# Patient Record
Sex: MALE | Race: WHITE | NOT HISPANIC OR LATINO | Employment: OTHER | URBAN - METROPOLITAN AREA
[De-identification: names, ages, dates, MRNs, and addresses within clinical notes are randomized per-mention and may not be internally consistent; named-entity substitution may affect disease eponyms.]

---

## 2017-01-08 DIAGNOSIS — J30.9 ATOPIC RHINITIS: ICD-10-CM

## 2017-01-09 RX ORDER — METFORMIN HYDROCHLORIDE 500 MG/1
TABLET, EXTENDED RELEASE ORAL
Qty: 120 TABLET | Refills: 10 | OUTPATIENT
Start: 2017-01-09

## 2017-01-09 RX ORDER — FLUTICASONE PROPIONATE 50 MCG
SPRAY, SUSPENSION (ML) NASAL
Refills: 0 | OUTPATIENT
Start: 2017-01-09

## 2017-01-11 DIAGNOSIS — J30.9 ATOPIC RHINITIS: ICD-10-CM

## 2017-01-11 RX ORDER — FLUTICASONE PROPIONATE 50 MCG
SPRAY, SUSPENSION (ML) NASAL
Refills: 0 | OUTPATIENT
Start: 2017-01-11

## 2017-01-20 ENCOUNTER — DOCUMENTATION (OUTPATIENT)
Dept: INTERNAL MEDICINE | Age: 72
End: 2017-01-20

## 2017-01-20 DIAGNOSIS — E11.9 CONTROLLED TYPE 2 DIABETES MELLITUS WITHOUT COMPLICATION, UNSPECIFIED LONG TERM INSULIN USE STATUS: ICD-10-CM

## 2017-01-20 DIAGNOSIS — I10 ESSENTIAL HYPERTENSION: ICD-10-CM

## 2017-01-20 RX ORDER — GLIPIZIDE 10 MG/1
10 TABLET, FILM COATED, EXTENDED RELEASE ORAL DAILY
Qty: 30 TABLET | Refills: 5 | Status: SHIPPED | OUTPATIENT
Start: 2017-01-20 | End: 2017-02-13 | Stop reason: SDUPTHER

## 2017-01-20 RX ORDER — METFORMIN HYDROCHLORIDE 500 MG/1
500 TABLET, EXTENDED RELEASE ORAL
Qty: 120 TABLET | Refills: 5 | Status: SHIPPED | OUTPATIENT
Start: 2017-01-20 | End: 2017-08-08 | Stop reason: SDUPTHER

## 2017-01-20 RX ORDER — LISINOPRIL 20 MG/1
20 TABLET ORAL DAILY
Qty: 30 TABLET | Refills: 5 | Status: SHIPPED | OUTPATIENT
Start: 2017-01-20 | End: 2017-02-13 | Stop reason: SDUPTHER

## 2017-01-22 DIAGNOSIS — E78.5 HYPERLIPIDEMIA, UNSPECIFIED HYPERLIPIDEMIA TYPE: ICD-10-CM

## 2017-01-23 RX ORDER — FENOFIBRATE 145 MG/1
TABLET, COATED ORAL
Qty: 15 TABLET | Refills: 0 | OUTPATIENT
Start: 2017-01-23

## 2017-01-25 ENCOUNTER — TELEPHONE (OUTPATIENT)
Dept: INTERNAL MEDICINE | Age: 72
End: 2017-01-25

## 2017-01-26 NOTE — TELEPHONE ENCOUNTER
I can't do that at this time but see what other times may work for them. However if they can wait see if there is early morning time spots available.

## 2017-02-13 ENCOUNTER — OFFICE VISIT (OUTPATIENT)
Dept: INTERNAL MEDICINE | Age: 72
End: 2017-02-13

## 2017-02-13 VITALS
TEMPERATURE: 97.4 F | OXYGEN SATURATION: 98 % | HEART RATE: 63 BPM | DIASTOLIC BLOOD PRESSURE: 80 MMHG | BODY MASS INDEX: 28.14 KG/M2 | WEIGHT: 190 LBS | SYSTOLIC BLOOD PRESSURE: 140 MMHG | HEIGHT: 69 IN

## 2017-02-13 DIAGNOSIS — E78.5 HYPERLIPIDEMIA, UNSPECIFIED HYPERLIPIDEMIA TYPE: ICD-10-CM

## 2017-02-13 DIAGNOSIS — R00.2 HEART PALPITATIONS: ICD-10-CM

## 2017-02-13 DIAGNOSIS — R00.2 INTERMITTENT PALPITATIONS: ICD-10-CM

## 2017-02-13 DIAGNOSIS — E11.9 TYPE 2 DIABETES MELLITUS WITHOUT COMPLICATION, UNSPECIFIED LONG TERM INSULIN USE STATUS: Chronic | ICD-10-CM

## 2017-02-13 DIAGNOSIS — I10 ESSENTIAL HYPERTENSION: ICD-10-CM

## 2017-02-13 DIAGNOSIS — E11.9 CONTROLLED TYPE 2 DIABETES MELLITUS WITHOUT COMPLICATION, UNSPECIFIED LONG TERM INSULIN USE STATUS: Primary | ICD-10-CM

## 2017-02-13 PROCEDURE — 99214 OFFICE O/P EST MOD 30 MIN: CPT | Performed by: INTERNAL MEDICINE

## 2017-02-13 PROCEDURE — 93000 ELECTROCARDIOGRAM COMPLETE: CPT | Performed by: INTERNAL MEDICINE

## 2017-02-13 RX ORDER — ASPIRIN 81 MG/1
TABLET ORAL
COMMUNITY
Start: 2014-08-05 | End: 2018-11-06

## 2017-02-13 RX ORDER — GLIPIZIDE 10 MG/1
10 TABLET, FILM COATED, EXTENDED RELEASE ORAL DAILY
Qty: 30 TABLET | Refills: 5 | Status: SHIPPED | OUTPATIENT
Start: 2017-02-13 | End: 2018-01-04 | Stop reason: SDUPTHER

## 2017-02-13 RX ORDER — FENOFIBRATE 145 MG/1
145 TABLET, COATED ORAL DAILY
Qty: 30 TABLET | Refills: 11 | Status: SHIPPED | OUTPATIENT
Start: 2017-02-13 | End: 2018-03-04 | Stop reason: SDUPTHER

## 2017-02-13 RX ORDER — PIOGLITAZONEHYDROCHLORIDE 15 MG/1
TABLET ORAL
COMMUNITY
Start: 2015-12-15 | End: 2017-02-13

## 2017-02-13 RX ORDER — LISINOPRIL 30 MG/1
30 TABLET ORAL DAILY
Qty: 30 TABLET | Refills: 11 | Status: SHIPPED | OUTPATIENT
Start: 2017-02-13 | End: 2017-03-04 | Stop reason: SDUPTHER

## 2017-02-13 RX ORDER — LISINOPRIL 20 MG/1
20 TABLET ORAL DAILY
Qty: 30 TABLET | Refills: 11 | Status: SHIPPED | OUTPATIENT
Start: 2017-02-13 | End: 2017-02-13 | Stop reason: SDUPTHER

## 2017-02-13 NOTE — ASSESSMENT & PLAN NOTE
Was not taking pioglitazone.  Continue glipizide ER 10 mg and metformin 500 mg 2 bid.  Referral ophthalmologist

## 2017-02-13 NOTE — PROGRESS NOTES
"Bang Georgian / 71 y.o. / male  02/13/2017    VITALS    Visit Vitals   • /80   • Pulse 63   • Temp 97.4 °F (36.3 °C)   • Ht 69\" (175.3 cm)   • Wt 190 lb (86.2 kg)   • SpO2 98%   • BMI 28.06 kg/m2     BP Readings from Last 3 Encounters:   02/13/17 140/80   12/11/15 132/70   08/12/15 132/82     Wt Readings from Last 3 Encounters:   02/13/17 190 lb (86.2 kg)   12/11/15 192 lb 15.9 oz (87.5 kg)   08/12/15 188 lb 0.1 oz (85.3 kg)      Body mass index is 28.06 kg/(m^2).    CC:  Main reason(s) for today's visit: Diabetes      HPI:     Chronic type 2 diabetes :   Home blood sugar levels: consistently marginally high. Compliant with medication(s).  Denies significant problems / side effects.    C/o intermittent palpitations (not exertional, not associated w/ cp, wen, lightheadedness).  Most recent A1c level(s):    Lab Results   Component Value Date    HGBA1C 7.4 (H) 12/11/2015     Chronic essential hypertension :  Home BP readings: stable with SBP <140. Compliant with medication(s).  Denies significant problems or side effects. Most recent in-office blood pressure readings:   BP Readings from Last 3 Encounters:   02/13/17 140/80   12/11/15 132/70   08/12/15 132/82     Chronic hyperlipidemia :  Compliant with therapy.  Denies significant problems with medication(s).  Most recent labs:   Lab Results   Component Value Date    LDL 97 12/11/2015    HDL 45 12/11/2015    TRIG 80 12/11/2015    CHOLHDLRATIO 3.5 12/11/2015         ____________________________________________________________________    ASSESSMENT & PLAN:    Problem List Items Addressed This Visit        High    Type 2 diabetes mellitus (Chronic)    Current Assessment & Plan     Was not taking pioglitazone.  Continue glipizide ER 10 mg and metformin 500 mg 2 bid.  Referral ophthalmologist          Relevant Medications    [employer] LANCETS THIN 26G misc    metFORMIN ER (GLUCOPHAGE-XR) 500 MG 24 hr tablet    glipiZIDE (GLIPIZIDE XL) 10 MG 24 hr tablet       Medium    " Hyperlipidemia (Chronic)    Current Assessment & Plan     Continue fenofibrate. Check labs.         Relevant Medications    fenofibrate (TRICOR) 145 MG tablet    Other Relevant Orders    Lipid Panel With / Chol / HDL Ratio    Hypertension (Chronic)    Current Assessment & Plan     Increase lisinopril to 30 mg qd.          Relevant Medications    lisinopril (PRINIVIL,ZESTRIL) 30 MG tablet      Other Visit Diagnoses     Controlled type 2 diabetes mellitus without complication, unspecified long term insulin use status    -  Primary    Relevant Medications    glipiZIDE (GLIPIZIDE XL) 10 MG 24 hr tablet    glucose blood (WAVESENSE PRESTO) test strip    lisinopril (PRINIVIL,ZESTRIL) 30 MG tablet    Other Relevant Orders    Ambulatory Referral to Ophthalmology    Hemoglobin A1c    Comprehensive Metabolic Panel    Microalbumin / Creatinine Urine Ratio    Intermittent palpitations        EKG sinus sajan (56 bpm) o/w normal.     Heart palpitations            Orders Placed This Encounter   Procedures   • Hemoglobin A1c   • Lipid Panel With / Chol / HDL Ratio   • Comprehensive Metabolic Panel   • Microalbumin / Creatinine Urine Ratio   • Ambulatory Referral to Ophthalmology       Summary/Discussion:     · Start ECASA 81 mg qd.       Return in about 6 months (around 8/13/2017) for F/U chronic medical problems, Diabetes.    Future Appointments  Date Time Provider Department Center   8/17/2017 7:40 AM Siva Godfrey MD [facility] None       ____________________________________________________________________    REVIEW OF SYSTEMS    Review of Systems  As noted per HPI  Constitutional neg   Resp neg   CV neg    Other: As noted per HPI      PHYSICAL EXAMINATION    Physical Exam    Meño had a diabetic foot exam performed today.   During the foot exam he had a monofilament test performed (neg).    Vascular Status -  His exam exhibits right foot vasculature abnormal. His exam exhibits left foot vasculature abnormal.   Skin Integrity  -   His right foot skin is not intact.   Meño's left foot skin is not intact. .    Constitutional  No distress   Cardiovascular Rate  normal . Rhythm: regular . Heart sounds:  normal    Pulmonary/Chest  Effort normal. Breath sounds:  normal   Psychiatric  Alert. Judgment and thought content normal. Mood normal      REVIEWED DATA:    Labs:   Lab Results   Component Value Date     12/11/2015    K 4.4 12/11/2015    AST 19 12/11/2015    ALT 12 12/11/2015    BUN 20 12/11/2015    CREATININE 0.96 12/11/2015    EGFRIFNONA 81 12/11/2015    EGFRIFAFRI 94 12/11/2015       Lab Results   Component Value Date     (H) 12/11/2015    HGBA1C 7.4 (H) 12/11/2015       Lab Results   Component Value Date    LDL 97 12/11/2015    HDL 45 12/11/2015    TRIG 80 12/11/2015    CHOLHDLRATIO 3.5 12/11/2015       No results found for: TSH, FREET4     No results found for: WBC, HGB, PLT     Imaging:        Medical Tests:   EKG: there are no previous tracings available for comparison, normal sinus rhythm, sinus bradycardia 56 bpm.      Summary of old records / correspondence / consultant report:        Request outside records:          ALLERGIES:    Review of patient's allergies indicates no known allergies.    MEDICATIONS:  Current Outpatient Prescriptions   Medication Sig Dispense Refill   • fenofibrate (TRICOR) 145 MG tablet Take 1 tablet by mouth Daily. 30 tablet 11   • fluticasone (FLONASE) 50 MCG/ACT nasal spray 1 spray into each nostril 2 (Two) Times a Day. *LAST REFILL UNTIL PATIENT MAKES APPOINTMENT* 1 each 0   • glipiZIDE (GLIPIZIDE XL) 10 MG 24 hr tablet Take 1 tablet by mouth Daily. 30 tablet 5   • glucose blood (WAVESENSE PRESTO) test strip Check BS once daily 100 each 0   • glucose blood test strip [employer] Blood Glucose Test In Vitro Strip; Patient Sig: [employer] Blood Glucose Test In Vitro Strip TEST BLOOD SUGAR ONCE DAILY; 1; 3; 25-Sep-2015; Active     • [employer] LANCETS misc Monitor bs daily as directed 100 each 0   • [employer]  LANCETS THIN 26G misc USE ONE LANCET TO TEST DAILY AS DIRECTED 100 each 0   • lisinopril (PRINIVIL,ZESTRIL) 20 MG tablet Take 1 tablet by mouth Daily. 30 tablet 11   • metFORMIN ER (GLUCOPHAGE-XR) 500 MG 24 hr tablet Take 1 tablet by mouth Daily With Breakfast. 2 tabs twice daily 120 tablet 5     No current facility-administered medications for this visit.        [facility]    The following portions of the patient's history were reviewed and updated as appropriate: Allergies / Current Medications / Past Medical History / Surgical History / Social History / Family History    PROBLEM LIST:    Patient Active Problem List   Diagnosis   • Type 2 diabetes mellitus   • Atopic rhinitis   • Hyperlipidemia   • Hypertension       PAST MEDICAL HX:    History reviewed. No pertinent past medical history.    PAST SURGICAL HX:    History reviewed. No pertinent past surgical history.    SOCIAL HX:    Social History     Social History   • Marital status:      Spouse name: N/A   • Number of children: N/A   • Years of education: N/A     Social History Main Topics   • Smoking status: Unknown If Ever Smoked   • Smokeless tobacco: None   • Alcohol use None   • Drug use: None   • Sexual activity: Not Asked     Other Topics Concern   • None     Social History Narrative   • None       FAMILY HX:    History reviewed. No pertinent family history.

## 2017-02-14 ENCOUNTER — TELEPHONE (OUTPATIENT)
Dept: INTERNAL MEDICINE | Age: 72
End: 2017-02-14

## 2017-02-14 DIAGNOSIS — E11.9 TYPE 2 DIABETES MELLITUS WITHOUT COMPLICATION, UNSPECIFIED LONG TERM INSULIN USE STATUS: Primary | Chronic | ICD-10-CM

## 2017-02-14 LAB
ALBUMIN SERPL-MCNC: 4.8 G/DL (ref 3.5–5.2)
ALBUMIN/CREAT UR: 103.2 MG/G CREAT (ref 0–30)
ALBUMIN/GLOB SERPL: 1.8 G/DL
ALP SERPL-CCNC: 52 U/L (ref 39–117)
ALT SERPL-CCNC: 14 U/L (ref 1–41)
AST SERPL-CCNC: 16 U/L (ref 1–40)
BILIRUB SERPL-MCNC: 0.5 MG/DL (ref 0.1–1.2)
BUN SERPL-MCNC: 17 MG/DL (ref 8–23)
BUN/CREAT SERPL: 19.5 (ref 7–25)
CALCIUM SERPL-MCNC: 9.7 MG/DL (ref 8.6–10.5)
CHLORIDE SERPL-SCNC: 100 MMOL/L (ref 98–107)
CHOLEST SERPL-MCNC: 154 MG/DL (ref 0–200)
CHOLEST/HDLC SERPL: 3.14 {RATIO}
CO2 SERPL-SCNC: 25.9 MMOL/L (ref 22–29)
CREAT SERPL-MCNC: 0.87 MG/DL (ref 0.76–1.27)
CREAT UR-MCNC: 44 MG/DL
GLOBULIN SER CALC-MCNC: 2.7 GM/DL
GLUCOSE SERPL-MCNC: 153 MG/DL (ref 65–99)
HBA1C MFR BLD: 7.2 % (ref 4.8–5.6)
HDLC SERPL-MCNC: 49 MG/DL (ref 40–60)
LDLC SERPL CALC-MCNC: 94 MG/DL (ref 0–100)
MICROALBUMIN UR-MCNC: 45.4 UG/ML
POTASSIUM SERPL-SCNC: 4.3 MMOL/L (ref 3.5–5.2)
PROT SERPL-MCNC: 7.5 G/DL (ref 6–8.5)
SODIUM SERPL-SCNC: 139 MMOL/L (ref 136–145)
TRIGL SERPL-MCNC: 57 MG/DL (ref 0–150)
VLDLC SERPL CALC-MCNC: 11.4 MG/DL (ref 5–40)

## 2017-02-14 RX ORDER — PIOGLITAZONEHYDROCHLORIDE 15 MG/1
15 TABLET ORAL EVERY MORNING
Qty: 30 TABLET | Refills: 5 | Status: SHIPPED | OUTPATIENT
Start: 2017-02-14 | End: 2017-08-10 | Stop reason: SDUPTHER

## 2017-02-14 NOTE — TELEPHONE ENCOUNTER
----- Message from Kenia Levy MA sent at 2/14/2017 11:17 AM EST -----      ----- Message -----     From: Siva Godfrey MD     Sent: 2/14/2017  11:14 AM       To: Kenia Levy MA    Call patient (may need to talk w/ his son) with his test result(s) and mail the results to him if MyChart is NOT active.    Diabetes is overall okay although not optimal.   Add pioglitazone 15 mg QAM.   Monitor BS carefully.     Cholesterol is stable.

## 2017-02-14 NOTE — TELEPHONE ENCOUNTER
Called pt's son for pt results he is informed to  Start new medication and that we will send these to his mailing address. MOISÉS

## 2017-02-14 NOTE — PROGRESS NOTES
Call patient (may need to talk w/ his son) with his test result(s) and mail the results to him if MyChart is NOT active.    Diabetes is overall okay although not optimal.   Add pioglitazone 15 mg QAM.   Monitor BS carefully.     Cholesterol is stable.

## 2017-03-04 ENCOUNTER — DOCUMENTATION (OUTPATIENT)
Dept: INTERNAL MEDICINE | Age: 72
End: 2017-03-04

## 2017-03-04 DIAGNOSIS — I10 ESSENTIAL HYPERTENSION: ICD-10-CM

## 2017-03-04 DIAGNOSIS — E11.9 CONTROLLED TYPE 2 DIABETES MELLITUS WITHOUT COMPLICATION, UNSPECIFIED LONG TERM INSULIN USE STATUS: ICD-10-CM

## 2017-03-04 RX ORDER — LISINOPRIL 30 MG/1
30 TABLET ORAL DAILY
Qty: 30 TABLET | Refills: 11 | Status: SHIPPED | OUTPATIENT
Start: 2017-03-04 | End: 2018-03-02 | Stop reason: SDUPTHER

## 2017-03-14 RX ORDER — BLOOD SUGAR DIAGNOSTIC
STRIP MISCELLANEOUS
Qty: 100 EACH | Refills: 1 | Status: SHIPPED | OUTPATIENT
Start: 2017-03-14 | End: 2017-11-08 | Stop reason: SDUPTHER

## 2017-05-13 DIAGNOSIS — J30.9 ATOPIC RHINITIS: ICD-10-CM

## 2017-05-15 RX ORDER — FLUTICASONE PROPIONATE 50 MCG
SPRAY, SUSPENSION (ML) NASAL
Qty: 16 G | Refills: 1 | Status: SHIPPED | OUTPATIENT
Start: 2017-05-15 | End: 2018-03-27 | Stop reason: SDUPTHER

## 2017-08-08 RX ORDER — METFORMIN HYDROCHLORIDE 500 MG/1
TABLET, EXTENDED RELEASE ORAL
Qty: 120 TABLET | Refills: 4 | Status: SHIPPED | OUTPATIENT
Start: 2017-08-08 | End: 2018-01-05 | Stop reason: SDUPTHER

## 2017-08-10 DIAGNOSIS — E11.9 CONTROLLED TYPE 2 DIABETES MELLITUS WITHOUT COMPLICATION, WITHOUT LONG-TERM CURRENT USE OF INSULIN (HCC): Primary | ICD-10-CM

## 2017-08-10 RX ORDER — PIOGLITAZONEHYDROCHLORIDE 15 MG/1
TABLET ORAL
Qty: 30 TABLET | Refills: 4 | Status: SHIPPED | OUTPATIENT
Start: 2017-08-10 | End: 2018-01-05 | Stop reason: SDUPTHER

## 2017-11-06 DIAGNOSIS — E11.9 CONTROLLED TYPE 2 DIABETES MELLITUS WITHOUT COMPLICATION, UNSPECIFIED LONG TERM INSULIN USE STATUS: ICD-10-CM

## 2017-11-06 RX ORDER — LANCETS
EACH MISCELLANEOUS
Qty: 100 EACH | Refills: 0 | Status: SHIPPED | OUTPATIENT
Start: 2017-11-06 | End: 2018-02-02 | Stop reason: SDUPTHER

## 2017-11-08 DIAGNOSIS — E11.9 TYPE 2 DIABETES MELLITUS WITHOUT COMPLICATION, UNSPECIFIED LONG TERM INSULIN USE STATUS: Primary | Chronic | ICD-10-CM

## 2017-11-08 RX ORDER — BLOOD SUGAR DIAGNOSTIC
STRIP MISCELLANEOUS
Qty: 100 EACH | Refills: 0 | Status: SHIPPED | OUTPATIENT
Start: 2017-11-08 | End: 2018-03-05 | Stop reason: SDUPTHER

## 2017-11-21 DIAGNOSIS — E11.9 CONTROLLED TYPE 2 DIABETES MELLITUS WITHOUT COMPLICATION, UNSPECIFIED LONG TERM INSULIN USE STATUS: ICD-10-CM

## 2017-11-21 RX ORDER — LANCETS
EACH MISCELLANEOUS
Qty: 100 EACH | Refills: 0 | Status: SHIPPED | OUTPATIENT
Start: 2017-11-21

## 2018-01-04 DIAGNOSIS — E11.9 CONTROLLED TYPE 2 DIABETES MELLITUS WITHOUT COMPLICATION, UNSPECIFIED LONG TERM INSULIN USE STATUS: ICD-10-CM

## 2018-01-04 RX ORDER — GLIPIZIDE 10 MG/1
10 TABLET, FILM COATED, EXTENDED RELEASE ORAL DAILY
Qty: 30 TABLET | Refills: 0 | Status: SHIPPED | OUTPATIENT
Start: 2018-01-04 | End: 2018-02-02 | Stop reason: SDUPTHER

## 2018-01-05 DIAGNOSIS — E11.9 CONTROLLED TYPE 2 DIABETES MELLITUS WITHOUT COMPLICATION, WITHOUT LONG-TERM CURRENT USE OF INSULIN (HCC): ICD-10-CM

## 2018-01-05 RX ORDER — PIOGLITAZONEHYDROCHLORIDE 15 MG/1
15 TABLET ORAL EVERY MORNING
Qty: 30 TABLET | Refills: 0 | Status: SHIPPED | OUTPATIENT
Start: 2018-01-05 | End: 2018-02-02 | Stop reason: SDUPTHER

## 2018-01-05 RX ORDER — METFORMIN HYDROCHLORIDE 500 MG/1
1000 TABLET, EXTENDED RELEASE ORAL 2 TIMES DAILY
Qty: 120 TABLET | Refills: 0 | Status: SHIPPED | OUTPATIENT
Start: 2018-01-05 | End: 2018-02-02 | Stop reason: SDUPTHER

## 2018-02-02 DIAGNOSIS — E11.9 CONTROLLED TYPE 2 DIABETES MELLITUS WITHOUT COMPLICATION, WITHOUT LONG-TERM CURRENT USE OF INSULIN (HCC): ICD-10-CM

## 2018-02-02 DIAGNOSIS — E11.9 CONTROLLED TYPE 2 DIABETES MELLITUS WITHOUT COMPLICATION, UNSPECIFIED LONG TERM INSULIN USE STATUS: ICD-10-CM

## 2018-02-02 RX ORDER — PIOGLITAZONEHYDROCHLORIDE 15 MG/1
15 TABLET ORAL EVERY MORNING
Qty: 15 TABLET | Refills: 0 | Status: SHIPPED | OUTPATIENT
Start: 2018-02-02 | End: 2018-03-02 | Stop reason: SDUPTHER

## 2018-02-02 RX ORDER — LANCETS
EACH MISCELLANEOUS
Qty: 100 EACH | Refills: 0 | Status: SHIPPED | OUTPATIENT
Start: 2018-02-02 | End: 2018-04-11 | Stop reason: SDUPTHER

## 2018-02-02 RX ORDER — METFORMIN HYDROCHLORIDE 500 MG/1
1000 TABLET, EXTENDED RELEASE ORAL 2 TIMES DAILY
Qty: 60 TABLET | Refills: 0 | Status: SHIPPED | OUTPATIENT
Start: 2018-02-02 | End: 2018-03-02 | Stop reason: SDUPTHER

## 2018-02-02 RX ORDER — GLIPIZIDE 10 MG/1
10 TABLET, FILM COATED, EXTENDED RELEASE ORAL DAILY
Qty: 15 TABLET | Refills: 0 | Status: SHIPPED | OUTPATIENT
Start: 2018-02-02 | End: 2018-03-02 | Stop reason: SDUPTHER

## 2018-02-17 DIAGNOSIS — E11.9 CONTROLLED TYPE 2 DIABETES MELLITUS WITHOUT COMPLICATION, UNSPECIFIED LONG TERM INSULIN USE STATUS: ICD-10-CM

## 2018-02-17 DIAGNOSIS — E11.9 CONTROLLED TYPE 2 DIABETES MELLITUS WITHOUT COMPLICATION, WITHOUT LONG-TERM CURRENT USE OF INSULIN (HCC): ICD-10-CM

## 2018-02-19 RX ORDER — METFORMIN HYDROCHLORIDE 500 MG/1
TABLET, EXTENDED RELEASE ORAL
Qty: 60 TABLET | Refills: 0 | OUTPATIENT
Start: 2018-02-19

## 2018-02-19 RX ORDER — PIOGLITAZONEHYDROCHLORIDE 15 MG/1
TABLET ORAL
Qty: 15 TABLET | Refills: 0 | OUTPATIENT
Start: 2018-02-19

## 2018-02-19 RX ORDER — GLIPIZIDE 10 MG/1
TABLET, FILM COATED, EXTENDED RELEASE ORAL
Qty: 15 TABLET | Refills: 0 | OUTPATIENT
Start: 2018-02-19

## 2018-03-02 DIAGNOSIS — I10 ESSENTIAL HYPERTENSION: ICD-10-CM

## 2018-03-02 DIAGNOSIS — E11.9 CONTROLLED TYPE 2 DIABETES MELLITUS WITHOUT COMPLICATION, UNSPECIFIED LONG TERM INSULIN USE STATUS: ICD-10-CM

## 2018-03-02 DIAGNOSIS — E11.9 CONTROLLED TYPE 2 DIABETES MELLITUS WITHOUT COMPLICATION, WITHOUT LONG-TERM CURRENT USE OF INSULIN (HCC): ICD-10-CM

## 2018-03-02 RX ORDER — METFORMIN HYDROCHLORIDE 500 MG/1
TABLET, EXTENDED RELEASE ORAL
Qty: 60 TABLET | Refills: 1 | Status: SHIPPED | OUTPATIENT
Start: 2018-03-02 | End: 2018-03-27 | Stop reason: SDUPTHER

## 2018-03-02 RX ORDER — GLIPIZIDE 10 MG/1
TABLET, FILM COATED, EXTENDED RELEASE ORAL
Qty: 30 TABLET | Refills: 1 | Status: SHIPPED | OUTPATIENT
Start: 2018-03-02 | End: 2018-03-27 | Stop reason: SDUPTHER

## 2018-03-02 RX ORDER — PIOGLITAZONEHYDROCHLORIDE 15 MG/1
TABLET ORAL
Qty: 30 TABLET | Refills: 1 | Status: SHIPPED | OUTPATIENT
Start: 2018-03-02 | End: 2018-03-27 | Stop reason: SDUPTHER

## 2018-03-02 RX ORDER — LISINOPRIL 30 MG/1
TABLET ORAL
Qty: 30 TABLET | Refills: 1 | Status: SHIPPED | OUTPATIENT
Start: 2018-03-02 | End: 2018-03-27 | Stop reason: SDUPTHER

## 2018-03-04 DIAGNOSIS — E78.5 HYPERLIPIDEMIA, UNSPECIFIED HYPERLIPIDEMIA TYPE: ICD-10-CM

## 2018-03-05 DIAGNOSIS — E11.9 TYPE 2 DIABETES MELLITUS WITHOUT COMPLICATION, UNSPECIFIED LONG TERM INSULIN USE STATUS: Chronic | ICD-10-CM

## 2018-03-05 RX ORDER — FENOFIBRATE 145 MG/1
TABLET, COATED ORAL
Qty: 30 TABLET | Refills: 10 | Status: SHIPPED | OUTPATIENT
Start: 2018-03-05 | End: 2018-03-27 | Stop reason: SDUPTHER

## 2018-03-05 RX ORDER — BLOOD SUGAR DIAGNOSTIC
STRIP MISCELLANEOUS
Qty: 100 EACH | Refills: 2 | Status: SHIPPED | OUTPATIENT
Start: 2018-03-05 | End: 2019-01-02 | Stop reason: SDUPTHER

## 2018-03-27 ENCOUNTER — OFFICE VISIT (OUTPATIENT)
Dept: INTERNAL MEDICINE | Age: 73
End: 2018-03-27

## 2018-03-27 VITALS
HEIGHT: 69 IN | BODY MASS INDEX: 30.51 KG/M2 | OXYGEN SATURATION: 97 % | DIASTOLIC BLOOD PRESSURE: 80 MMHG | SYSTOLIC BLOOD PRESSURE: 140 MMHG | TEMPERATURE: 97.8 F | HEART RATE: 66 BPM | WEIGHT: 206 LBS

## 2018-03-27 DIAGNOSIS — R07.89 CHEST TIGHTNESS OR PRESSURE: Primary | ICD-10-CM

## 2018-03-27 DIAGNOSIS — Z12.11 SCREENING FOR COLON CANCER: ICD-10-CM

## 2018-03-27 DIAGNOSIS — E11.9 TYPE 2 DIABETES MELLITUS WITHOUT COMPLICATION, UNSPECIFIED LONG TERM INSULIN USE STATUS: Chronic | ICD-10-CM

## 2018-03-27 DIAGNOSIS — E78.2 MIXED HYPERLIPIDEMIA: Chronic | ICD-10-CM

## 2018-03-27 DIAGNOSIS — J30.9 ALLERGIC RHINITIS, UNSPECIFIED CHRONICITY, UNSPECIFIED SEASONALITY, UNSPECIFIED TRIGGER: ICD-10-CM

## 2018-03-27 DIAGNOSIS — Z11.59 NEED FOR HEPATITIS C SCREENING TEST: ICD-10-CM

## 2018-03-27 DIAGNOSIS — I10 ESSENTIAL HYPERTENSION: Chronic | ICD-10-CM

## 2018-03-27 PROBLEM — I20.9 ANGINA PECTORIS (HCC): Status: ACTIVE | Noted: 2018-03-27

## 2018-03-27 PROCEDURE — 99215 OFFICE O/P EST HI 40 MIN: CPT | Performed by: INTERNAL MEDICINE

## 2018-03-27 RX ORDER — FLUTICASONE PROPIONATE 50 MCG
2 SPRAY, SUSPENSION (ML) NASAL DAILY
Qty: 3 BOTTLE | Refills: 3 | Status: SHIPPED | OUTPATIENT
Start: 2018-03-27 | End: 2019-03-29 | Stop reason: SDUPTHER

## 2018-03-27 RX ORDER — PIOGLITAZONEHYDROCHLORIDE 15 MG/1
15 TABLET ORAL EVERY MORNING
Qty: 90 TABLET | Refills: 3 | Status: SHIPPED | OUTPATIENT
Start: 2018-03-27 | End: 2018-06-04 | Stop reason: SDUPTHER

## 2018-03-27 RX ORDER — METFORMIN HYDROCHLORIDE 500 MG/1
1000 TABLET, EXTENDED RELEASE ORAL 2 TIMES DAILY
Qty: 360 TABLET | Refills: 3 | Status: SHIPPED | OUTPATIENT
Start: 2018-03-27 | End: 2018-09-28 | Stop reason: SDUPTHER

## 2018-03-27 RX ORDER — LISINOPRIL 30 MG/1
30 TABLET ORAL DAILY
Qty: 90 TABLET | Refills: 3 | Status: SHIPPED | OUTPATIENT
Start: 2018-03-27 | End: 2018-09-28 | Stop reason: SDUPTHER

## 2018-03-27 RX ORDER — FENOFIBRATE 145 MG/1
145 TABLET, COATED ORAL DAILY
Qty: 90 TABLET | Refills: 3 | Status: SHIPPED | OUTPATIENT
Start: 2018-03-27 | End: 2018-09-28 | Stop reason: SDUPTHER

## 2018-03-27 RX ORDER — GLIPIZIDE 10 MG/1
10 TABLET, FILM COATED, EXTENDED RELEASE ORAL DAILY
Qty: 90 TABLET | Refills: 3 | Status: SHIPPED | OUTPATIENT
Start: 2018-03-27 | End: 2018-06-04

## 2018-03-27 NOTE — ASSESSMENT & PLAN NOTE
This is a new problem to this examiner. No active chest pain. Take ASA 81 mg daily. Arrange for exercise nuclear stress test within 2 weeks.

## 2018-03-27 NOTE — PROGRESS NOTES
"[facility]  PROSPER JONES M.D.      Meño Georgian / 73 y.o. / male  03/27/2018      MEDICATIONS  Current Outpatient Prescriptions   Medication Sig Dispense Refill   • aspirin (ASPIRIN LOW DOSE) 81 MG EC tablet Take  by mouth.     • fenofibrate (TRICOR) 145 MG tablet Take 1 tablet by mouth Daily. 90 tablet 3   • glipiZIDE (GLUCOTROL XL) 10 MG 24 hr tablet Take 1 tablet by mouth Daily. 90 tablet 3   • glucose blood (WAVESENSE PRESTO) test strip Check BS once daily 100 each 0   • glucose blood test strip [employer] Blood Glucose Test In Vitro Strip; Patient Sig: [employer] Blood Glucose Test In Vitro Strip TEST BLOOD SUGAR ONCE DAILY; 1; 3; 25-Sep-2015; Active     • [employer] LANCETS THIN 26G misc USE ONE LANCET TO TEST DAILY AS DIRECTED 100 each 0   • [employer] LANCETS THIN 26G misc Use to test blood sugar one time daily. *PT MUST MAKE APPT FOR FUTURE REFILLS* 100 each 0   • [employer] LANCETS THIN 26G misc USE ONE LANCET TO TEST DAILY 100 each 0   • lisinopril (PRINIVIL,ZESTRIL) 30 MG tablet Take 1 tablet by mouth Daily. 90 tablet 3   • metFORMIN ER (GLUCOPHAGE-XR) 500 MG 24 hr tablet Take 2 tablets by mouth 2 (Two) Times a Day. 360 tablet 3   • pioglitazone (ACTOS) 15 MG tablet Take 1 tablet by mouth Every Morning. 90 tablet 3   • WAVESENSE PRESTO test strip USE ONE STRIP TO TEST DAILY 100 each 2   • fluticasone (FLONASE) 50 MCG/ACT nasal spray 2 sprays into each nostril Daily. 3 bottle 3     No current facility-administered medications for this visit.          VITALS    Visit Vitals  /80 (BP Location: Right arm)   Pulse 66   Temp 97.8 °F (36.6 °C)   Ht 175.3 cm (69.02\")   Wt 93.4 kg (206 lb)   SpO2 97%   BMI 30.41 kg/m²       BP Readings from Last 3 Encounters:   03/27/18 140/80   02/13/17 140/80   12/11/15 132/70     Wt Readings from Last 3 Encounters:   03/27/18 93.4 kg (206 lb)   02/13/17 86.2 kg (190 lb)   12/11/15 87.5 kg (192 lb 15.9 oz)      Body mass index is 30.41 kg/m².    CC:  Main reason(s) for today's visit: " Follow-up for diabetes and related medical problems    HPI:   Patient complains of recent onset of anterior chest pressure associated with exertion, most commonly when he is golfing and has to walk faster than usual. Resting tends to relieve. Denies diaphoresis or shortness of breath. No prior history of heart disease.     Chronic type 2 diabetes:  Home blood sugar levels: fluctuates with diet. Current therapy: metformin, glipizide and pioglitazone.  Most recent relevant labs:   Lab Results   Component Value Date    HGBA1C 7.20 (H) 02/13/2017    HGBA1C 7.4 (H) 12/11/2015    CREATININE 0.87 02/13/2017    LDL 94 02/13/2017    MICROALBUR 45.4 02/13/2017     Chronic essential hypertension:  Since prior visit: compliant with medication(s), does not check blood pressure at home and denies significant problems with medication(s).  Most recent in-office blood pressure readings:   BP Readings from Last 3 Encounters:   03/27/18 140/80   02/13/17 140/80   12/11/15 132/70     Chronic hyperlipidemia:  Current therapy include fenofibrate, denies problems with medication.    Most recent labs:   Lab Results   Component Value Date    LDL 94 02/13/2017    LDL 97 12/11/2015    HDL 49 02/13/2017    HDL 45 12/11/2015    TRIG 57 02/13/2017    CHOLHDLRATIO 3.14 02/13/2017         Patient Care Team:  Siva Godfrey MD as PCP - General  ____________________________________________________________________    ASSESSMENT & PLAN:    Problem List Items Addressed This Visit        High    Chest tightness or pressure - Primary    Current Assessment & Plan     This is a new problem to this examiner. No active chest pain. Take ASA 81 mg daily. Arrange for exercise nuclear stress test within 2 weeks.          Relevant Orders    Stress Test With Myocardial Perfusion One Day       Medium    Type 2 diabetes mellitus (Chronic)    Current Assessment & Plan     Check A1c.   Continue glipizie XL 10 mg, metformin  mg two BID, pioglitazone 15 mg qd.   He  was instructed to see his eye care provider for diabetic eye exam. He would like to make his appointment.          Relevant Medications    [employer] LANCETS THIN 26G misc    WAVESENSE PRESTO test strip    glipiZIDE (GLUCOTROL XL) 10 MG 24 hr tablet    metFORMIN ER (GLUCOPHAGE-XR) 500 MG 24 hr tablet    lisinopril (PRINIVIL,ZESTRIL) 30 MG tablet    pioglitazone (ACTOS) 15 MG tablet    Other Relevant Orders    Hemoglobin A1c    Comprehensive Metabolic Panel    Urinalysis With / Microscopic If Indicated - Urine, Clean Catch    Microalbumin / Creatinine Urine Ratio - Urine, Clean Catch    Hyperlipidemia (Chronic)    Current Assessment & Plan     Continue fenofibrate. Check labs today.          Relevant Medications    fenofibrate (TRICOR) 145 MG tablet    Other Relevant Orders    Lipid Panel With / Chol / HDL Ratio    TSH+Free T4    Hypertension (Chronic)    Overview     Stable. Continue lisinopril 30 mg qd.          Relevant Medications    lisinopril (PRINIVIL,ZESTRIL) 30 MG tablet       Unprioritized    Atopic rhinitis    Relevant Medications    fluticasone (FLONASE) 50 MCG/ACT nasal spray      Other Visit Diagnoses     Need for hepatitis C screening test        Relevant Orders    Hepatitis C Antibody    Screening for colon cancer        Relevant Orders    Ambulatory Referral For Screening Colonoscopy        Orders Placed This Encounter   Procedures   • Hemoglobin A1c   • Lipid Panel With / Chol / HDL Ratio   • Comprehensive Metabolic Panel   • TSH+Free T4   • Urinalysis With / Microscopic If Indicated - Urine, Clean Catch   • Microalbumin / Creatinine Urine Ratio - Urine, Clean Catch   • Hepatitis C Antibody   • Ambulatory Referral For Screening Colonoscopy   • Stress Test With Myocardial Perfusion One Day       Summary/Discussion:      Return in about 6 months (around 9/27/2018) for Diabetes and co-morbid conditions.    Future Appointments  Date Time Provider Department Center   10/2/2018 7:40 AM Siva Godfrey MD [facility] None     ____________________________________________________________________    REVIEW OF SYSTEMS    Review of Systems   Eyes: Negative.    All other systems reviewed and are negative.    As noted per HPI  Constitutional neg  Resp neg  CV neg x chest pressure with exertion        PHYSICAL EXAMINATION    Physical Exam    Meño had a diabetic foot exam performed today.   During the foot exam he had a monofilament test performed (normal).  Vascular Status -  His right foot exhibits abnormal foot vasculature . His left foot exhibits abnormal foot vasculature .  Skin Integrity  -  His right foot skin is not intact.  His left foot skin is not intact..    Constitutional  No distress, overweight  Cardiovascular Rate  normal . Rhythm: regular . Heart sounds:  normal  Pulmonary/Chest  Effort normal. Breath sounds:  normal  Psychiatric  Alert. Judgment and thought content normal. Mood normal    REVIEWED DATA:    Labs:   Lab Results   Component Value Date     02/13/2017    K 4.3 02/13/2017    AST 16 02/13/2017    ALT 14 02/13/2017    BUN 17 02/13/2017    CREATININE 0.87 02/13/2017    CREATININE 0.96 12/11/2015    EGFRIFNONA 87 02/13/2017    EGFRIFAFRI 105 02/13/2017       Lab Results   Component Value Date    HGBA1C 7.20 (H) 02/13/2017    HGBA1C 7.4 (H) 12/11/2015    MICROALBUR 45.4 02/13/2017       Lab Results   Component Value Date    LDL 94 02/13/2017    LDL 97 12/11/2015    HDL 49 02/13/2017    HDL 45 12/11/2015    TRIG 57 02/13/2017    CHOLHDLRATIO 3.14 02/13/2017       No results found for: TSH, FREET4     No results found for: WBC, HGB, PLT     Imaging:        Medical Tests:        Summary of old records / correspondence / consultant report:        Request outside records:        ALLERGIES  No Known Allergies     PFSH:     The following portions of the patient's history were reviewed and updated as appropriate: Allergies / Current Medications / Past Medical History / Surgical History / Social History / Family  History    PROBLEM LIST   Patient Active Problem List   Diagnosis   • Type 2 diabetes mellitus   • Atopic rhinitis   • Hyperlipidemia   • Hypertension   • Chest tightness or pressure       PAST MEDICAL HISTORY  History reviewed. No pertinent past medical history.    SURGICAL HISTORY  History reviewed. No pertinent surgical history.    SOCIAL HISTORY  Social History     Social History   • Marital status:      Social History Main Topics   • Smoking status: Unknown If Ever Smoked   • Smokeless tobacco: Never Used   • Drug use: Unknown     Other Topics Concern   • Not on file       FAMILY HISTORY  History reviewed. No pertinent family history.      **Dragon Disclaimer:   Much of this encounter note is an electronic transcription/translation of spoken language to printed text. The electronic translation of spoken language may permit erroneous, or at times, nonsensical words or phrases to be inadvertently transcribed. Although I have reviewed the note for such errors, some may still exist.

## 2018-03-27 NOTE — ASSESSMENT & PLAN NOTE
Check A1c.   Continue glipizie XL 10 mg, metformin  mg two BID, pioglitazone 15 mg qd.   He was instructed to see his eye care provider for diabetic eye exam. He would like to make his appointment.

## 2018-03-28 LAB
ALBUMIN SERPL-MCNC: 5 G/DL (ref 3.5–5.2)
ALBUMIN/CREAT UR: 58.3 MG/G CREAT (ref 0–30)
ALBUMIN/GLOB SERPL: 2 G/DL
ALP SERPL-CCNC: 45 U/L (ref 39–117)
ALT SERPL-CCNC: 15 U/L (ref 1–41)
APPEARANCE UR: CLEAR
AST SERPL-CCNC: 23 U/L (ref 1–40)
BILIRUB SERPL-MCNC: 0.4 MG/DL (ref 0.1–1.2)
BILIRUB UR QL STRIP: NEGATIVE
BUN SERPL-MCNC: 23 MG/DL (ref 8–23)
BUN/CREAT SERPL: 22.8 (ref 7–25)
CALCIUM SERPL-MCNC: 9.8 MG/DL (ref 8.6–10.5)
CHLORIDE SERPL-SCNC: 103 MMOL/L (ref 98–107)
CHOLEST SERPL-MCNC: 152 MG/DL (ref 0–200)
CHOLEST/HDLC SERPL: 2.87 {RATIO}
CO2 SERPL-SCNC: 25 MMOL/L (ref 22–29)
COLOR UR: YELLOW
CREAT SERPL-MCNC: 1.01 MG/DL (ref 0.76–1.27)
CREAT UR-MCNC: 83.5 MG/DL
GFR SERPLBLD CREATININE-BSD FMLA CKD-EPI: 73 ML/MIN/1.73
GFR SERPLBLD CREATININE-BSD FMLA CKD-EPI: 88 ML/MIN/1.73
GLOBULIN SER CALC-MCNC: 2.5 GM/DL
GLUCOSE SERPL-MCNC: 72 MG/DL (ref 65–99)
GLUCOSE UR QL: NEGATIVE
HBA1C MFR BLD: 7.06 % (ref 4.8–5.6)
HCV AB S/CO SERPL IA: <0.1 S/CO RATIO (ref 0–0.9)
HDLC SERPL-MCNC: 53 MG/DL (ref 40–60)
HGB UR QL STRIP: NEGATIVE
KETONES UR QL STRIP: NEGATIVE
LDLC SERPL CALC-MCNC: 81 MG/DL (ref 0–100)
LEUKOCYTE ESTERASE UR QL STRIP: NEGATIVE
MICROALBUMIN UR-MCNC: 48.7 UG/ML
NITRITE UR QL STRIP: NEGATIVE
PH UR STRIP: 6.5 [PH] (ref 5–8)
POTASSIUM SERPL-SCNC: 4.3 MMOL/L (ref 3.5–5.2)
PROT SERPL-MCNC: 7.5 G/DL (ref 6–8.5)
PROT UR QL STRIP: NEGATIVE
SODIUM SERPL-SCNC: 142 MMOL/L (ref 136–145)
SP GR UR: 1.02 (ref 1–1.03)
T4 FREE SERPL-MCNC: 1.58 NG/DL (ref 0.93–1.7)
TRIGL SERPL-MCNC: 89 MG/DL (ref 0–150)
TSH SERPL DL<=0.005 MIU/L-ACNC: 1.14 MIU/ML (ref 0.27–4.2)
UROBILINOGEN UR STRIP-MCNC: NORMAL MG/DL
VLDLC SERPL CALC-MCNC: 17.8 MG/DL (ref 5–40)

## 2018-03-28 NOTE — PROGRESS NOTES
Send result letter to patient.    Bang, these are your recent lab results:    1. Diabetes control is not optimal but is overall okay for now. Watch your diet better as we talked about. (Less rice, noodles). Try to lose weight with better diet and physical activity.  2. Cholesterol is fine.   3. Other labs were fine.     Please call my office if you have any questions. Otherwise, we can discuss the results in further detail on your next visit.

## 2018-03-29 ENCOUNTER — TELEPHONE (OUTPATIENT)
Dept: INTERNAL MEDICINE | Age: 73
End: 2018-03-29

## 2018-03-29 NOTE — TELEPHONE ENCOUNTER
----- Message from Siva Godfrey MD sent at 3/28/2018  4:31 PM EDT -----  Send result letter to patient.     Bang, these are your recent lab results:    1. Diabetes control is not optimal but is overall okay for now. Watch your diet better as we talked about. (Less rice, noodles). Try to lose weight with better diet and physical activity.  2. Cholesterol is fine.   3. Other labs were fine.     Please call my office if you have any questions. Otherwise, we can discuss the results in further detail on your next visit.

## 2018-04-05 ENCOUNTER — HOSPITAL ENCOUNTER (OUTPATIENT)
Dept: NUCLEAR MEDICINE | Facility: HOSPITAL | Age: 73
Discharge: HOME | End: 2018-04-05

## 2018-04-05 ENCOUNTER — TELEPHONE (OUTPATIENT)
Dept: INTERNAL MEDICINE | Age: 73
End: 2018-04-05

## 2018-04-05 DIAGNOSIS — R07.89 CHEST TIGHTNESS OR PRESSURE: ICD-10-CM

## 2018-04-05 LAB
BH CV STRESS COMMENTS STAGE 1: NORMAL
BH CV STRESS DOSE REGADENOSON STAGE 1: 0.4
BH CV STRESS DURATION MIN STAGE 1: 0
BH CV STRESS DURATION SEC STAGE 1: 10
BH CV STRESS PROTOCOL 1: NORMAL
BH CV STRESS RECOVERY BP: NORMAL MMHG
BH CV STRESS RECOVERY HR: 67 BPM
BH CV STRESS STAGE 1: 1
LV EF NUC BP: 64 %
MAXIMAL PREDICTED HEART RATE: 149 BPM
PERCENT MAX PREDICTED HR: 52.35 %
STRESS BASELINE BP: NORMAL MMHG
STRESS BASELINE HR: 60 BPM
STRESS PERCENT HR: 62 %
STRESS POST PEAK BP: NORMAL MMHG
STRESS POST PEAK HR: 78 BPM
STRESS TARGET HR: 127 BPM

## 2018-04-05 PROCEDURE — 78452 HT MUSCLE IMAGE SPECT MULT: CPT

## 2018-04-05 PROCEDURE — 93017 CV STRESS TEST TRACING ONLY: CPT

## 2018-04-05 PROCEDURE — A9500 TC99M SESTAMIBI: HCPCS | Performed by: INTERNAL MEDICINE

## 2018-04-05 PROCEDURE — 93016 CV STRESS TEST SUPVJ ONLY: CPT | Performed by: INTERNAL MEDICINE

## 2018-04-05 PROCEDURE — 78452 HT MUSCLE IMAGE SPECT MULT: CPT | Performed by: INTERNAL MEDICINE

## 2018-04-05 PROCEDURE — 25010000002 REGADENOSON 0.4 MG/5ML SOLUTION: Performed by: INTERNAL MEDICINE

## 2018-04-05 PROCEDURE — 93018 CV STRESS TEST I&R ONLY: CPT | Performed by: INTERNAL MEDICINE

## 2018-04-05 PROCEDURE — 0 TECHNETIUM SESTAMIBI: Performed by: INTERNAL MEDICINE

## 2018-04-05 RX ADMIN — TECHNETIUM TC 99M SESTAMIBI 1 DOSE: 1 INJECTION INTRAVENOUS at 07:35

## 2018-04-05 RX ADMIN — TECHNETIUM TC 99M SESTAMIBI 1 DOSE: 1 INJECTION INTRAVENOUS at 09:30

## 2018-04-05 RX ADMIN — REGADENOSON 0.4 MG: 0.08 INJECTION, SOLUTION INTRAVENOUS at 09:30

## 2018-04-05 NOTE — TELEPHONE ENCOUNTER
Please call patient and let him know his cardiac stress test was negative, no ischemia (decreased blood flow to the heart) was noted.

## 2018-04-05 NOTE — TELEPHONE ENCOUNTER
IVETH with son with pt results and to call back if there is any questions concerning those results. MOISÉS

## 2018-04-06 NOTE — TELEPHONE ENCOUNTER
Patient's son wanted the test results from his recent cardiac stress test. Please call the son back.

## 2018-04-11 DIAGNOSIS — E11.9 CONTROLLED TYPE 2 DIABETES MELLITUS WITHOUT COMPLICATION, UNSPECIFIED LONG TERM INSULIN USE STATUS: ICD-10-CM

## 2018-04-11 RX ORDER — LANCETS
EACH MISCELLANEOUS
Qty: 100 EACH | Refills: 1 | Status: SHIPPED | OUTPATIENT
Start: 2018-04-11 | End: 2019-06-13 | Stop reason: SDUPTHER

## 2018-06-04 ENCOUNTER — OFFICE VISIT (OUTPATIENT)
Dept: INTERNAL MEDICINE | Age: 73
End: 2018-06-04

## 2018-06-04 ENCOUNTER — HOSPITAL ENCOUNTER (OUTPATIENT)
Dept: GENERAL RADIOLOGY | Facility: HOSPITAL | Age: 73
Discharge: HOME | End: 2018-06-04
Admitting: INTERNAL MEDICINE

## 2018-06-04 VITALS
WEIGHT: 200 LBS | OXYGEN SATURATION: 98 % | BODY MASS INDEX: 29.62 KG/M2 | TEMPERATURE: 97.9 F | DIASTOLIC BLOOD PRESSURE: 84 MMHG | SYSTOLIC BLOOD PRESSURE: 136 MMHG | HEART RATE: 82 BPM | HEIGHT: 69 IN

## 2018-06-04 DIAGNOSIS — M54.40 LUMBAGO OF LUMBAR REGION WITH SCIATICA: Primary | ICD-10-CM

## 2018-06-04 DIAGNOSIS — E11.9 TYPE 2 DIABETES MELLITUS WITHOUT COMPLICATION, UNSPECIFIED LONG TERM INSULIN USE STATUS: Chronic | ICD-10-CM

## 2018-06-04 PROCEDURE — 99214 OFFICE O/P EST MOD 30 MIN: CPT | Performed by: INTERNAL MEDICINE

## 2018-06-04 PROCEDURE — 72110 X-RAY EXAM L-2 SPINE 4/>VWS: CPT

## 2018-06-04 RX ORDER — PIOGLITAZONEHYDROCHLORIDE 30 MG/1
30 TABLET ORAL EVERY MORNING
Qty: 90 TABLET | Refills: 1 | Status: SHIPPED | OUTPATIENT
Start: 2018-06-04 | End: 2018-09-28 | Stop reason: SDUPTHER

## 2018-06-04 RX ORDER — GLIMEPIRIDE 2 MG/1
2 TABLET ORAL
Qty: 90 TABLET | Refills: 1 | Status: SHIPPED | OUTPATIENT
Start: 2018-06-04 | End: 2018-08-08 | Stop reason: SDUPTHER

## 2018-06-04 RX ORDER — MELOXICAM 15 MG/1
15 TABLET ORAL DAILY
Qty: 30 TABLET | Refills: 0 | Status: SHIPPED | OUTPATIENT
Start: 2018-06-04 | End: 2018-11-06

## 2018-06-04 NOTE — PROGRESS NOTES
[facility]  PROSPER JONES M.D.      Meño [place] / 73 y.o. / male  06/04/2018      ASSESSMENT & PLAN:    Problem List Items Addressed This Visit        High    Lumbago of lumbar region with sciatica - Primary    Current Assessment & Plan     Left side pain. Check XRAY lumbar spine, PT, meloxicam 15 mg qd 7-10 days. Call if not improving. Consider MRI.          Relevant Medications    meloxicam (MOBIC) 15 MG tablet    Other Relevant Orders    XR Spine Lumbar 4+ View    Ambulatory Referral to Physical Therapy Evaluate and treat       Medium    Type 2 diabetes mellitus (Chronic)    Current Assessment & Plan     Previously over phone increased pioglitazone to 15 mg two daily. Sugar levels are still high. Change glipizide XL 10 mg to glimepiride 2 mg qAM, continue metformin 500 mg 2 BID, and pioglitazone 30 mg daily (sent new Rx). Watch diet better.          Relevant Medications    WAVESENSE PRESTO test strip    metFORMIN ER (GLUCOPHAGE-XR) 500 MG 24 hr tablet    lisinopril (PRINIVIL,ZESTRIL) 30 MG tablet    pioglitazone (ACTOS) 30 MG tablet    glimepiride (AMARYL) 2 MG tablet           Summary/Discussion:      Return in about 2 months (around 8/4/2018) for Diabetes and co-morbid conditions.    ____________________________________________________________________    MEDICATIONS  Current Outpatient Prescriptions   Medication Sig Dispense Refill   • aspirin (ASPIRIN LOW DOSE) 81 MG EC tablet Take  by mouth.     • fenofibrate (TRICOR) 145 MG tablet Take 1 tablet by mouth Daily. 90 tablet 3   • fluticasone (FLONASE) 50 MCG/ACT nasal spray 2 sprays into each nostril Daily. 3 bottle 3   • glucose blood (WAVESENSE PRESTO) test strip Check BS once daily 100 each 0   • glucose blood test strip [employer] Blood Glucose Test In Vitro Strip; Patient Sig: [employer] Blood Glucose Test In Vitro Strip TEST BLOOD SUGAR ONCE DAILY; 1; 3; 25-Sep-2015; Active     • [employer] LANCETS THIN 26G misc Use to test blood sugar one time daily. *PT MUST  "MAKE APPT FOR FUTURE REFILLS* 100 each 0   • [employer] LANCETS THIN 26G misc USE ONE LANCET TO TEST DAILY 100 each 1   • lisinopril (PRINIVIL,ZESTRIL) 30 MG tablet Take 1 tablet by mouth Daily. 90 tablet 3   • metFORMIN ER (GLUCOPHAGE-XR) 500 MG 24 hr tablet Take 2 tablets by mouth 2 (Two) Times a Day. 360 tablet 3   • pioglitazone (ACTOS) 15 MG tablet Take 2 tablet by mouth Every Morning. 90 tablet 1   • WAVESENSE PRESTO test strip USE ONE STRIP TO TEST DAILY 100 each 2   • Glipizide XL 10 mg tablet Take 1 tablet by mouth Every Morning Before Breakfast. 90 tablet 1         VITALS    Visit Vitals  /84   Pulse 82   Temp 97.9 °F (36.6 °C)   Ht 175.3 cm (69.02\")   Wt 90.7 kg (200 lb)   SpO2 98%   BMI 29.52 kg/m²       BP Readings from Last 3 Encounters:   06/04/18 136/84   03/27/18 140/80   02/13/17 140/80     Wt Readings from Last 3 Encounters:   06/04/18 90.7 kg (200 lb)   03/27/18 93.4 kg (206 lb)   02/13/17 86.2 kg (190 lb)      Body mass index is 29.52 kg/m².      CC:  Main reason(s) for today's visit: Back Pain (2 weeks) and elevated glucose levels (250 to 300 after eating)      HPI:     2 months ago injured back while lifting something heavy at work. Felt acute left low back pain. Flew to [place] and came back but felt recurrent left side low back/buttock area pain. Last 2 weeks pain became more persistent with left side sciatic pain, associated with paresthesia, numbness without weakness, bowel/bladder problem. Went to someone for some acupuncture and massage therapy with mild relief. Has not taken any medication for this.     Chronic type 2 diabetes:  Home blood sugar levels: higher than before, always too high since returning from [place]. Current therapy: metformin, glipizide and pioglitazone.  Most recent relevant labs:   Lab Results   Component Value Date    HGBA1C 7.06 (H) 03/27/2018    HGBA1C 7.20 (H) 02/13/2017    HGBA1C 7.4 (H) 12/11/2015    CREATININE 1.01 03/27/2018    LDL 81 03/27/2018    MICROALBUR " 48.7 03/27/2018        Patient Care Team:  Siva Godfrey MD as PCP - General  ____________________________________________________________________      REVIEW OF SYSTEMS    Review of Systems   Constitutional: Negative.  Negative for fever.   Respiratory: Negative.    Cardiovascular: Negative.    Gastrointestinal: Negative.    Genitourinary: Negative.    Musculoskeletal: Positive for back pain.   Neurological:        Sciatic pain of LLE to mid-calf region         PHYSICAL EXAMINATION    Physical Exam   Constitutional:   Walks with can, flexed at waist   Musculoskeletal:        Thoracic back: He exhibits no tenderness and no bony tenderness.        Lumbar back: He exhibits tenderness (left lower back tenderness) and spasm (left lower back).   Neurological:   Reflex Scores:       Patellar reflexes are 2+ on the right side and 2+ on the left side.       Achilles reflexes are 1+ on the right side and 1+ on the left side.        REVIEWED DATA:    Labs:       Imaging:        Medical Tests:        Summary of old records / correspondence / consultant report:        Request outside records:       ALLERGIES  No Known Allergies     PFSH:     The following portions of the patient's history were reviewed and updated as appropriate: Allergies / Current Medications / Past Medical History / Surgical History / Social History / Family History    PROBLEM LIST   Patient Active Problem List   Diagnosis   • Type 2 diabetes mellitus   • Atopic rhinitis   • Hyperlipidemia   • Hypertension   • Chest tightness or pressure   • Lumbago of lumbar region with sciatica       PAST MEDICAL HISTORY  Past Medical History:   Diagnosis Date   • Diabetes mellitus    • Hyperlipidemia    • Hypertension        SURGICAL HISTORY  History reviewed. No pertinent surgical history.    SOCIAL HISTORY  Social History     Social History   • Marital status:      Occupational History   • [employer] restaurant owner      Social History Main Topics   • Smoking status:  Unknown If Ever Smoked   • Smokeless tobacco: Never Used   • Drug use: Unknown     Other Topics Concern   • Not on file       FAMILY HISTORY  History reviewed. No pertinent family history.      **Roxy Disclaimer:   Much of this encounter note is an electronic transcription/translation of spoken language to printed text. The electronic translation of spoken language may permit erroneous, or at times, nonsensical words or phrases to be inadvertently transcribed. Although I have reviewed the note for such errors, some may still exist.

## 2018-06-04 NOTE — ASSESSMENT & PLAN NOTE
Left side pain. Check XRAY lumbar spine, PT, meloxicam 15 mg qd 7-10 days. Call if not improving. Consider MRI.

## 2018-06-04 NOTE — ASSESSMENT & PLAN NOTE
Previously over phone increased pioglitazone to 15 mg two daily. Sugar levels are still high. Change glipizide XL 10 mg to glimepiride 2 mg qAM, continue metformin 500 mg 2 BID, and pioglitazone 30 mg daily (sent new Rx). Watch diet better.

## 2018-06-06 ENCOUNTER — TELEPHONE (OUTPATIENT)
Dept: INTERNAL MEDICINE | Age: 73
End: 2018-06-06

## 2018-06-06 NOTE — TELEPHONE ENCOUNTER
----- Message from Siva Godfrey MD sent at 6/5/2018  2:53 PM EDT -----  Send result letter to patient.     Bang, these are your recent lab results:    Xray of the spine shows degenerative changes. No fractures or seen.   Continue plans as we discussed. If not improving, we will consider ordering an MRI.     Please call my office if you have any questions. Otherwise, we can discuss the results in further detail on your next visit.

## 2018-08-08 ENCOUNTER — OFFICE VISIT (OUTPATIENT)
Dept: INTERNAL MEDICINE | Age: 73
End: 2018-08-08

## 2018-08-08 VITALS
TEMPERATURE: 98.1 F | DIASTOLIC BLOOD PRESSURE: 72 MMHG | OXYGEN SATURATION: 97 % | HEART RATE: 63 BPM | SYSTOLIC BLOOD PRESSURE: 140 MMHG | WEIGHT: 206 LBS | BODY MASS INDEX: 30.51 KG/M2 | HEIGHT: 69 IN

## 2018-08-08 DIAGNOSIS — E78.2 MIXED HYPERLIPIDEMIA: Primary | Chronic | ICD-10-CM

## 2018-08-08 DIAGNOSIS — I10 ESSENTIAL HYPERTENSION: Chronic | ICD-10-CM

## 2018-08-08 DIAGNOSIS — E11.9 TYPE 2 DIABETES MELLITUS WITHOUT COMPLICATION, UNSPECIFIED LONG TERM INSULIN USE STATUS: Chronic | ICD-10-CM

## 2018-08-08 PROCEDURE — 99214 OFFICE O/P EST MOD 30 MIN: CPT | Performed by: INTERNAL MEDICINE

## 2018-08-08 RX ORDER — GLIMEPIRIDE 2 MG/1
TABLET ORAL
Qty: 135 TABLET | Refills: 1
Start: 2018-08-08 | End: 2018-09-28 | Stop reason: SDUPTHER

## 2018-08-08 NOTE — PROGRESS NOTES
[facility] [place] [facility] MEDICINE  PROSPER JONES M.D.      Meño English / 73 y.o. / male  08/08/2018      ASSESSMENT & PLAN:    Problem List Items Addressed This Visit        High    Type 2 diabetes mellitus (CMS/HCC) (Chronic)    Current Assessment & Plan     Uncontrolled.  Increase glimepiride to 2 mg qAM and take 1/2 of 2 mg before supper. Continue other medications. Referral placed for diabetic eye exam.          Relevant Medications    WAVESENSE PRESTO test strip    metFORMIN ER (GLUCOPHAGE-XR) 500 MG 24 hr tablet    lisinopril (PRINIVIL,ZESTRIL) 30 MG tablet    pioglitazone (ACTOS) 30 MG tablet    glimepiride (AMARYL) 2 MG tablet    Other Relevant Orders    Ambulatory Referral to Ophthalmology       Medium    Hyperlipidemia - Primary (Chronic)    Overview     Continue fenofibrate 145 mg          Relevant Medications    fenofibrate (TRICOR) 145 MG tablet    Hypertension (Chronic)    Overview     Stable. Continue lisinopril 30 mg qd.          Relevant Medications    lisinopril (PRINIVIL,ZESTRIL) 30 MG tablet        Orders Placed This Encounter   Procedures   • Ambulatory Referral to Ophthalmology     New Medications Ordered This Visit   Medications   • glimepiride (AMARYL) 2 MG tablet     Sig: Take 1 tablet in the morning with breakfast, take 1/2 tab with supper     Dispense:  135 tablet     Refill:  1       Summary/Discussion:      Return in about 3 months (around 11/8/2018) for Diabetes and co-morbid conditions.    ____________________________________________________________________    MEDICATIONS  Current Outpatient Prescriptions   Medication Sig Dispense Refill   • aspirin (ASPIRIN LOW DOSE) 81 MG EC tablet Take  by mouth.     • fenofibrate (TRICOR) 145 MG tablet Take 1 tablet by mouth Daily. 90 tablet 3   • fluticasone (FLONASE) 50 MCG/ACT nasal spray 2 sprays into each nostril Daily. 3 bottle 3   • glimepiride (AMARYL) 2 MG tablet Take 1 tablet by mouth Every Morning Before Breakfast. 90 tablet 1   • glucose blood (WAVESENSE  "PRESTO) test strip Check BS once daily 100 each 0   • glucose blood test strip [employer] Blood Glucose Test In Vitro Strip; Patient Sig: [employer] Blood Glucose Test In Vitro Strip TEST BLOOD SUGAR ONCE DAILY; 1; 3; 25-Sep-2015; Active     • [employer] LANCETS THIN 26G misc Use to test blood sugar one time daily. *PT MUST MAKE APPT FOR FUTURE REFILLS* 100 each 0   • [employer] LANCETS THIN 26G misc USE ONE LANCET TO TEST DAILY 100 each 1   • lisinopril (PRINIVIL,ZESTRIL) 30 MG tablet Take 1 tablet by mouth Daily. 90 tablet 3   • meloxicam (MOBIC) 15 MG tablet Take 1 tablet by mouth Daily. 30 tablet 0   • metFORMIN ER (GLUCOPHAGE-XR) 500 MG 24 hr tablet Take 2 tablets by mouth 2 (Two) Times a Day. 360 tablet 3   • pioglitazone (ACTOS) 30 MG tablet Take 1 tablet by mouth Every Morning. 90 tablet 1   • WAVESENSE PRESTO test strip USE ONE STRIP TO TEST DAILY 100 each 2     No current facility-administered medications for this visit.          VITALS:    Visit Vitals  /72 (BP Location: Left arm, Patient Position: Sitting, Cuff Size: Adult)   Pulse 63   Temp 98.1 °F (36.7 °C) (Temporal Artery )   Ht 175.3 cm (69.02\")   Wt 93.4 kg (206 lb)   SpO2 97%   BMI 30.40 kg/m²       BP Readings from Last 3 Encounters:   08/08/18 140/72   06/04/18 136/84   03/27/18 140/80     Wt Readings from Last 3 Encounters:   08/08/18 93.4 kg (206 lb)   06/04/18 90.7 kg (200 lb)   03/27/18 93.4 kg (206 lb)      Body mass index is 30.4 kg/m².    CC:  Main reason(s) for today's visit: Hypertension; Hyperlipidemia; and Diabetes      HPI:     Back pain is doing better. Playing golf again.     Chronic type 2 diabetes:  Home blood sugar levels: always too high, has no significant low sugar symptoms/problems. Previously changed glipizide to glimepiride 2 mg. FBS >150, PPG >200. Current therapy: metformin, glimepiride and pioglitazone.  Most recent relevant labs:   Lab Results   Component Value Date    HGBA1C 7.06 (H) 03/27/2018    HGBA1C 7.20 (H) 02/13/2017    " HGBA1C 7.4 (H) 12/11/2015    CREATININE 1.01 03/27/2018    LDL 81 03/27/2018    MICROALBUR 48.7 03/27/2018     Chronic essential hypertension:  Since prior visit: compliant with medication(s) and denies significant problems with medication(s).  Most recent in-office blood pressure readings:   BP Readings from Last 3 Encounters:   08/08/18 140/72   06/04/18 136/84   03/27/18 140/80     Chronic hyperlipidemia:  Current therapy include fenofibrate, denies problems with medication.    Most recent labs:   Lab Results   Component Value Date    LDL 81 03/27/2018    LDL 94 02/13/2017    LDL 97 12/11/2015    HDL 53 03/27/2018    HDL 49 02/13/2017    HDL 45 12/11/2015    TRIG 89 03/27/2018    CHOLHDLRATIO 2.87 03/27/2018    CHOLHDLRATIO 3.14 02/13/2017    CHOLHDLRATIO 3.5 12/11/2015         Patient Care Team:  Siva Godfrey MD as PCP - General  Siva Godfrey MD as PCP - Claims Attributed    ____________________________________________________________________    REVIEW OF SYSTEMS    Review of Systems  Constitutional neg  Eye neg  Resp neg  CV neg  GI neg      PHYSICAL EXAMINATION    Physical Exam  Constitutional  No distress  Cardiovascular Rate  normal . Rhythm: regular . Heart sounds:  Normal  Pulmonary/Chest  Effort normal. Breath sounds:  Normal  Psychiatric  Alert. Judgment and thought content normal. Mood normal    REVIEWED DATA:    Labs:     Lab Results   Component Value Date     03/27/2018    K 4.3 03/27/2018    AST 23 03/27/2018    ALT 15 03/27/2018    BUN 23 03/27/2018    CREATININE 1.01 03/27/2018    CREATININE 0.87 02/13/2017    CREATININE 0.96 12/11/2015    EGFRIFNONA 73 03/27/2018    EGFRIFAFRI 88 03/27/2018       Lab Results   Component Value Date    HGBA1C 7.06 (H) 03/27/2018    HGBA1C 7.20 (H) 02/13/2017    HGBA1C 7.4 (H) 12/11/2015    MICROALBUR 48.7 03/27/2018       Lab Results   Component Value Date    LDL 81 03/27/2018    LDL 94 02/13/2017    LDL 97 12/11/2015    HDL 53 03/27/2018    HDL 49 02/13/2017     HDL 45 12/11/2015    TRIG 89 03/27/2018    TRIG 57 02/13/2017    TRIG 80 12/11/2015    CHOLHDLRATIO 2.87 03/27/2018    CHOLHDLRATIO 3.14 02/13/2017    CHOLHDLRATIO 3.5 12/11/2015       Lab Results   Component Value Date    TSH 1.140 03/27/2018    FREET4 1.58 03/27/2018       No results found for: WBC, HGB, PLT    Imaging:   No results found.        Medical Tests:         Summary of old records / correspondence / consultant report:         Request outside records:         ALLERGIES  No Known Allergies     PFSH:     The following portions of the patient's history were reviewed and updated as appropriate: Allergies / Current Medications / Past Medical History / Surgical History / Social History / Family History    PROBLEM LIST   Patient Active Problem List   Diagnosis   • Type 2 diabetes mellitus (CMS/HCC)   • Atopic rhinitis   • Hyperlipidemia   • Hypertension   • Lumbago of lumbar region with sciatica       PAST MEDICAL HISTORY  Past Medical History:   Diagnosis Date   • Diabetes mellitus (CMS/HCC)    • Hyperlipidemia    • Hypertension        SURGICAL HISTORY  No past surgical history on file.    SOCIAL HISTORY  Social History     Social History   • Marital status:      Occupational History   • [employer] restaurant owner      Social History Main Topics   • Smoking status: Unknown If Ever Smoked   • Smokeless tobacco: Never Used   • Drug use: Unknown     Other Topics Concern   • Not on file       FAMILY HISTORY  No family history on file.      **Dragon Disclaimer:   Much of this encounter note is an electronic transcription/translation of spoken language to printed text. The electronic translation of spoken language may permit erroneous, or at times, nonsensical words or phrases to be inadvertently transcribed. Although I have reviewed the note for such errors, some may still exist.

## 2018-08-08 NOTE — ASSESSMENT & PLAN NOTE
Uncontrolled.  Increase glimepiride to 2 mg qAM and take 1/2 of 2 mg before supper. Continue other medications. Referral placed for diabetic eye exam.   
Right arm;

## 2018-09-28 ENCOUNTER — DOCUMENTATION (OUTPATIENT)
Dept: INTERNAL MEDICINE | Age: 73
End: 2018-09-28

## 2018-09-28 DIAGNOSIS — E11.9 TYPE 2 DIABETES MELLITUS WITHOUT COMPLICATION, UNSPECIFIED LONG TERM INSULIN USE STATUS: Chronic | ICD-10-CM

## 2018-09-28 DIAGNOSIS — I10 ESSENTIAL HYPERTENSION: Chronic | ICD-10-CM

## 2018-09-28 DIAGNOSIS — E78.2 MIXED HYPERLIPIDEMIA: Chronic | ICD-10-CM

## 2018-09-28 RX ORDER — LISINOPRIL 30 MG/1
30 TABLET ORAL DAILY
Qty: 90 TABLET | Refills: 1 | Status: SHIPPED | OUTPATIENT
Start: 2018-09-28 | End: 2019-09-29 | Stop reason: SDUPTHER

## 2018-09-28 RX ORDER — METFORMIN HYDROCHLORIDE 500 MG/1
1000 TABLET, EXTENDED RELEASE ORAL 2 TIMES DAILY
Qty: 360 TABLET | Refills: 1 | Status: SHIPPED | OUTPATIENT
Start: 2018-09-28 | End: 2019-09-18 | Stop reason: SDUPTHER

## 2018-09-28 RX ORDER — FENOFIBRATE 145 MG/1
145 TABLET, COATED ORAL DAILY
Qty: 30 TABLET | Refills: 5 | Status: SHIPPED | OUTPATIENT
Start: 2018-09-28 | End: 2019-10-07 | Stop reason: SDUPTHER

## 2018-09-28 RX ORDER — PIOGLITAZONEHYDROCHLORIDE 30 MG/1
30 TABLET ORAL EVERY MORNING
Qty: 90 TABLET | Refills: 1 | Status: SHIPPED | OUTPATIENT
Start: 2018-09-28 | End: 2019-05-28 | Stop reason: SDUPTHER

## 2018-09-28 RX ORDER — GLIMEPIRIDE 2 MG/1
TABLET ORAL
Qty: 135 TABLET | Refills: 1 | Status: SHIPPED | OUTPATIENT
Start: 2018-09-28 | End: 2018-11-06 | Stop reason: SDUPTHER

## 2018-11-06 ENCOUNTER — OFFICE VISIT (OUTPATIENT)
Dept: INTERNAL MEDICINE | Age: 73
End: 2018-11-06

## 2018-11-06 VITALS
OXYGEN SATURATION: 96 % | HEART RATE: 74 BPM | HEIGHT: 69 IN | DIASTOLIC BLOOD PRESSURE: 74 MMHG | SYSTOLIC BLOOD PRESSURE: 132 MMHG | TEMPERATURE: 99 F | WEIGHT: 212 LBS | BODY MASS INDEX: 31.4 KG/M2

## 2018-11-06 DIAGNOSIS — E78.2 MIXED HYPERLIPIDEMIA: Chronic | ICD-10-CM

## 2018-11-06 DIAGNOSIS — I10 ESSENTIAL HYPERTENSION: Chronic | ICD-10-CM

## 2018-11-06 DIAGNOSIS — E11.65 TYPE 2 DIABETES MELLITUS WITH HYPERGLYCEMIA, UNSPECIFIED WHETHER LONG TERM INSULIN USE (HCC): Primary | Chronic | ICD-10-CM

## 2018-11-06 PROCEDURE — 99214 OFFICE O/P EST MOD 30 MIN: CPT | Performed by: INTERNAL MEDICINE

## 2018-11-06 RX ORDER — GLIMEPIRIDE 2 MG/1
TABLET ORAL
Qty: 90 TABLET | Refills: 1 | COMMUNITY
Start: 2018-11-06 | End: 2018-11-27 | Stop reason: SDUPTHER

## 2018-11-06 NOTE — ASSESSMENT & PLAN NOTE
Check A1c. Referral to ophthalmologist again for diabetic exam.   Discussed dietary modifications. Continue glimepiride 2 mg qAM, metformin 500 mg 2 BID, and pioglitazone 30 mg qAM.

## 2018-11-06 NOTE — PROGRESS NOTES
[facility] [place] [facility]  PROSPER JONES M.D.      Meño Belarusian / 73 y.o. / male  11/06/2018      ASSESSMENT & PLAN:    Problem List Items Addressed This Visit        High    Type 2 diabetes mellitus with hyperglycemia (CMS/HCC) - Primary (Chronic)    Current Assessment & Plan     Check A1c. Referral to ophthalmologist again for diabetic exam.   Discussed dietary modifications. Continue glimepiride 2 mg qAM, metformin 500 mg 2 BID, and pioglitazone 30 mg qAM.          Relevant Medications    WAVESENSE PRESTO test strip    lisinopril (PRINIVIL,ZESTRIL) 30 MG tablet    metFORMIN ER (GLUCOPHAGE-XR) 500 MG 24 hr tablet    pioglitazone (ACTOS) 30 MG tablet    glimepiride (AMARYL) 2 MG tablet    Other Relevant Orders    Hemoglobin A1c    Comprehensive Metabolic Panel    Ambulatory Referral to Ophthalmology       Medium    Hyperlipidemia (Chronic)    Overview     Continue fenofibrate 145 mg          Relevant Medications    fenofibrate (TRICOR) 145 MG tablet    Hypertension (Chronic)    Overview     Stable. Continue lisinopril 30 mg qd.          Relevant Medications    lisinopril (PRINIVIL,ZESTRIL) 30 MG tablet    Other Relevant Orders    CBC & Differential        Orders Placed This Encounter   Procedures   • Hemoglobin A1c   • Comprehensive Metabolic Panel   • Ambulatory Referral to Ophthalmology   • CBC & Differential     No orders of the defined types were placed in this encounter.      Summary/Discussion:      Return in about 6 months (around 5/6/2019) for Diabetes and co-morbid conditions.  ____________________________________________________________________    MEDICATIONS  Current Outpatient Prescriptions   Medication Sig Dispense Refill   • fenofibrate (TRICOR) 145 MG tablet Take 1 tablet by mouth Daily for 270 days. 30 tablet 5   • fluticasone (FLONASE) 50 MCG/ACT nasal spray 2 sprays into each nostril Daily. 3 bottle 3   • glimepiride (AMARYL) 2 MG tablet Take 1 tablet in the morning with breakfast 90 tablet 1   • glucose  "blood (WAVESENSE PRESTO) test strip Check BS once daily 100 each 0   • glucose blood test strip [employer] Blood Glucose Test In Vitro Strip; Patient Sig: [employer] Blood Glucose Test In Vitro Strip TEST BLOOD SUGAR ONCE DAILY; 1; 3; 25-Sep-2015; Active     • [employer] LANCETS THIN 26G misc Use to test blood sugar one time daily. *PT MUST MAKE APPT FOR FUTURE REFILLS* 100 each 0   • [employer] LANCETS THIN 26G misc USE ONE LANCET TO TEST DAILY 100 each 1   • lisinopril (PRINIVIL,ZESTRIL) 30 MG tablet Take 1 tablet by mouth Daily. 90 tablet 1   • metFORMIN ER (GLUCOPHAGE-XR) 500 MG 24 hr tablet Take 2 tablets by mouth 2 (Two) Times a Day. 360 tablet 1   • WAVESENSE PRESTO test strip USE ONE STRIP TO TEST DAILY 100 each 2   • pioglitazone (ACTOS) 30 MG tablet Take 1 tablet by mouth Every Morning. 90 tablet 1     No current facility-administered medications for this visit.          VITALS    Visit Vitals  /74   Pulse 74   Temp 99 °F (37.2 °C)   Ht 175.3 cm (69.02\")   Wt 96.2 kg (212 lb)   SpO2 96%   BMI 31.29 kg/m²       BP Readings from Last 3 Encounters:   11/06/18 132/74   08/08/18 140/72   06/04/18 136/84     Wt Readings from Last 3 Encounters:   11/06/18 96.2 kg (212 lb)   08/08/18 93.4 kg (206 lb)   06/04/18 90.7 kg (200 lb)      Body mass index is 31.29 kg/m².    CC:  Main reason(s) for today's visit: Follow-up for diabetes and related medical problems    HPI:     Chronic type 2 diabetes:  Home blood sugar levels: about the same as before, has no significant low sugar symptoms/problems. Current therapy: metformin ER, glimepiride and pioglitazone.  Most recent relevant labs:   Lab Results   Component Value Date    HGBA1C 7.06 (H) 03/27/2018    HGBA1C 7.20 (H) 02/13/2017    HGBA1C 7.4 (H) 12/11/2015    CREATININE 1.01 03/27/2018    LDL 81 03/27/2018    MICROALBUR 48.7 03/27/2018     Chronic essential hypertension:  Since prior visit: compliant with medication(s) and denies significant problems with medication(s).  Most " recent in-office blood pressure readings:   BP Readings from Last 3 Encounters:   11/06/18 132/74   08/08/18 140/72   06/04/18 136/84     Chronic hyperlipidemia:  Current therapy include fenofibrate, denies problems with medication.    Most recent labs:   Lab Results   Component Value Date    LDL 81 03/27/2018    LDL 94 02/13/2017    LDL 97 12/11/2015    HDL 53 03/27/2018    HDL 49 02/13/2017    TRIG 89 03/27/2018    CHOLHDLRATIO 2.87 03/27/2018         Patient Care Team:  Siva Godfrey MD as PCP - General  Siva Godfrey MD as PCP - Claims Attributed  ____________________________________________________________________    REVIEW OF SYSTEMS    Review of Systems  As noted per HPI  Constitutional neg  Resp neg  CV neg      PHYSICAL EXAMINATION    Physical Exam  Constitutional  No distress  Cardiovascular Rate  normal . Rhythm: regular . Heart sounds:  normal  Pulmonary/Chest  Effort normal. Breath sounds:  normal  Psychiatric  Alert. Judgment and thought content normal. Mood normal    REVIEWED DATA:    Labs:   Lab Results   Component Value Date     03/27/2018    K 4.3 03/27/2018    AST 23 03/27/2018    ALT 15 03/27/2018    BUN 23 03/27/2018    CREATININE 1.01 03/27/2018    CREATININE 0.87 02/13/2017    CREATININE 0.96 12/11/2015    EGFRIFNONA 73 03/27/2018    EGFRIFAFRI 88 03/27/2018       Lab Results   Component Value Date    HGBA1C 7.06 (H) 03/27/2018    HGBA1C 7.20 (H) 02/13/2017    HGBA1C 7.4 (H) 12/11/2015    MICROALBUR 48.7 03/27/2018       Lab Results   Component Value Date    LDL 81 03/27/2018    LDL 94 02/13/2017    LDL 97 12/11/2015    HDL 53 03/27/2018    HDL 49 02/13/2017    TRIG 89 03/27/2018    CHOLHDLRATIO 2.87 03/27/2018       Lab Results   Component Value Date    TSH 1.140 03/27/2018    FREET4 1.58 03/27/2018        No results found for: WBC, HGB, PLT     Imaging:        Medical Tests:        Summary of old records / correspondence / consultant report:        Request outside records:         ALLERGIES  No Known Allergies     PFSH:     The following portions of the patient's history were reviewed and updated as appropriate: Allergies / Current Medications / Past Medical History / Surgical History / Social History / Family History    PROBLEM LIST   Patient Active Problem List   Diagnosis   • Type 2 diabetes mellitus with hyperglycemia (CMS/HCC)   • Atopic rhinitis   • Hyperlipidemia   • Hypertension   • Lumbago of lumbar region with sciatica       PAST MEDICAL HISTORY  Past Medical History:   Diagnosis Date   • Diabetes mellitus (CMS/HCC)    • Hyperlipidemia    • Hypertension        SURGICAL HISTORY  History reviewed. No pertinent surgical history.    SOCIAL HISTORY  Social History     Social History   • Marital status:      Occupational History   • [employer] restaurant owner      Social History Main Topics   • Smoking status: Unknown If Ever Smoked   • Smokeless tobacco: Never Used   • Drug use: Unknown     Other Topics Concern   • Not on file       FAMILY HISTORY  History reviewed. No pertinent family history.      **Roxy Disclaimer:   Much of this encounter note is an electronic transcription/translation of spoken language to printed text. The electronic translation of spoken language may permit erroneous, or at times, nonsensical words or phrases to be inadvertently transcribed. Although I have reviewed the note for such errors, some may still exist.

## 2018-11-07 ENCOUNTER — TELEPHONE (OUTPATIENT)
Dept: INTERNAL MEDICINE | Age: 73
End: 2018-11-07

## 2018-11-07 ENCOUNTER — RESULTS ENCOUNTER (OUTPATIENT)
Dept: INTERNAL MEDICINE | Age: 73
End: 2018-11-07

## 2018-11-07 DIAGNOSIS — Z12.11 SCREENING FOR COLON CANCER: ICD-10-CM

## 2018-11-07 DIAGNOSIS — Z12.11 SCREENING FOR COLON CANCER: Primary | ICD-10-CM

## 2018-11-07 LAB
ALBUMIN SERPL-MCNC: 4.8 G/DL (ref 3.5–5.2)
ALBUMIN/GLOB SERPL: 1.8 G/DL
ALP SERPL-CCNC: 49 U/L (ref 39–117)
ALT SERPL-CCNC: 13 U/L (ref 1–41)
AST SERPL-CCNC: 18 U/L (ref 1–40)
BASOPHILS # BLD AUTO: 0.03 10*3/MM3 (ref 0–0.2)
BASOPHILS NFR BLD AUTO: 0.4 % (ref 0–1.5)
BILIRUB SERPL-MCNC: 0.4 MG/DL (ref 0.1–1.2)
BUN SERPL-MCNC: 33 MG/DL (ref 8–23)
BUN/CREAT SERPL: 25 (ref 7–25)
CALCIUM SERPL-MCNC: 10.2 MG/DL (ref 8.6–10.5)
CHLORIDE SERPL-SCNC: 98 MMOL/L (ref 98–107)
CO2 SERPL-SCNC: 27.5 MMOL/L (ref 22–29)
CREAT SERPL-MCNC: 1.32 MG/DL (ref 0.76–1.27)
EOSINOPHIL # BLD AUTO: 0.04 10*3/MM3 (ref 0–0.7)
EOSINOPHIL NFR BLD AUTO: 0.5 % (ref 0.3–6.2)
ERYTHROCYTE [DISTWIDTH] IN BLOOD BY AUTOMATED COUNT: 13.5 % (ref 11.5–14.5)
GLOBULIN SER CALC-MCNC: 2.7 GM/DL
GLUCOSE SERPL-MCNC: 199 MG/DL (ref 65–99)
HBA1C MFR BLD: 6.7 % (ref 4.8–5.6)
HCT VFR BLD AUTO: 41.1 % (ref 40.4–52.2)
HGB BLD-MCNC: 13 G/DL (ref 13.7–17.6)
IMM GRANULOCYTES # BLD: 0.02 10*3/MM3 (ref 0–0.03)
IMM GRANULOCYTES NFR BLD: 0.3 % (ref 0–0.5)
LYMPHOCYTES # BLD AUTO: 1.4 10*3/MM3 (ref 0.9–4.8)
LYMPHOCYTES NFR BLD AUTO: 19.2 % (ref 19.6–45.3)
MCH RBC QN AUTO: 29.1 PG (ref 27–32.7)
MCHC RBC AUTO-ENTMCNC: 31.6 G/DL (ref 32.6–36.4)
MCV RBC AUTO: 91.9 FL (ref 79.8–96.2)
MONOCYTES # BLD AUTO: 0.46 10*3/MM3 (ref 0.2–1.2)
MONOCYTES NFR BLD AUTO: 6.3 % (ref 5–12)
NEUTROPHILS # BLD AUTO: 5.35 10*3/MM3 (ref 1.9–8.1)
NEUTROPHILS NFR BLD AUTO: 73.3 % (ref 42.7–76)
PLATELET # BLD AUTO: 235 10*3/MM3 (ref 140–500)
POTASSIUM SERPL-SCNC: 4.9 MMOL/L (ref 3.5–5.2)
PROT SERPL-MCNC: 7.5 G/DL (ref 6–8.5)
RBC # BLD AUTO: 4.47 10*6/MM3 (ref 4.6–6)
SODIUM SERPL-SCNC: 139 MMOL/L (ref 136–145)
WBC # BLD AUTO: 7.3 10*3/MM3 (ref 4.5–10.7)

## 2018-11-07 NOTE — TELEPHONE ENCOUNTER
----- Message from Siva Godfrey MD sent at 11/7/2018  7:44 AM EST -----  Call patient's son with his test result(s) and mail the results to him if MyChart is NOT active.    1. Diabetes is better. Continue current plans.   2. Kidney function has somewhat declined. Avoid any OTC NSAIDS. Maintain good hydration. RECHECK BMP IN 1 MONTH (DX: acute renal insufficiency)  3. Mild anemia is noted. Recheck in 1 month along with iron studies. (CBC, iron, ferritin). Be sure to do the Cologuard test which has been ordered.

## 2018-11-27 DIAGNOSIS — E11.9 TYPE 2 DIABETES MELLITUS WITHOUT COMPLICATION (HCC): ICD-10-CM

## 2018-11-27 RX ORDER — GLIMEPIRIDE 2 MG/1
TABLET ORAL
Qty: 90 TABLET | Refills: 2 | Status: SHIPPED | OUTPATIENT
Start: 2018-11-27 | End: 2019-04-24 | Stop reason: SDUPTHER

## 2018-12-05 DIAGNOSIS — D64.9 ANEMIA, UNSPECIFIED TYPE: ICD-10-CM

## 2018-12-05 DIAGNOSIS — E11.65 TYPE 2 DIABETES MELLITUS WITH HYPERGLYCEMIA, UNSPECIFIED WHETHER LONG TERM INSULIN USE (HCC): Primary | Chronic | ICD-10-CM

## 2018-12-05 DIAGNOSIS — N28.9 KIDNEY DYSFUNCTION: ICD-10-CM

## 2018-12-06 LAB
BASOPHILS # BLD AUTO: 0.06 10*3/MM3 (ref 0–0.2)
BASOPHILS NFR BLD AUTO: 1.1 % (ref 0–1.5)
BUN SERPL-MCNC: 20 MG/DL (ref 8–23)
BUN/CREAT SERPL: 20.4 (ref 7–25)
CALCIUM SERPL-MCNC: 9.4 MG/DL (ref 8.6–10.5)
CHLORIDE SERPL-SCNC: 103 MMOL/L (ref 98–107)
CO2 SERPL-SCNC: 28.7 MMOL/L (ref 22–29)
CREAT SERPL-MCNC: 0.98 MG/DL (ref 0.76–1.27)
EOSINOPHIL # BLD AUTO: 0.25 10*3/MM3 (ref 0–0.7)
EOSINOPHIL NFR BLD AUTO: 4.6 % (ref 0.3–6.2)
ERYTHROCYTE [DISTWIDTH] IN BLOOD BY AUTOMATED COUNT: 13.7 % (ref 11.5–14.5)
FERRITIN SERPL-MCNC: 76.03 NG/ML (ref 30–400)
GLUCOSE SERPL-MCNC: 111 MG/DL (ref 65–99)
HCT VFR BLD AUTO: 44.1 % (ref 40.4–52.2)
HGB BLD-MCNC: 13.5 G/DL (ref 13.7–17.6)
IMM GRANULOCYTES # BLD: 0.02 10*3/MM3 (ref 0–0.03)
IMM GRANULOCYTES NFR BLD: 0.4 % (ref 0–0.5)
IRON SATN MFR SERPL: 17 % (ref 20–50)
IRON SERPL-MCNC: 89 MCG/DL (ref 59–158)
LYMPHOCYTES # BLD AUTO: 2.17 10*3/MM3 (ref 0.9–4.8)
LYMPHOCYTES NFR BLD AUTO: 40.3 % (ref 19.6–45.3)
MCH RBC QN AUTO: 29 PG (ref 27–32.7)
MCHC RBC AUTO-ENTMCNC: 30.6 G/DL (ref 32.6–36.4)
MCV RBC AUTO: 94.6 FL (ref 79.8–96.2)
MONOCYTES # BLD AUTO: 0.5 10*3/MM3 (ref 0.2–1.2)
MONOCYTES NFR BLD AUTO: 9.3 % (ref 5–12)
NEUTROPHILS # BLD AUTO: 2.38 10*3/MM3 (ref 1.9–8.1)
NEUTROPHILS NFR BLD AUTO: 44.3 % (ref 42.7–76)
PLATELET # BLD AUTO: 251 10*3/MM3 (ref 140–500)
POTASSIUM SERPL-SCNC: 4.3 MMOL/L (ref 3.5–5.2)
RBC # BLD AUTO: 4.66 10*6/MM3 (ref 4.6–6)
SODIUM SERPL-SCNC: 142 MMOL/L (ref 136–145)
TIBC SERPL-MCNC: 532 MCG/DL
UIBC SERPL-MCNC: 443 MCG/DL
WBC # BLD AUTO: 5.38 10*3/MM3 (ref 4.5–10.7)

## 2018-12-07 ENCOUNTER — TELEPHONE (OUTPATIENT)
Dept: INTERNAL MEDICINE | Age: 73
End: 2018-12-07

## 2018-12-07 NOTE — PROGRESS NOTES
Send result letter to patient.    Bang, these are your recent lab results:    Overall labs are stable.   Anemia is very mild and stable. Please be sure to have returned to Cologuard stool study as ordered.     Please call my office if you have any questions. Otherwise, we can discuss the results in further detail on your next visit.

## 2018-12-07 NOTE — TELEPHONE ENCOUNTER
----- Message from Siva Godfrey MD sent at 12/7/2018  7:45 AM EST -----  Send result letter to patient.     Bang, these are your recent lab results:    Overall labs are stable.   Anemia is very mild and stable. Please be sure to have returned to Cologuard stool study as ordered.     Please call my office if you have any questions. Otherwise, we can discuss the results in further detail on your next visit.

## 2019-01-02 DIAGNOSIS — E11.9 TYPE 2 DIABETES MELLITUS WITHOUT COMPLICATION (HCC): ICD-10-CM

## 2019-01-02 RX ORDER — BLOOD SUGAR DIAGNOSTIC
STRIP MISCELLANEOUS
Qty: 100 EACH | Refills: 12 | Status: SHIPPED | OUTPATIENT
Start: 2019-01-02 | End: 2020-05-21

## 2019-03-22 ENCOUNTER — OFFICE VISIT (OUTPATIENT)
Dept: INTERNAL MEDICINE | Age: 74
End: 2019-03-22

## 2019-03-22 VITALS
HEIGHT: 69 IN | OXYGEN SATURATION: 95 % | SYSTOLIC BLOOD PRESSURE: 156 MMHG | WEIGHT: 210 LBS | BODY MASS INDEX: 31.1 KG/M2 | HEART RATE: 62 BPM | DIASTOLIC BLOOD PRESSURE: 80 MMHG | TEMPERATURE: 98.4 F

## 2019-03-22 DIAGNOSIS — I13.0 HYPERTENSIVE HEART AND CHRONIC KIDNEY DISEASE WITH HEART FAILURE AND STAGE 1 THROUGH STAGE 4 CHRONIC KIDNEY DISEASE, OR CHRONIC KIDNEY DISEASE (HCC): ICD-10-CM

## 2019-03-22 DIAGNOSIS — E11.9 TYPE 2 DIABETES MELLITUS WITHOUT COMPLICATION, WITHOUT LONG-TERM CURRENT USE OF INSULIN (HCC): ICD-10-CM

## 2019-03-22 DIAGNOSIS — I10 ESSENTIAL HYPERTENSION: Chronic | ICD-10-CM

## 2019-03-22 DIAGNOSIS — R60.1 GENERALIZED EDEMA: Primary | ICD-10-CM

## 2019-03-22 PROCEDURE — 99214 OFFICE O/P EST MOD 30 MIN: CPT | Performed by: INTERNAL MEDICINE

## 2019-03-22 RX ORDER — FUROSEMIDE 20 MG/1
20 TABLET ORAL EVERY MORNING
Qty: 30 TABLET | Refills: 0 | Status: SHIPPED | OUTPATIENT
Start: 2019-03-22 | End: 2019-04-18 | Stop reason: SDUPTHER

## 2019-03-22 NOTE — ASSESSMENT & PLAN NOTE
Start furosemide 20 mg qd for generalized edema and elevated blood pressure.   Will have him update me on status of edema in 2 weeks. Plan to check BMP at that time.   Maintain low sodium diet of less than 3 gm.

## 2019-03-22 NOTE — PROGRESS NOTES
[facility] [place] [facility]  PROSPER JONES M.D.      Meño Kinyarwanda / 73 y.o. / male  03/22/2019      ASSESSMENT & PLAN:    Problem List Items Addressed This Visit        High    Hypertension (Chronic)    Current Assessment & Plan     Start furosemide 20 mg qd for generalized edema and elevated blood pressure.   Will have him update me on status of edema in 2 weeks. Plan to check BMP at that time.   Maintain low sodium diet of less than 3 gm.           Relevant Medications    lisinopril (PRINIVIL,ZESTRIL) 30 MG tablet    furosemide (LASIX) 20 MG tablet    Generalized edema - Primary    Current Assessment & Plan     Start furosemide 20 mg qd. Sodium restriction to < 3 gm daily.   Compression hose stockings recommended.          Relevant Medications    furosemide (LASIX) 20 MG tablet    Other Relevant Orders    TSH+Free T4    BNP      Other Visit Diagnoses     Type 2 diabetes mellitus without complication, without long-term current use of insulin (CMS/[facility])        Relevant Orders    Comprehensive Metabolic Panel    Urinalysis With Microscopic If Indicated (No Culture) - Urine, Clean Catch    Hypertensive heart and chronic kidney disease with heart failure and stage 1 through stage 4 chronic kidney disease, or chronic kidney disease (CMS/[facility])         Relevant Medications    furosemide (LASIX) 20 MG tablet    Other Relevant Orders    BNP        Orders Placed This Encounter   Procedures   • Comprehensive Metabolic Panel   • TSH+Free T4   • Urinalysis With Microscopic If Indicated (No Culture) - Urine, Clean Catch   • BNP     New Medications Ordered This Visit   Medications   • furosemide (LASIX) 20 MG tablet     Sig: Take 1 tablet by mouth Every Morning.     Dispense:  30 tablet     Refill:  0       Summary/Discussion:  ·     Return for worsening problem or if not improving, Next scheduled follow up.    ____________________________________________________________________    MEDICATIONS  Current Outpatient Medications   Medication Sig  "Dispense Refill   • fluticasone (FLONASE) 50 MCG/ACT nasal spray 2 sprays into each nostril Daily. 3 bottle 3   • glimepiride (AMARYL) 2 MG tablet TAKE ONE TABLET BY MOUTH EVERY MORNING BEFORE BREAKFAST 90 tablet 2   • glucose blood (WAVESENSE PRESTO) test strip Check BS once daily 100 each 0   • glucose blood test strip [employer] Blood Glucose Test In Vitro Strip; Patient Sig: [employer] Blood Glucose Test In Vitro Strip TEST BLOOD SUGAR ONCE DAILY; 1; 3; 25-Sep-2015; Active     • [employer] LANCETS THIN 26G misc Use to test blood sugar one time daily. *PT MUST MAKE APPT FOR FUTURE REFILLS* 100 each 0   • [employer] LANCETS THIN 26G misc USE ONE LANCET TO TEST DAILY 100 each 1   • lisinopril (PRINIVIL,ZESTRIL) 30 MG tablet Take 1 tablet by mouth Daily. 90 tablet 1   • metFORMIN ER (GLUCOPHAGE-XR) 500 MG 24 hr tablet Take 2 tablets by mouth 2 (Two) Times a Day. 360 tablet 1   • pioglitazone (ACTOS) 30 MG tablet Take 1 tablet by mouth Every Morning. 90 tablet 1   • AGAMATRIX PRESTO TEST test strip USE ONE STRIP TO TEST BLOOD SUGAR DAILY 100 each 12   • fenofibrate (TRICOR) 145 MG tablet Take 1 tablet by mouth Daily for 270 days. 30 tablet 5     No current facility-administered medications for this visit.           VITALS:    Visit Vitals  /80 (BP Location: Right arm)   Pulse 62   Temp 98.4 °F (36.9 °C)   Ht 175.3 cm (69.02\")   Wt 95.3 kg (210 lb)   SpO2 95%   BMI 31.00 kg/m²       BP Readings from Last 3 Encounters:   03/22/19 156/80   11/06/18 132/74   08/08/18 140/72     Wt Readings from Last 3 Encounters:   03/22/19 95.3 kg (210 lb)   11/06/18 96.2 kg (212 lb)   08/08/18 93.4 kg (206 lb)      Body mass index is 31 kg/m².    CC:  Main reason(s) for today's visit: ankles swollen x 1 month      HPI:     Complains of increased swelling of BLE x 1 month. He feels swollen all over. Weight gain noted. Has tried to limit sodium intake over 1-2 weeks but has not noticed change in swelling. Denies chest pain, GUILLORY, PND/orthopnea. " Denies change in urination.     Chronic essential hypertension:  Since prior visit: compliant with medication(s) and c/o excessive swelling.  Most recent in-office blood pressure readings:   BP Readings from Last 3 Encounters:   03/22/19 156/80   11/06/18 132/74   08/08/18 140/72     Taking same medications for diabetes including 30 mg pioglitazone.     Patient Care Team:  Siva Godfrey MD as PCP - General  Siva Godfrey MD as PCP - Claims Attributed    ____________________________________________________________________    REVIEW OF SYSTEMS    Review of Systems  Constitutional weight gain, generalized edema  Resp neg  CV neg for chest pain, PND/orthopnea, palpitations    neg for change in urination       PHYSICAL EXAMINATION    Physical Exam  Constitutional  No distress, appears edematous   Cardiovascular Rate  normal . Rhythm: regular . Heart sounds:  Normal. 2+ BLE edema. Hands are edematous.   Pulmonary/Chest  Effort normal. Breath sounds:  Normal without rales or wheezing .  Psychiatric  Alert. Judgment and thought content normal. Mood normal    REVIEWED DATA:    Labs:     Lab Results   Component Value Date     12/03/2018    K 4.3 12/03/2018    AST 18 11/06/2018    ALT 13 11/06/2018    BUN 20 12/03/2018    CREATININE 0.98 12/03/2018    CREATININE 1.32 (H) 11/06/2018    CREATININE 1.01 03/27/2018    EGFRIFNONA 75 12/03/2018    EGFRIFAFRI 91 12/03/2018       Lab Results   Component Value Date    HGBA1C 6.70 (H) 11/06/2018    HGBA1C 7.06 (H) 03/27/2018    HGBA1C 7.20 (H) 02/13/2017    MICROALBUR 48.7 03/27/2018       Lab Results   Component Value Date    LDL 81 03/27/2018    LDL 94 02/13/2017    LDL 97 12/11/2015    HDL 53 03/27/2018    HDL 49 02/13/2017    HDL 45 12/11/2015    TRIG 89 03/27/2018    TRIG 57 02/13/2017    TRIG 80 12/11/2015    CHOLHDLRATIO 2.87 03/27/2018    CHOLHDLRATIO 3.14 02/13/2017    CHOLHDLRATIO 3.5 12/11/2015       Lab Results   Component Value Date    TSH 1.140 03/27/2018    FREET4  1.58 03/27/2018       Lab Results   Component Value Date    WBC 5.38 12/03/2018    HGB 13.5 (L) 12/03/2018    HGB 13.0 (L) 11/06/2018     12/03/2018       Lab Results   Component Value Date    PROTEIN Negative 03/27/2018    GLUCOSEU Negative 03/27/2018    BLOODU Negative 03/27/2018    NITRITEU Negative 03/27/2018    LEUKOCYTESUR Negative 03/27/2018       Imaging:         Medical Tests:         Summary of old records / correspondence / consultant report:         Request outside records:         ALLERGIES  No Known Allergies     PFSH:     The following portions of the patient's history were reviewed and updated as appropriate: Allergies / Current Medications / Past Medical History / Surgical History / Social History / Family History    PROBLEM LIST   Patient Active Problem List   Diagnosis   • Type 2 diabetes mellitus with hyperglycemia (CMS/HCC)   • Atopic rhinitis   • Hyperlipidemia   • Hypertension   • Lumbago of lumbar region with sciatica   • Generalized edema       PAST MEDICAL HISTORY  Past Medical History:   Diagnosis Date   • Diabetes mellitus (CMS/HCC)    • Hyperlipidemia    • Hypertension        SURGICAL HISTORY  History reviewed. No pertinent surgical history.    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 No pertinent family history.      **Dragon Disclaimer:   Much of this encounter note is an electronic transcription/translation of spoken language to printed text. The electronic translation of spoken language may permit erroneous, or at times, nonsensical words or phrases to be inadvertently transcribed. Although I have  reviewed the note for such errors, some may still exist.       Template created by Sugey Godfrey MD

## 2019-03-22 NOTE — ASSESSMENT & PLAN NOTE
Start furosemide 20 mg qd. Sodium restriction to < 3 gm daily.   Compression hose stockings recommended.

## 2019-03-23 LAB
ALBUMIN SERPL-MCNC: 4.9 G/DL (ref 3.5–4.8)
ALBUMIN/GLOB SERPL: 2.3 {RATIO} (ref 1.2–2.2)
ALP SERPL-CCNC: 49 IU/L (ref 39–117)
ALT SERPL-CCNC: 13 IU/L (ref 0–44)
AST SERPL-CCNC: 24 IU/L (ref 0–40)
BILIRUB SERPL-MCNC: 0.3 MG/DL (ref 0–1.2)
BNP SERPL-MCNC: 29.6 PG/ML (ref 0–100)
BUN SERPL-MCNC: 19 MG/DL (ref 8–27)
BUN/CREAT SERPL: 19 (ref 10–24)
CALCIUM SERPL-MCNC: 9.7 MG/DL (ref 8.6–10.2)
CHLORIDE SERPL-SCNC: 101 MMOL/L (ref 96–106)
CO2 SERPL-SCNC: 22 MMOL/L (ref 20–29)
CREAT SERPL-MCNC: 1.02 MG/DL (ref 0.76–1.27)
GLOBULIN SER CALC-MCNC: 2.1 G/DL (ref 1.5–4.5)
GLUCOSE SERPL-MCNC: 91 MG/DL (ref 65–99)
POTASSIUM SERPL-SCNC: 4.2 MMOL/L (ref 3.5–5.2)
PROT SERPL-MCNC: 7 G/DL (ref 6–8.5)
SODIUM SERPL-SCNC: 137 MMOL/L (ref 134–144)
T4 FREE SERPL-MCNC: 1.65 NG/DL (ref 0.82–1.77)
TSH SERPL DL<=0.005 MIU/L-ACNC: 0.86 UIU/ML (ref 0.45–4.5)
UNABLE TO VOID: NORMAL

## 2019-03-25 ENCOUNTER — TELEPHONE (OUTPATIENT)
Dept: INTERNAL MEDICINE | Age: 74
End: 2019-03-25

## 2019-03-25 NOTE — TELEPHONE ENCOUNTER
----- Message from Siva Godfrey MD sent at 3/24/2019  8:45 PM EDT -----  Send results:     Kidney and liver labs were normal.   Thyroid level was normal.  Heart failure marker was normal.     Continue same plans as discussed in office

## 2019-03-29 DIAGNOSIS — J30.9 ALLERGIC RHINITIS: ICD-10-CM

## 2019-03-29 RX ORDER — FLUTICASONE PROPIONATE 50 MCG
SPRAY, SUSPENSION (ML) NASAL
Qty: 16 G | Refills: 2 | Status: SHIPPED | OUTPATIENT
Start: 2019-03-29 | End: 2020-05-06 | Stop reason: SDUPTHER

## 2019-04-18 DIAGNOSIS — I10 ESSENTIAL HYPERTENSION: Chronic | ICD-10-CM

## 2019-04-18 DIAGNOSIS — R60.1 GENERALIZED EDEMA: ICD-10-CM

## 2019-04-18 RX ORDER — FUROSEMIDE 20 MG/1
TABLET ORAL
Qty: 30 TABLET | Refills: 5 | Status: SHIPPED | OUTPATIENT
Start: 2019-04-18 | End: 2019-11-12 | Stop reason: SDUPTHER

## 2019-04-24 DIAGNOSIS — E11.9 TYPE 2 DIABETES MELLITUS WITHOUT COMPLICATION (HCC): ICD-10-CM

## 2019-04-24 RX ORDER — GLIMEPIRIDE 2 MG/1
TABLET ORAL
Qty: 135 TABLET | Refills: 2 | Status: SHIPPED | OUTPATIENT
Start: 2019-04-24 | End: 2019-05-14

## 2019-05-14 ENCOUNTER — OFFICE VISIT (OUTPATIENT)
Dept: INTERNAL MEDICINE | Age: 74
End: 2019-05-14

## 2019-05-14 VITALS
WEIGHT: 210 LBS | HEART RATE: 64 BPM | OXYGEN SATURATION: 98 % | SYSTOLIC BLOOD PRESSURE: 128 MMHG | TEMPERATURE: 97.3 F | HEIGHT: 69 IN | DIASTOLIC BLOOD PRESSURE: 74 MMHG | BODY MASS INDEX: 31.1 KG/M2

## 2019-05-14 DIAGNOSIS — R60.1 GENERALIZED EDEMA: ICD-10-CM

## 2019-05-14 DIAGNOSIS — E11.9 TYPE 2 DIABETES MELLITUS WITHOUT COMPLICATION (HCC): ICD-10-CM

## 2019-05-14 DIAGNOSIS — E78.2 MIXED HYPERLIPIDEMIA: Chronic | ICD-10-CM

## 2019-05-14 DIAGNOSIS — E11.65 TYPE 2 DIABETES MELLITUS WITH HYPERGLYCEMIA, WITHOUT LONG-TERM CURRENT USE OF INSULIN (HCC): Primary | Chronic | ICD-10-CM

## 2019-05-14 DIAGNOSIS — I10 ESSENTIAL HYPERTENSION: Chronic | ICD-10-CM

## 2019-05-14 DIAGNOSIS — Z23 NEED FOR PNEUMOCOCCAL VACCINE: ICD-10-CM

## 2019-05-14 PROCEDURE — G0009 ADMIN PNEUMOCOCCAL VACCINE: HCPCS | Performed by: INTERNAL MEDICINE

## 2019-05-14 PROCEDURE — 99214 OFFICE O/P EST MOD 30 MIN: CPT | Performed by: INTERNAL MEDICINE

## 2019-05-14 PROCEDURE — 90732 PPSV23 VACC 2 YRS+ SUBQ/IM: CPT | Performed by: INTERNAL MEDICINE

## 2019-05-14 RX ORDER — GLIMEPIRIDE 2 MG/1
1 TABLET ORAL
COMMUNITY
Start: 2019-05-14 | End: 2020-02-03

## 2019-05-14 NOTE — ASSESSMENT & PLAN NOTE
Lab Results   Component Value Date    HGBA1C 6.70 (H) 11/06/2018    HGBA1C 7.06 (H) 03/27/2018    HGBA1C 7.20 (H) 02/13/2017      Continue metformin  mg 2 BID, pioglitazone 30 mg qd, and glimepiride 2 mg 1/2 qAM.

## 2019-05-15 LAB
ALBUMIN SERPL-MCNC: 4.2 G/DL (ref 3.5–5.2)
ALBUMIN/CREAT UR: 27.5 MG/G CREAT (ref 0–30)
ALBUMIN/GLOB SERPL: 1.4 G/DL
ALP SERPL-CCNC: 45 U/L (ref 39–117)
ALT SERPL-CCNC: 16 U/L (ref 1–41)
APPEARANCE UR: CLEAR
AST SERPL-CCNC: 27 U/L (ref 1–40)
BILIRUB SERPL-MCNC: 0.3 MG/DL (ref 0.2–1.2)
BILIRUB UR QL STRIP: NEGATIVE
BUN SERPL-MCNC: 25 MG/DL (ref 8–23)
BUN/CREAT SERPL: 19.5 (ref 7–25)
CALCIUM SERPL-MCNC: 10.3 MG/DL (ref 8.6–10.5)
CHLORIDE SERPL-SCNC: 103 MMOL/L (ref 98–107)
CHOLEST SERPL-MCNC: 136 MG/DL (ref 0–200)
CHOLEST/HDLC SERPL: 2.83 {RATIO}
CO2 SERPL-SCNC: 26.8 MMOL/L (ref 22–29)
COLOR UR: YELLOW
CREAT SERPL-MCNC: 1.28 MG/DL (ref 0.76–1.27)
CREAT UR-MCNC: 37.4 MG/DL
GLOBULIN SER CALC-MCNC: 2.9 GM/DL
GLUCOSE SERPL-MCNC: 73 MG/DL (ref 65–99)
GLUCOSE UR QL: NEGATIVE
HBA1C MFR BLD: 6.9 % (ref 4.8–5.6)
HDLC SERPL-MCNC: 48 MG/DL (ref 40–60)
HGB UR QL STRIP: NEGATIVE
KETONES UR QL STRIP: NEGATIVE
LDLC SERPL CALC-MCNC: 66 MG/DL (ref 0–100)
LEUKOCYTE ESTERASE UR QL STRIP: NEGATIVE
MICROALBUMIN UR-MCNC: 10.3 UG/ML
NITRITE UR QL STRIP: NEGATIVE
PH UR STRIP: 6 [PH] (ref 5–8)
POTASSIUM SERPL-SCNC: 4.8 MMOL/L (ref 3.5–5.2)
PROT SERPL-MCNC: 7.1 G/DL (ref 6–8.5)
PROT UR QL STRIP: NEGATIVE
SODIUM SERPL-SCNC: 142 MMOL/L (ref 136–145)
SP GR UR: 1.01 (ref 1–1.03)
TRIGL SERPL-MCNC: 110 MG/DL (ref 0–150)
UROBILINOGEN UR STRIP-MCNC: NORMAL MG/DL
VLDLC SERPL CALC-MCNC: 22 MG/DL

## 2019-05-16 ENCOUNTER — TELEPHONE (OUTPATIENT)
Dept: INTERNAL MEDICINE | Age: 74
End: 2019-05-16

## 2019-05-16 NOTE — TELEPHONE ENCOUNTER
----- Message from Siva Godfrey MD sent at 5/15/2019  6:03 PM EDT -----  Send result letter to patient.     Mr. Salas, these are your recent lab results:    Diabetes and cholesterol are overall stable.   Continue current plans.     Please call my office if you have any questions. Otherwise, we can discuss the results in further detail on your next visit.

## 2019-05-21 ENCOUNTER — DOCUMENTATION (OUTPATIENT)
Dept: INTERNAL MEDICINE | Age: 74
End: 2019-05-21

## 2019-05-28 DIAGNOSIS — E11.9 TYPE 2 DIABETES MELLITUS WITHOUT COMPLICATION (HCC): ICD-10-CM

## 2019-05-30 RX ORDER — PIOGLITAZONEHYDROCHLORIDE 30 MG/1
TABLET ORAL
Qty: 90 TABLET | Refills: 0 | Status: SHIPPED | OUTPATIENT
Start: 2019-05-30 | End: 2019-08-24 | Stop reason: SDUPTHER

## 2019-06-13 DIAGNOSIS — E11.9 CONTROLLED TYPE 2 DIABETES MELLITUS WITHOUT COMPLICATION (HCC): ICD-10-CM

## 2019-06-13 RX ORDER — LANCETS
EACH MISCELLANEOUS
Qty: 100 EACH | Refills: 11 | Status: SHIPPED | OUTPATIENT
Start: 2019-06-13 | End: 2021-06-03

## 2019-08-24 DIAGNOSIS — E11.9 TYPE 2 DIABETES MELLITUS WITHOUT COMPLICATION (HCC): ICD-10-CM

## 2019-08-26 RX ORDER — PIOGLITAZONEHYDROCHLORIDE 30 MG/1
TABLET ORAL
Qty: 90 TABLET | Refills: 0 | Status: SHIPPED | OUTPATIENT
Start: 2019-08-26 | End: 2020-01-15

## 2019-09-18 DIAGNOSIS — E11.9 TYPE 2 DIABETES MELLITUS WITHOUT COMPLICATION (HCC): ICD-10-CM

## 2019-09-18 RX ORDER — METFORMIN HYDROCHLORIDE 500 MG/1
TABLET, EXTENDED RELEASE ORAL
Qty: 360 TABLET | Refills: 0 | Status: SHIPPED | OUTPATIENT
Start: 2019-09-18 | End: 2019-12-16 | Stop reason: SDUPTHER

## 2019-09-29 DIAGNOSIS — E11.9 TYPE 2 DIABETES MELLITUS WITHOUT COMPLICATION (HCC): ICD-10-CM

## 2019-09-29 DIAGNOSIS — I10 ESSENTIAL HYPERTENSION: Chronic | ICD-10-CM

## 2019-09-30 RX ORDER — LISINOPRIL 30 MG/1
30 TABLET ORAL DAILY
Qty: 30 TABLET | Refills: 0 | Status: SHIPPED | OUTPATIENT
Start: 2019-09-30 | End: 2019-11-05 | Stop reason: SDUPTHER

## 2019-10-07 DIAGNOSIS — E78.2 MIXED HYPERLIPIDEMIA: Chronic | ICD-10-CM

## 2019-10-09 RX ORDER — FENOFIBRATE 145 MG/1
TABLET, COATED ORAL
Qty: 30 TABLET | Refills: 11 | Status: SHIPPED | OUTPATIENT
Start: 2019-10-09 | End: 2020-12-07

## 2019-10-31 DIAGNOSIS — I10 ESSENTIAL HYPERTENSION: Chronic | ICD-10-CM

## 2019-10-31 DIAGNOSIS — E11.9 TYPE 2 DIABETES MELLITUS WITHOUT COMPLICATION (HCC): ICD-10-CM

## 2019-11-01 ENCOUNTER — TELEPHONE (OUTPATIENT)
Dept: INTERNAL MEDICINE | Age: 74
End: 2019-11-01

## 2019-11-04 RX ORDER — LISINOPRIL 30 MG/1
TABLET ORAL
Qty: 30 TABLET | Refills: 0 | OUTPATIENT
Start: 2019-11-04

## 2019-11-05 ENCOUNTER — TELEPHONE (OUTPATIENT)
Dept: INTERNAL MEDICINE | Age: 74
End: 2019-11-05

## 2019-11-05 DIAGNOSIS — E11.9 TYPE 2 DIABETES MELLITUS WITHOUT COMPLICATION (HCC): ICD-10-CM

## 2019-11-05 DIAGNOSIS — I10 ESSENTIAL HYPERTENSION: Chronic | ICD-10-CM

## 2019-11-05 NOTE — TELEPHONE ENCOUNTER
"Please see attached note - pt son is on release to speak with. Pt son is asking for refill for pt on Lisinopril which was filled on 09.30.19 with note on Rx to schedule an appt for further refills.  Pt son states \"Dr. Godfrey will work patients in\" and told him over the phone that they do not need an appointment. This is not documented in the chart.   Pt was last seen on 05.14.19 and instructed to RTO in August.     Please advise.     I am unable to override system to enter appointments.     "

## 2019-11-05 NOTE — TELEPHONE ENCOUNTER
Okay to refill the lisinopril for total of 6 months supply.   Tell the son his father does need a follow-up appointment to be seen within 2-3 months.

## 2019-11-05 NOTE — TELEPHONE ENCOUNTER
Christian Salas-son is requesting a rx refill of lisinopril 30mg on behalf of the pt.    I explained to Christian that the pt needs an appt before any rx's can be refilled.    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    [employer] #[phone].    Pls advise.    Christian can be reached #[phone].

## 2019-11-06 RX ORDER — LISINOPRIL 30 MG/1
TABLET ORAL
Qty: 30 TABLET | Refills: 5 | Status: SHIPPED | OUTPATIENT
Start: 2019-11-06 | End: 2020-05-04

## 2019-11-12 DIAGNOSIS — R60.1 GENERALIZED EDEMA: ICD-10-CM

## 2019-11-12 DIAGNOSIS — I10 ESSENTIAL HYPERTENSION: Chronic | ICD-10-CM

## 2019-11-12 RX ORDER — FUROSEMIDE 20 MG/1
TABLET ORAL
Qty: 30 TABLET | Refills: 5 | Status: SHIPPED | OUTPATIENT
Start: 2019-11-12 | End: 2020-02-05

## 2019-11-12 NOTE — TELEPHONE ENCOUNTER
"PT has not been seen since 5/14/19, advised to f/u in 3 months from that visit, which would be approximately 8/14/2019.     Per your note 11/5/2019: \"Okay to refill the lisinopril for total of 6 months supply.   Tell the son his father does need a follow-up appointment to be seen within 2-3 months.\"     Please sign order.     "

## 2019-12-16 DIAGNOSIS — E11.9 TYPE 2 DIABETES MELLITUS WITHOUT COMPLICATION (HCC): ICD-10-CM

## 2019-12-16 RX ORDER — METFORMIN HYDROCHLORIDE 500 MG/1
TABLET, EXTENDED RELEASE ORAL
Qty: 360 TABLET | Refills: 1 | Status: SHIPPED | OUTPATIENT
Start: 2019-12-16 | End: 2020-07-06

## 2020-01-15 DIAGNOSIS — E11.9 TYPE 2 DIABETES MELLITUS WITHOUT COMPLICATION (HCC): ICD-10-CM

## 2020-01-15 RX ORDER — PIOGLITAZONEHYDROCHLORIDE 30 MG/1
TABLET ORAL
Qty: 90 TABLET | Refills: 6 | Status: SHIPPED | OUTPATIENT
Start: 2020-01-15 | End: 2021-01-29

## 2020-02-01 DIAGNOSIS — E11.9 TYPE 2 DIABETES MELLITUS WITHOUT COMPLICATION (HCC): ICD-10-CM

## 2020-02-03 NOTE — TELEPHONE ENCOUNTER
Call patient's son. He is way overdue on follow-up. I am authorizing 1 month of medication but he will need to be seen prior to next refill. Make sure his follow-up appointment is set when you call him. Once he makes the appointment for follow-up I will authorized the refill.

## 2020-02-04 RX ORDER — GLIMEPIRIDE 2 MG/1
TABLET ORAL
Qty: 45 TABLET | Refills: 0 | Status: SHIPPED | OUTPATIENT
Start: 2020-02-04 | End: 2020-02-05

## 2020-02-05 ENCOUNTER — OFFICE VISIT (OUTPATIENT)
Dept: INTERNAL MEDICINE | Age: 75
End: 2020-02-05

## 2020-02-05 VITALS
SYSTOLIC BLOOD PRESSURE: 140 MMHG | BODY MASS INDEX: 31.4 KG/M2 | HEIGHT: 69 IN | OXYGEN SATURATION: 98 % | WEIGHT: 212 LBS | DIASTOLIC BLOOD PRESSURE: 72 MMHG | TEMPERATURE: 97.1 F | HEART RATE: 72 BPM

## 2020-02-05 DIAGNOSIS — E78.2 MIXED HYPERLIPIDEMIA: Chronic | ICD-10-CM

## 2020-02-05 DIAGNOSIS — R07.89 CHEST DISCOMFORT: ICD-10-CM

## 2020-02-05 DIAGNOSIS — R60.1 GENERALIZED EDEMA: ICD-10-CM

## 2020-02-05 DIAGNOSIS — R06.09 DOE (DYSPNEA ON EXERTION): ICD-10-CM

## 2020-02-05 DIAGNOSIS — E11.65 TYPE 2 DIABETES MELLITUS WITH HYPERGLYCEMIA, WITHOUT LONG-TERM CURRENT USE OF INSULIN (HCC): Primary | Chronic | ICD-10-CM

## 2020-02-05 DIAGNOSIS — I10 ESSENTIAL HYPERTENSION: Chronic | ICD-10-CM

## 2020-02-05 PROCEDURE — 99214 OFFICE O/P EST MOD 30 MIN: CPT | Performed by: INTERNAL MEDICINE

## 2020-02-05 NOTE — ASSESSMENT & PLAN NOTE
· Start Farxiga 5 mg (samples) qAM.   · Discontinue glimepiride.  · Continue metformin and pioglitazone.   · Consider decreasing dose of pioglitazone and will likely titrate Farxiga to 10 mg in 4 weeks.   · He will update me on diabetes in 4 weeks.

## 2020-02-05 NOTE — PROGRESS NOTES
[facility] [place] [facility]  PROSPER JONES M.D.      Meño Georgian / 74 y.o. / male  02/05/2020      CHIEF COMPLAINT     Diabetes (med refill)      ASSESSMENT & PLAN     Problem List Items Addressed This Visit        High    Type 2 diabetes mellitus with hyperglycemia (CMS/HCC) - Primary (Chronic)    Current Assessment & Plan     · Start Farxiga 5 mg (samples) qAM.   · Discontinue glimepiride.  · Continue metformin and pioglitazone.   · Consider decreasing dose of pioglitazone and will likely titrate Farxiga to 10 mg in 4 weeks.   · He will update me on diabetes in 4 weeks.          Relevant Medications    metFORMIN ER (GLUCOPHAGE-XR) 500 MG 24 hr tablet    pioglitazone (ACTOS) 30 MG tablet    dapagliflozin (FARXIGA) 5 MG tablet tablet    Other Relevant Orders    Hemoglobin A1c    Comprehensive Metabolic Panel    Urinalysis With Microscopic If Indicated (No Culture) - Urine, Clean Catch    Hypertension (Chronic)    Current Assessment & Plan     Continue lisinopril 30 mg qd.   · Stopping furosemide 20 mg (starting Farxiga).   Monitor home blood pressure readings.      BP Readings from Last 3 Encounters:   02/05/20 140/72   05/14/19 128/74   03/22/19 156/80             Relevant Medications    lisinopril (PRINIVIL,ZESTRIL) 30 MG tablet       Medium    Hyperlipidemia (Chronic)    Overview     Continue fenofibrate 145 mg          Relevant Medications    fenofibrate (TRICOR) 145 MG tablet    Other Relevant Orders    Lipid Panel With / Chol / HDL Ratio    Generalized edema    Current Assessment & Plan     · Starting Farxiga for diabetes.   · Stop furosemide for now.   Need to decrease sodium intake.            Other Visit Diagnoses     GUILLORY (dyspnea on exertion)        Relevant Orders    Adult Stress Echo W/ Cont or Stress Agent if Necessary Per Protocol    Chest discomfort        Relevant Orders    Adult Stress Echo W/ Cont or Stress Agent if Necessary Per Protocol        Orders Placed This Encounter   Procedures   • Hemoglobin A1c   •  "Comprehensive Metabolic Panel   • Lipid Panel With / Chol / HDL Ratio   • Urinalysis With Microscopic If Indicated (No Culture) - Urine, Clean Catch     New Medications Ordered This Visit   Medications   • dapagliflozin (FARXIGA) 5 MG tablet tablet     Sig: Take 1 tablet by mouth Every Morning Before Breakfast.     Dispense:  30 tablet     Refill:  0       Summary/Discussion:  ·       Next Appointment with me: Visit date not found    Return in about 3 months (around 5/5/2020) for Diabetes, Hypertension.      MEDICATIONS     Current Outpatient Medications   Medication Sig Dispense Refill   • AGAMATRIX PRESTO TEST test strip USE ONE STRIP TO TEST BLOOD SUGAR DAILY 100 each 12   • fenofibrate (TRICOR) 145 MG tablet TAKE ONE TABLET BY MOUTH DAILY 30 tablet 11   • fluticasone (FLONASE) 50 MCG/ACT nasal spray PLACE 2 SPRAYS IN EACH NOSTRIL DAILY 16 g 2   • glimepiride (AMARYL) 2 MG tablet TAKE ONE TABLET BY MOUTH EVERY MORNING  45 tablet 0   • glucose blood (WAVESENSE PRESTO) test strip Check BS once daily 100 each 0   • glucose blood test strip [employer] Blood Glucose Test In Vitro Strip; Patient Sig: [employer] Blood Glucose Test In Vitro Strip TEST BLOOD SUGAR ONCE DAILY; 1; 3; 25-Sep-2015; Active     • [employer] LANCETS THIN 26G misc Use to test blood sugar one time daily. *PT MUST MAKE APPT FOR FUTURE REFILLS* 100 each 0   • [employer] LANCETS THIN 26G misc USE ONE LANCET TO TEST DAILY 100 each 11   • lisinopril (PRINIVIL,ZESTRIL) 30 MG tablet TAKE ONE TABLET BY MOUTH DAILY 30 tablet 5   • metFORMIN ER (GLUCOPHAGE-XR) 500 MG 24 hr tablet TAKE TWO TABLETS BY MOUTH TWICE A  tablet 1   • pioglitazone (ACTOS) 30 MG tablet TAKE ONE TABLET BY MOUTH EVERY MORNING 90 tablet 6   • furosemide (LASIX) 20 MG tablet TAKE ONE TABLET BY MOUTH EVERY MORNING 30 tablet 5     No current facility-administered medications for this visit.           VITAL SIGNS     Visit Vitals  /72   Pulse 72   Temp 97.1 °F (36.2 °C)   Ht 175.3 cm (69.02\") "   Wt 96.2 kg (212 lb)   SpO2 98%   BMI 31.29 kg/m²       BP Readings from Last 3 Encounters:   02/05/20 140/72   05/14/19 128/74   03/22/19 156/80     Wt Readings from Last 3 Encounters:   02/05/20 96.2 kg (212 lb)   05/14/19 95.3 kg (210 lb)   03/22/19 95.3 kg (210 lb)      Body mass index is 31.29 kg/m².      HISTORY OF PRESENT ILLNESS     Chronic type 2 diabetes:  Diabetic complications: none identified. Current therapy: metformin/metformin ER, glimepiride and pioglitazone.  Most recent change to treatment plan: no recent change.  Home blood sugar levels: has frequent symptomatic lows.   Most recent relevant labs:   Lab Results   Component Value Date    HGBA1C 6.90 (H) 05/14/2019    HGBA1C 6.70 (H) 11/06/2018    HGBA1C 7.06 (H) 03/27/2018    CREATININE 1.28 (H) 05/14/2019    LDL 66 05/14/2019    MICROALBUR 10.3 05/14/2019      Hypertension remains stable overall on lisin 30 mg.   Edema : on furosemide 20 mg daily.   Hyperlipidemia on fenofibrate without problems.       Patient Care Team:  Siva Godfrey MD as PCP - General      REVIEW OF SYSTEMS     Review of Systems  Constitutional neg  Resp neg  CV vague chest discomfort with exertion; chronic edema    neg for infections  Neuro neg     PHYSICAL EXAMINATION     Physical Exam  Constitutional  No distress, obese   Cardiovascular Rate  normal . Rhythm: regular . Heart sounds:  Normal. 1+ bilateral lower extremities edema.   Pulmonary/Chest: Effort normal and breath sounds normal.    Psychiatric  Alert. Judgment intact. Thought content normal. Mood normal    REVIEWED DATA     Labs:     Lab Results   Component Value Date     05/14/2019    K 4.8 05/14/2019    CALCIUM 10.3 05/14/2019    AST 27 05/14/2019    ALT 16 05/14/2019    BUN 25 (H) 05/14/2019    CREATININE 1.28 (H) 05/14/2019    CREATININE 1.02 03/22/2019    CREATININE 0.98 12/03/2018    EGFRIFNONA 55 (L) 05/14/2019    EGFRIFAFRI 67 05/14/2019       Lab Results   Component Value Date    HGBA1C 6.90 (H)  05/14/2019    HGBA1C 6.70 (H) 11/06/2018    HGBA1C 7.06 (H) 03/27/2018    GLU 73 05/14/2019    GLU 91 03/22/2019     (H) 12/03/2018    MICROALBUR 10.3 05/14/2019       Lab Results   Component Value Date    LDL 66 05/14/2019    LDL 81 03/27/2018    LDL 94 02/13/2017    HDL 48 05/14/2019    HDL 53 03/27/2018    HDL 49 02/13/2017    TRIG 110 05/14/2019    TRIG 89 03/27/2018    TRIG 57 02/13/2017    CHOLHDLRATIO 2.83 05/14/2019    CHOLHDLRATIO 2.87 03/27/2018    CHOLHDLRATIO 3.14 02/13/2017       Lab Results   Component Value Date    TSH 0.859 03/22/2019    FREET4 1.65 03/22/2019       Lab Results   Component Value Date    WBC 5.38 12/03/2018    HGB 13.5 (L) 12/03/2018    HGB 13.0 (L) 11/06/2018     12/03/2018       Lab Results   Component Value Date    PROTEIN Negative 05/14/2019    GLUCOSEU Negative 05/14/2019    BLOODU Negative 05/14/2019    NITRITEU Negative 05/14/2019    LEUKOCYTESUR Negative 05/14/2019       Imaging:         Medical Tests:   Regadenoson Stress Test With Myocardial Perfusion SPECT   4/5/18  · Diaphragmatic attenuation artifact is present.  · Left ventricular ejection fraction is normal (Calculated EF = 64%).  · Myocardial perfusion imaging indicates a normal myocardial perfusion study with no evidence of ischemia.  · Impressions are consistent with a low risk study.    Summary of old records / correspondence / consultant report:         Request outside records:         ALLERGIES  No Known Allergies     PFSH:     The following portions of the patient's history were reviewed and updated as appropriate: Allergies / Current Medications / Past Medical History / Surgical History / Social History / Family History    PROBLEM LIST   Patient Active Problem List   Diagnosis   • Type 2 diabetes mellitus with hyperglycemia (CMS/[facility])   • Atopic rhinitis   • Hyperlipidemia   • Hypertension   • Lumbago of lumbar region with sciatica   • Generalized edema       PAST MEDICAL HISTORY  Past Medical  History:   Diagnosis Date   • Diabetes mellitus (CMS/HCC)    • Hyperlipidemia    • Hypertension        SURGICAL HISTORY  History reviewed. No pertinent surgical history.    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 No pertinent family history.      **Dragon Disclaimer:   Much of this encounter note is an electronic transcription/translation of spoken language to printed text. The electronic translation of spoken language may permit erroneous, or at times, nonsensical words or phrases to be inadvertently transcribed. Although I have reviewed the note for such errors, some may still exist.       Template created by Sugey Godfrey MD

## 2020-02-05 NOTE — PATIENT INSTRUCTIONS
** IMPORTANT MESSAGE FROM DR. JONES **    In our office, your satisfaction is VERY important to us.     You may receive a survey from [facility] by mail or E-mail for you to provide feedback about your visit. This information is invaluable for me to know what we can do to improve our services.     I ask that you please take a few minutes to complete the survey and let us know how we are doing in serving your needs. (You may receive the survey more than once for multiple visits)    Thank You !    Dr. Jones & Staff    _________________________________________________________________________________________________________________________      ** ADDITIONAL INSTRUCTION / REMINDERS FROM DR. JONES **

## 2020-02-05 NOTE — ASSESSMENT & PLAN NOTE
Continue lisinopril 30 mg qd.   · Stopping furosemide 20 mg (starting Farxiga).   Monitor home blood pressure readings.      BP Readings from Last 3 Encounters:   02/05/20 140/72   05/14/19 128/74   03/22/19 156/80

## 2020-02-06 LAB
ALBUMIN SERPL-MCNC: 4.6 G/DL (ref 3.7–4.7)
ALBUMIN/GLOB SERPL: 1.8 {RATIO} (ref 1.2–2.2)
ALP SERPL-CCNC: 47 IU/L (ref 39–117)
ALT SERPL-CCNC: 12 IU/L (ref 0–44)
APPEARANCE UR: CLEAR
AST SERPL-CCNC: 24 IU/L (ref 0–40)
BILIRUB SERPL-MCNC: 0.3 MG/DL (ref 0–1.2)
BILIRUB UR QL STRIP: NEGATIVE
BUN SERPL-MCNC: 20 MG/DL (ref 8–27)
BUN/CREAT SERPL: 17 (ref 10–24)
CALCIUM SERPL-MCNC: 9.4 MG/DL (ref 8.6–10.2)
CHLORIDE SERPL-SCNC: 99 MMOL/L (ref 96–106)
CHOLEST SERPL-MCNC: 141 MG/DL (ref 100–199)
CHOLEST/HDLC SERPL: 2.8 RATIO (ref 0–5)
CO2 SERPL-SCNC: 22 MMOL/L (ref 20–29)
COLOR UR: YELLOW
CREAT SERPL-MCNC: 1.2 MG/DL (ref 0.76–1.27)
GLOBULIN SER CALC-MCNC: 2.6 G/DL (ref 1.5–4.5)
GLUCOSE SERPL-MCNC: NORMAL MG/DL
GLUCOSE UR QL: NEGATIVE
HBA1C MFR BLD: 6.7 % (ref 4.8–5.6)
HDLC SERPL-MCNC: 50 MG/DL
HGB UR QL STRIP: NEGATIVE
KETONES UR QL STRIP: NEGATIVE
LDLC SERPL CALC-MCNC: 67 MG/DL (ref 0–99)
LEUKOCYTE ESTERASE UR QL STRIP: NEGATIVE
MICRO URNS: NORMAL
NITRITE UR QL STRIP: NEGATIVE
PH UR STRIP: 5.5 [PH] (ref 5–7.5)
POTASSIUM SERPL-SCNC: NORMAL MMOL/L
PROT SERPL-MCNC: 7.2 G/DL (ref 6–8.5)
PROT UR QL STRIP: NEGATIVE
SODIUM SERPL-SCNC: 142 MMOL/L (ref 134–144)
SP GR UR: 1.01 (ref 1–1.03)
TRIGL SERPL-MCNC: 121 MG/DL (ref 0–149)
UROBILINOGEN UR STRIP-MCNC: 0.2 MG/DL (ref 0.2–1)
VLDLC SERPL CALC-MCNC: 24 MG/DL (ref 5–40)

## 2020-02-27 DIAGNOSIS — E11.65 TYPE 2 DIABETES MELLITUS WITH HYPERGLYCEMIA, WITHOUT LONG-TERM CURRENT USE OF INSULIN (HCC): Chronic | ICD-10-CM

## 2020-02-27 NOTE — TELEPHONE ENCOUNTER
Contacted pt son , he need rx to be sent for farxiga  rx attached   
Add 06778 Cpt? (Important Note: In 2017 The Use Of 22861 Is Being Tracked By Cms To Determine Future Global Period Reimbursement For Global Periods): yes
Detail Level: Detailed
Body Location Override (Optional - Billing Will Still Be Based On Selected Body Map Location If Applicable): right anterior lateral proximal thigh

## 2020-03-04 ENCOUNTER — HOSPITAL ENCOUNTER (OUTPATIENT)
Dept: CARDIOLOGY | Facility: HOSPITAL | Age: 75
Discharge: HOME | End: 2020-03-04
Admitting: INTERNAL MEDICINE

## 2020-03-04 VITALS
DIASTOLIC BLOOD PRESSURE: 78 MMHG | HEIGHT: 69 IN | SYSTOLIC BLOOD PRESSURE: 126 MMHG | WEIGHT: 212 LBS | BODY MASS INDEX: 31.4 KG/M2 | HEART RATE: 62 BPM

## 2020-03-04 DIAGNOSIS — R06.09 DOE (DYSPNEA ON EXERTION): ICD-10-CM

## 2020-03-04 DIAGNOSIS — R07.89 CHEST DISCOMFORT: ICD-10-CM

## 2020-03-04 LAB
AORTIC ROOT ANNULUS: 2.3 CM
ASCENDING AORTA: 3.9 CM
BH CV ECHO MEAS - ACS: 2.4 CM
BH CV ECHO MEAS - AO MAX PG: 9.1 MMHG
BH CV ECHO MEAS - AO V2 MAX: 150.8 CM/SEC
BH CV ECHO MEAS - ASC AORTA: 3.9 CM
BH CV ECHO MEAS - BSA(HAYCOCK): 2.2 M^2
BH CV ECHO MEAS - BSA: 2.1 M^2
BH CV ECHO MEAS - BZI_BMI: 31.3 KILOGRAMS/M^2
BH CV ECHO MEAS - BZI_METRIC_HEIGHT: 175.3 CM
BH CV ECHO MEAS - BZI_METRIC_WEIGHT: 96.2 KG
BH CV ECHO MEAS - EDV(MOD-SP4): 88 ML
BH CV ECHO MEAS - EDV(TEICH): 165.5 ML
BH CV ECHO MEAS - EF(CUBED): 84.4 %
BH CV ECHO MEAS - EF(MOD-BP): 56 %
BH CV ECHO MEAS - EF(MOD-SP4): 71.6 %
BH CV ECHO MEAS - EF(TEICH): 76.9 %
BH CV ECHO MEAS - ESV(MOD-SP4): 25 ML
BH CV ECHO MEAS - ESV(TEICH): 38.3 ML
BH CV ECHO MEAS - FS: 46.2 %
BH CV ECHO MEAS - IVS/LVPW: 1
BH CV ECHO MEAS - IVSD: 1.1 CM
BH CV ECHO MEAS - LAT PEAK E' VEL: 7 CM/SEC
BH CV ECHO MEAS - LV DIASTOLIC VOL/BSA (35-75): 41.6 ML/M^2
BH CV ECHO MEAS - LV MASS(C)D: 273.8 GRAMS
BH CV ECHO MEAS - LV MASS(C)DI: 129.3 GRAMS/M^2
BH CV ECHO MEAS - LV SYSTOLIC VOL/BSA (12-30): 11.8 ML/M^2
BH CV ECHO MEAS - LVIDD: 5.8 CM
BH CV ECHO MEAS - LVIDS: 3.1 CM
BH CV ECHO MEAS - LVLD AP4: 8.7 CM
BH CV ECHO MEAS - LVLS AP4: 8 CM
BH CV ECHO MEAS - LVPWD: 1.1 CM
BH CV ECHO MEAS - MED PEAK E' VEL: 6 CM/SEC
BH CV ECHO MEAS - MR MAX PG: 99.7 MMHG
BH CV ECHO MEAS - MR MAX VEL: 499.1 CM/SEC
BH CV ECHO MEAS - MV A DUR: 0.13 SEC
BH CV ECHO MEAS - MV A MAX VEL: 92.2 CM/SEC
BH CV ECHO MEAS - MV DEC SLOPE: 433.3 CM/SEC^2
BH CV ECHO MEAS - MV DEC TIME: 0.23 SEC
BH CV ECHO MEAS - MV E MAX VEL: 101 CM/SEC
BH CV ECHO MEAS - MV E/A: 1.1
BH CV ECHO MEAS - MV P1/2T MAX VEL: 100.4 CM/SEC
BH CV ECHO MEAS - MV P1/2T: 67.9 MSEC
BH CV ECHO MEAS - MVA P1/2T LCG: 2.2 CM^2
BH CV ECHO MEAS - MVA(P1/2T): 3.2 CM^2
BH CV ECHO MEAS - PULM A REVS DUR: 0.13 SEC
BH CV ECHO MEAS - PULM A REVS VEL: 26.2 CM/SEC
BH CV ECHO MEAS - PULM DIAS VEL: 54.7 CM/SEC
BH CV ECHO MEAS - PULM S/D: 1.2
BH CV ECHO MEAS - PULM SYS VEL: 67.1 CM/SEC
BH CV ECHO MEAS - SI(CUBED): 77.2 ML/M^2
BH CV ECHO MEAS - SI(MOD-SP4): 29.8 ML/M^2
BH CV ECHO MEAS - SI(TEICH): 60.1 ML/M^2
BH CV ECHO MEAS - SV(CUBED): 163.4 ML
BH CV ECHO MEAS - SV(MOD-SP4): 63 ML
BH CV ECHO MEAS - SV(TEICH): 127.3 ML
BH CV ECHO MEAS - TAPSE (>1.6): 3.3 CM2
BH CV ECHO MEASUREMENTS AVERAGE E/E' RATIO: 15.54
BH CV STRESS BP STAGE 1: NORMAL
BH CV STRESS BP STAGE 2: NORMAL
BH CV STRESS DURATION MIN STAGE 1: 3
BH CV STRESS DURATION MIN STAGE 2: 3
BH CV STRESS DURATION SEC STAGE 1: 0
BH CV STRESS DURATION SEC STAGE 2: 0
BH CV STRESS ECHO POST STRESS EJECTION FRACTION EF: 72 %
BH CV STRESS GRADE STAGE 1: 10
BH CV STRESS GRADE STAGE 2: 12
BH CV STRESS HR STAGE 1: 115
BH CV STRESS HR STAGE 2: 138
BH CV STRESS METS STAGE 1: 5
BH CV STRESS METS STAGE 2: 7.5
BH CV STRESS PROTOCOL 1: NORMAL
BH CV STRESS SPEED STAGE 1: 1.7
BH CV STRESS SPEED STAGE 2: 2.5
BH CV STRESS STAGE 1: 1
BH CV STRESS STAGE 2: 2
BH CV XLRA - RV BASE: 3 CM
BH CV XLRA - RV LENGTH: 7.2 CM
BH CV XLRA - RV MID: 2.6 CM
BH CV XLRA - TDI S': 12 CM/SEC
LEFT ATRIUM VOLUME INDEX: 18 ML/M2
MAXIMAL PREDICTED HEART RATE: 148 BPM
PERCENT MAX PREDICTED HR: 93.24 %
SINUS: 3.4 CM
STJ: 3.4 CM
STRESS BASELINE BP: NORMAL MMHG
STRESS BASELINE HR: 62 BPM
STRESS PERCENT HR: 110 %
STRESS POST ESTIMATED WORKLOAD: 7 METS
STRESS POST EXERCISE DUR MIN: 6 MIN
STRESS POST EXERCISE DUR SEC: 0 SEC
STRESS POST PEAK BP: NORMAL MMHG
STRESS POST PEAK HR: 138 BPM
STRESS TARGET HR: 126 BPM

## 2020-03-04 PROCEDURE — 93325 DOPPLER ECHO COLOR FLOW MAPG: CPT | Performed by: INTERNAL MEDICINE

## 2020-03-04 PROCEDURE — 93320 DOPPLER ECHO COMPLETE: CPT | Performed by: INTERNAL MEDICINE

## 2020-03-04 PROCEDURE — 93018 CV STRESS TEST I&R ONLY: CPT | Performed by: INTERNAL MEDICINE

## 2020-03-04 PROCEDURE — 93016 CV STRESS TEST SUPVJ ONLY: CPT | Performed by: INTERNAL MEDICINE

## 2020-03-04 PROCEDURE — 93350 STRESS TTE ONLY: CPT | Performed by: INTERNAL MEDICINE

## 2020-03-04 PROCEDURE — 93350 STRESS TTE ONLY: CPT

## 2020-03-04 PROCEDURE — 93320 DOPPLER ECHO COMPLETE: CPT

## 2020-03-04 PROCEDURE — 93325 DOPPLER ECHO COLOR FLOW MAPG: CPT

## 2020-03-04 PROCEDURE — 25010000002 PERFLUTREN (DEFINITY) 8.476 MG IN SODIUM CHLORIDE 0.9 % 10 ML INJECTION: Performed by: INTERNAL MEDICINE

## 2020-03-04 PROCEDURE — 93352 ADMIN ECG CONTRAST AGENT: CPT | Performed by: INTERNAL MEDICINE

## 2020-03-04 PROCEDURE — 93017 CV STRESS TEST TRACING ONLY: CPT

## 2020-03-04 RX ADMIN — PERFLUTREN 3 ML: 6.52 INJECTION, SUSPENSION INTRAVENOUS at 09:54

## 2020-03-04 NOTE — PROGRESS NOTES
Send result letter to patient.    Bang, these are your recent lab results:    Stress echocardiogram of the heart was normal.     Please call my office if you have any questions. Otherwise, we can discuss the results in further detail on your next visit.

## 2020-05-02 DIAGNOSIS — E11.9 TYPE 2 DIABETES MELLITUS WITHOUT COMPLICATION (HCC): ICD-10-CM

## 2020-05-02 DIAGNOSIS — I10 ESSENTIAL HYPERTENSION: Chronic | ICD-10-CM

## 2020-05-04 RX ORDER — LISINOPRIL 30 MG/1
TABLET ORAL
Qty: 90 TABLET | Refills: 1 | Status: SHIPPED | OUTPATIENT
Start: 2020-05-04 | End: 2020-11-02

## 2020-05-06 ENCOUNTER — OFFICE VISIT (OUTPATIENT)
Dept: INTERNAL MEDICINE | Age: 75
End: 2020-05-06

## 2020-05-06 VITALS
TEMPERATURE: 97.2 F | WEIGHT: 208 LBS | HEART RATE: 64 BPM | HEIGHT: 69 IN | BODY MASS INDEX: 30.81 KG/M2 | DIASTOLIC BLOOD PRESSURE: 80 MMHG | SYSTOLIC BLOOD PRESSURE: 120 MMHG | OXYGEN SATURATION: 96 %

## 2020-05-06 DIAGNOSIS — E78.2 MIXED HYPERLIPIDEMIA: Chronic | ICD-10-CM

## 2020-05-06 DIAGNOSIS — J30.9 ALLERGIC RHINITIS: ICD-10-CM

## 2020-05-06 DIAGNOSIS — R60.1 GENERALIZED EDEMA: Chronic | ICD-10-CM

## 2020-05-06 DIAGNOSIS — E11.65 TYPE 2 DIABETES MELLITUS WITH HYPERGLYCEMIA, WITHOUT LONG-TERM CURRENT USE OF INSULIN (HCC): Primary | Chronic | ICD-10-CM

## 2020-05-06 DIAGNOSIS — I10 ESSENTIAL HYPERTENSION: Chronic | ICD-10-CM

## 2020-05-06 PROCEDURE — 99214 OFFICE O/P EST MOD 30 MIN: CPT | Performed by: INTERNAL MEDICINE

## 2020-05-06 PROCEDURE — 83036 HEMOGLOBIN GLYCOSYLATED A1C: CPT | Performed by: INTERNAL MEDICINE

## 2020-05-06 RX ORDER — FLUTICASONE PROPIONATE 50 MCG
2 SPRAY, SUSPENSION (ML) NASAL DAILY
Qty: 16 G | Refills: 11 | Status: SHIPPED | OUTPATIENT
Start: 2020-05-06 | End: 2021-03-24 | Stop reason: SDUPTHER

## 2020-05-06 RX ORDER — FUROSEMIDE 20 MG/1
TABLET ORAL
COMMUNITY
Start: 2020-02-16 | End: 2020-05-06

## 2020-05-06 NOTE — ASSESSMENT & PLAN NOTE
Stable off furosemide with Farxiga which will be increased to 10 mg qd today.   Continue to minimize sodium intake.

## 2020-05-06 NOTE — PROGRESS NOTES
[facility] [place] [facility]  PROSPER JONES M.D.      Meño Ukrainian / 75 y.o. / male  2020      CHIEF COMPLAINT     Diabetes      ASSESSMENT & PLAN     Problem List Items Addressed This Visit        High    Type 2 diabetes mellitus with hyperglycemia (CMS/HCC) - Primary (Chronic)    Current Assessment & Plan     POCT A1c 6.8 today.   Lab Results   Component Value Date    HGBA1C 6.7 (H) 2020    HGBA1C 6.90 (H) 2019    HGBA1C 6.70 (H) 2018      Increase Farxiga to 10 mg qd (samples provided)  Continue metformin and pioglitazone.  Follow-up 2020 (same appointment date as spouse)         Relevant Medications    metFORMIN ER (GLUCOPHAGE-XR) 500 MG 24 hr tablet    pioglitazone (ACTOS) 30 MG tablet    dapagliflozin (FARXIGA) 5 MG tablet tablet    Other Relevant Orders    POC Glycosylated Hemoglobin (Hb A1C)    Hypertension (Chronic)    Current Assessment & Plan     Continue lisinopril 30 mg qd.   Increasing Farxiga to 10 mg qd today.          Relevant Medications    lisinopril (PRINIVIL,ZESTRIL) 30 MG tablet       Medium    Hyperlipidemia (Chronic)    Overview     Continue fenofibrate 145 mg          Relevant Medications    fenofibrate (TRICOR) 145 MG tablet    Generalized edema (Chronic)    Current Assessment & Plan     Stable off furosemide with Farxiga which will be increased to 10 mg qd today.   Continue to minimize sodium intake.             Unprioritized    Allergic rhinitis (Chronic)    Current Assessment & Plan     Refilled fluticasone nasal spray.          Relevant Medications    fluticasone (FLONASE) 50 MCG/ACT nasal spray        Orders Placed This Encounter   Procedures   • POC Glycosylated Hemoglobin (Hb A1C)     New Medications Ordered This Visit   Medications   • fluticasone (FLONASE) 50 MCG/ACT nasal spray     Si sprays by Each Nare route Daily.     Dispense:  16 g     Refill:  11       Summary/Discussion:      Next Appointment with me: 2020 (same day as spouse's appointment)    Return  "in 3 months (on 8/4/2020) for Diabetes.      MEDICATIONS     Current Outpatient Medications   Medication Sig Dispense Refill   • AGAMATRIX PRESTO TEST test strip USE ONE STRIP TO TEST BLOOD SUGAR DAILY 100 each 12   • dapagliflozin (FARXIGA) 5 MG tablet tablet Take 1 tablet by mouth Every Morning Before Breakfast. 30 tablet 3   • fenofibrate (TRICOR) 145 MG tablet TAKE ONE TABLET BY MOUTH DAILY 30 tablet 11   • fluticasone (FLONASE) 50 MCG/ACT nasal spray 2 sprays by Each Nare route Daily. 16 g 11   • glucose blood (WAVESENSE PRESTO) test strip Check BS once daily 100 each 0   • glucose blood test strip [employer] Blood Glucose Test In Vitro Strip; Patient Sig: [employer] Blood Glucose Test In Vitro Strip TEST BLOOD SUGAR ONCE DAILY; 1; 3; 25-Sep-2015; Active     • [employer] LANCETS THIN 26G misc Use to test blood sugar one time daily. *PT MUST MAKE APPT FOR FUTURE REFILLS* 100 each 0   • [employer] LANCETS THIN 26G misc USE ONE LANCET TO TEST DAILY 100 each 11   • lisinopril (PRINIVIL,ZESTRIL) 30 MG tablet TAKE ONE TABLET BY MOUTH DAILY 90 tablet 1   • metFORMIN ER (GLUCOPHAGE-XR) 500 MG 24 hr tablet TAKE TWO TABLETS BY MOUTH TWICE A  tablet 1   • pioglitazone (ACTOS) 30 MG tablet TAKE ONE TABLET BY MOUTH EVERY MORNING 90 tablet 6     No current facility-administered medications for this visit.           VITAL SIGNS     Visit Vitals  /80   Pulse 64   Temp 97.2 °F (36.2 °C) (Temporal)   Ht 175.3 cm (69.02\")   Wt 94.3 kg (208 lb)   SpO2 96%   BMI 30.70 kg/m²       BP Readings from Last 3 Encounters:   05/06/20 120/80   03/04/20 126/78   02/05/20 140/72     Wt Readings from Last 3 Encounters:   05/06/20 94.3 kg (208 lb)   03/04/20 96.2 kg (212 lb)   02/05/20 96.2 kg (212 lb)      Body mass index is 30.7 kg/m².      HISTORY OF PRESENT ILLNESS     DM 2: previously stopped glimepiride and started Farxiga 5 mg without significant problems. FBS are ranging mostly < 110 but at time goes up to 140 based on dietary fluctuation. " On metformin and pioglitazone as well.     Hypertension and edema are stable after stopping furosemide and starting Farxiga.     Hyperlipidemia is stable on fenofibrate.       Patient Care Team:  Siva Godfrey MD as PCP - General      REVIEW OF SYSTEMS     Review of Systems  Constitutional neg x mild wt loss  Nasal congestion/rhinitis   Resp neg  CV neg    neg      PHYSICAL EXAMINATION     Physical Exam  Constitutional  No distress  Cardiovascular trace bilateral lower extremities edema   Psychiatric  Alert. Judgment intact. Thought content normal. Mood normal      REVIEWED DATA     Labs:     A1c level today 6.8    Lab Results   Component Value Date     02/05/2020    K CANCELED 02/05/2020    CALCIUM 9.4 02/05/2020    AST 24 02/05/2020    ALT 12 02/05/2020    BUN 20 02/05/2020    CREATININE 1.20 02/05/2020    CREATININE 1.28 (H) 05/14/2019    CREATININE 1.02 03/22/2019    EGFRIFNONA 60 02/05/2020    EGFRIFAFRI 69 02/05/2020       Lab Results   Component Value Date    HGBA1C 6.7 (H) 02/05/2020    HGBA1C 6.90 (H) 05/14/2019    HGBA1C 6.70 (H) 11/06/2018    GLU CANCELED 02/05/2020    GLU 73 05/14/2019    GLU 91 03/22/2019    MICROALBUR 10.3 05/14/2019       Lab Results   Component Value Date    LDL 67 02/05/2020    LDL 66 05/14/2019    LDL 81 03/27/2018    HDL 50 02/05/2020    HDL 48 05/14/2019    HDL 53 03/27/2018    TRIG 121 02/05/2020    TRIG 110 05/14/2019    TRIG 89 03/27/2018    CHOLHDLRATIO 2.8 02/05/2020    CHOLHDLRATIO 2.83 05/14/2019    CHOLHDLRATIO 2.87 03/27/2018       Lab Results   Component Value Date    TSH 0.859 03/22/2019    FREET4 1.65 03/22/2019       Lab Results   Component Value Date    WBC 5.38 12/03/2018    HGB 13.5 (L) 12/03/2018    HGB 13.0 (L) 11/06/2018     12/03/2018       Lab Results   Component Value Date    PROTEIN Negative 02/05/2020    GLUCOSEU Negative 02/05/2020    BLOODU Negative 02/05/2020    NITRITEU Negative 02/05/2020    LEUKOCYTESUR Negative 02/05/2020        Imaging:         Medical Tests:         Summary of old records / correspondence / consultant report:         Request outside records:         ALLERGIES  No Known Allergies     PFSH:     The following portions of the patient's history were reviewed and updated as appropriate: Allergies / Current Medications / Past Medical History / Surgical History / Social History / Family History    PROBLEM LIST   Patient Active Problem List   Diagnosis   • Type 2 diabetes mellitus with hyperglycemia (CMS/HCC)   • Allergic rhinitis   • Hyperlipidemia   • Hypertension   • Lumbago of lumbar region with sciatica   • Generalized edema       PAST MEDICAL HISTORY  Past Medical History:   Diagnosis Date   • Diabetes mellitus (CMS/HCC)    • Hyperlipidemia    • Hypertension        SURGICAL HISTORY  No past surgical history on file.    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      **Dragon Disclaimer:   Much of this encounter note is an electronic transcription/translation of spoken language to printed text. The electronic translation of spoken language may permit erroneous, or at times, nonsensical words or phrases to be inadvertently transcribed. Although I have reviewed the note for such errors, some may still exist.     Template created by uSgey Godfrey MD

## 2020-05-07 LAB — HBA1C MFR BLD: 6.8 %

## 2020-05-21 DIAGNOSIS — I10 ESSENTIAL HYPERTENSION: Chronic | ICD-10-CM

## 2020-05-21 DIAGNOSIS — R60.1 GENERALIZED EDEMA: ICD-10-CM

## 2020-05-21 DIAGNOSIS — E11.9 TYPE 2 DIABETES MELLITUS WITHOUT COMPLICATION (HCC): ICD-10-CM

## 2020-05-21 RX ORDER — FUROSEMIDE 20 MG/1
TABLET ORAL
Qty: 90 TABLET | Refills: 4 | OUTPATIENT
Start: 2020-05-21

## 2020-05-21 RX ORDER — BLOOD SUGAR DIAGNOSTIC
STRIP MISCELLANEOUS
Qty: 100 EACH | Refills: 11 | Status: SHIPPED | OUTPATIENT
Start: 2020-05-21

## 2020-06-04 DIAGNOSIS — R60.1 GENERALIZED EDEMA: ICD-10-CM

## 2020-06-04 DIAGNOSIS — I10 ESSENTIAL HYPERTENSION: Chronic | ICD-10-CM

## 2020-06-04 RX ORDER — FUROSEMIDE 20 MG/1
TABLET ORAL
Qty: 90 TABLET | Refills: 4 | OUTPATIENT
Start: 2020-06-04

## 2020-06-05 DIAGNOSIS — I10 ESSENTIAL HYPERTENSION: Chronic | ICD-10-CM

## 2020-06-05 DIAGNOSIS — R60.1 GENERALIZED EDEMA: ICD-10-CM

## 2020-06-05 RX ORDER — FUROSEMIDE 20 MG/1
TABLET ORAL
Qty: 90 TABLET | Refills: 4 | OUTPATIENT
Start: 2020-06-05

## 2020-06-11 DIAGNOSIS — I10 ESSENTIAL HYPERTENSION: Chronic | ICD-10-CM

## 2020-06-11 DIAGNOSIS — E11.65 TYPE 2 DIABETES MELLITUS WITH HYPERGLYCEMIA, WITHOUT LONG-TERM CURRENT USE OF INSULIN (HCC): Chronic | ICD-10-CM

## 2020-06-11 DIAGNOSIS — E11.9 TYPE 2 DIABETES MELLITUS WITHOUT COMPLICATION (HCC): ICD-10-CM

## 2020-06-11 DIAGNOSIS — R60.1 GENERALIZED EDEMA: ICD-10-CM

## 2020-06-12 RX ORDER — FUROSEMIDE 20 MG/1
TABLET ORAL
Qty: 90 TABLET | Refills: 4 | OUTPATIENT
Start: 2020-06-12

## 2020-06-12 RX ORDER — DAPAGLIFLOZIN 5 MG/1
TABLET, FILM COATED ORAL
Qty: 90 TABLET | Refills: 2 | OUTPATIENT
Start: 2020-06-12

## 2020-06-12 RX ORDER — METFORMIN HYDROCHLORIDE 500 MG/1
TABLET, EXTENDED RELEASE ORAL
Qty: 360 TABLET | Refills: 0 | OUTPATIENT
Start: 2020-06-12

## 2020-07-03 DIAGNOSIS — E11.9 TYPE 2 DIABETES MELLITUS WITHOUT COMPLICATION (HCC): ICD-10-CM

## 2020-07-06 RX ORDER — METFORMIN HYDROCHLORIDE 500 MG/1
TABLET, EXTENDED RELEASE ORAL
Qty: 360 TABLET | Refills: 1 | Status: SHIPPED | OUTPATIENT
Start: 2020-07-06 | End: 2020-11-02 | Stop reason: SDUPTHER

## 2020-07-09 ENCOUNTER — TELEPHONE (OUTPATIENT)
Dept: INTERNAL MEDICINE | Age: 75
End: 2020-07-09

## 2020-07-09 DIAGNOSIS — E11.65 TYPE 2 DIABETES MELLITUS WITH HYPERGLYCEMIA, WITHOUT LONG-TERM CURRENT USE OF INSULIN (HCC): Chronic | ICD-10-CM

## 2020-07-09 NOTE — TELEPHONE ENCOUNTER
SON NEAL CALLED IN TO REQUEST A REFILL Dapagliflozin Propanediol (Farxiga) 10 MG tablet      SENT TO [place] [id number] [facility] [address] - [phone]  - [phone] [place]  [phone]        NEAL  CALL BACK [phone].

## 2020-07-14 DIAGNOSIS — E11.65 TYPE 2 DIABETES MELLITUS WITH HYPERGLYCEMIA, WITHOUT LONG-TERM CURRENT USE OF INSULIN (HCC): Chronic | ICD-10-CM

## 2020-08-04 ENCOUNTER — OFFICE VISIT (OUTPATIENT)
Dept: INTERNAL MEDICINE | Age: 75
End: 2020-08-04

## 2020-08-04 ENCOUNTER — LAB (OUTPATIENT)
Dept: LAB | Facility: HOSPITAL | Age: 75
End: 2020-08-04

## 2020-08-04 ENCOUNTER — RESULTS ENCOUNTER (OUTPATIENT)
Dept: INTERNAL MEDICINE | Age: 75
End: 2020-08-04

## 2020-08-04 VITALS
HEART RATE: 60 BPM | DIASTOLIC BLOOD PRESSURE: 70 MMHG | SYSTOLIC BLOOD PRESSURE: 146 MMHG | OXYGEN SATURATION: 98 % | WEIGHT: 207 LBS | HEIGHT: 69 IN | TEMPERATURE: 96 F | BODY MASS INDEX: 30.66 KG/M2

## 2020-08-04 DIAGNOSIS — I10 ESSENTIAL HYPERTENSION: Chronic | ICD-10-CM

## 2020-08-04 DIAGNOSIS — R60.1 GENERALIZED EDEMA: Chronic | ICD-10-CM

## 2020-08-04 DIAGNOSIS — Z12.11 SCREENING FOR COLON CANCER: ICD-10-CM

## 2020-08-04 DIAGNOSIS — E78.2 MIXED HYPERLIPIDEMIA: Chronic | ICD-10-CM

## 2020-08-04 DIAGNOSIS — E11.65 TYPE 2 DIABETES MELLITUS WITH HYPERGLYCEMIA, WITHOUT LONG-TERM CURRENT USE OF INSULIN (HCC): Primary | Chronic | ICD-10-CM

## 2020-08-04 LAB
ALBUMIN UR-MCNC: 1.4 MG/DL
ANION GAP SERPL CALCULATED.3IONS-SCNC: 7.7 MMOL/L (ref 5–15)
BILIRUB UR QL STRIP: NEGATIVE
BUN SERPL-MCNC: 23 MG/DL (ref 8–23)
BUN/CREAT SERPL: 23.5 (ref 7–25)
CALCIUM SPEC-SCNC: 9.5 MG/DL (ref 8.6–10.5)
CHLORIDE SERPL-SCNC: 104 MMOL/L (ref 98–107)
CLARITY UR: CLEAR
CO2 SERPL-SCNC: 25.3 MMOL/L (ref 22–29)
COLOR UR: YELLOW
CREAT SERPL-MCNC: 0.98 MG/DL (ref 0.76–1.27)
CREAT UR-MCNC: 55.1 MG/DL
GFR SERPL CREATININE-BSD FRML MDRD: 75 ML/MIN/1.73
GLUCOSE SERPL-MCNC: 113 MG/DL (ref 65–99)
GLUCOSE UR STRIP-MCNC: ABNORMAL MG/DL
HBA1C MFR BLD: 6.89 % (ref 4.8–5.6)
HGB UR QL STRIP.AUTO: NEGATIVE
KETONES UR QL STRIP: NEGATIVE
LEUKOCYTE ESTERASE UR QL STRIP.AUTO: NEGATIVE
MICROALBUMIN/CREAT UR: 25.4 MG/G
NITRITE UR QL STRIP: NEGATIVE
PH UR STRIP.AUTO: 7 [PH] (ref 5–8)
POTASSIUM SERPL-SCNC: 4 MMOL/L (ref 3.5–5.2)
PROT UR QL STRIP: NEGATIVE
SODIUM SERPL-SCNC: 137 MMOL/L (ref 136–145)
SP GR UR STRIP: 1.03 (ref 1–1.03)
UROBILINOGEN UR QL STRIP: ABNORMAL

## 2020-08-04 PROCEDURE — 36415 COLL VENOUS BLD VENIPUNCTURE: CPT | Performed by: INTERNAL MEDICINE

## 2020-08-04 PROCEDURE — 82570 ASSAY OF URINE CREATININE: CPT | Performed by: INTERNAL MEDICINE

## 2020-08-04 PROCEDURE — 80048 BASIC METABOLIC PNL TOTAL CA: CPT | Performed by: INTERNAL MEDICINE

## 2020-08-04 PROCEDURE — 81003 URINALYSIS AUTO W/O SCOPE: CPT | Performed by: INTERNAL MEDICINE

## 2020-08-04 PROCEDURE — 83036 HEMOGLOBIN GLYCOSYLATED A1C: CPT | Performed by: INTERNAL MEDICINE

## 2020-08-04 PROCEDURE — 99214 OFFICE O/P EST MOD 30 MIN: CPT | Performed by: INTERNAL MEDICINE

## 2020-08-04 PROCEDURE — 82043 UR ALBUMIN QUANTITATIVE: CPT | Performed by: INTERNAL MEDICINE

## 2020-08-04 RX ORDER — HYDROCHLOROTHIAZIDE 12.5 MG/1
12.5 TABLET ORAL DAILY
Qty: 30 TABLET | Refills: 3 | Status: SHIPPED | OUTPATIENT
Start: 2020-08-04 | End: 2020-10-30

## 2020-08-04 NOTE — ASSESSMENT & PLAN NOTE
Start HCTZ for higher blood pressure and edema.   Recommended compression stockings.   Decrease sodium.

## 2020-08-04 NOTE — ASSESSMENT & PLAN NOTE
Check A1c.  Continue Farxiga 10 mg, pioglitazone 30 mg and metformin  mg 2 BID.   Ophthalmologist referral placed again.

## 2020-08-04 NOTE — ASSESSMENT & PLAN NOTE
Blood pressure is higher/edema.   Start HCTZ 12.5 mg qAM.   Monitor home blood pressure readings.      BP Readings from Last 3 Encounters:   08/04/20 146/70   05/06/20 120/80   03/04/20 126/78

## 2020-08-04 NOTE — PROGRESS NOTES
"[facility] [place] [facility]  PROSPER JONES M.D.      Meño Welsh / 75 y.o. / male  08/04/2020      VITAL SIGNS     Visit Vitals  /70   Pulse 60   Temp 96 °F (35.6 °C)   Ht 175.3 cm (69.02\")   Wt 93.9 kg (207 lb)   SpO2 98%   BMI 30.55 kg/m²       BP Readings from Last 3 Encounters:   08/04/20 146/70   05/06/20 120/80   03/04/20 126/78     Wt Readings from Last 3 Encounters:   08/04/20 93.9 kg (207 lb)   05/06/20 94.3 kg (208 lb)   03/04/20 96.2 kg (212 lb)     Body mass index is 30.55 kg/m².      MEDICATIONS     Current Outpatient Medications   Medication Sig Dispense Refill   • Dapagliflozin Propanediol (Farxiga) 10 MG tablet Take 10 mg by mouth Every Morning Before Breakfast. 90 tablet 1   • fenofibrate (TRICOR) 145 MG tablet TAKE ONE TABLET BY MOUTH DAILY 30 tablet 11   • fluticasone (FLONASE) 50 MCG/ACT nasal spray 2 sprays by Each Nare route Daily. 16 g 11   • glucose blood (WAVESENSE PRESTO) test strip Check BS once daily 100 each 0   • glucose blood test strip [employer] Blood Glucose Test In Vitro Strip; Patient Sig: [employer] Blood Glucose Test In Vitro Strip TEST BLOOD SUGAR ONCE DAILY; 1; 3; 25-Sep-2015; Active     • [employer] LANCETS THIN 26G misc Use to test blood sugar one time daily. *PT MUST MAKE APPT FOR FUTURE REFILLS* 100 each 0   • [employer] LANCETS THIN 26G misc USE ONE LANCET TO TEST DAILY 100 each 11   • lisinopril (PRINIVIL,ZESTRIL) 30 MG tablet TAKE ONE TABLET BY MOUTH DAILY 90 tablet 1   • metFORMIN ER (GLUCOPHAGE-XR) 500 MG 24 hr tablet TAKE TWO TABLETS BY MOUTH TWICE A  tablet 1   • pioglitazone (ACTOS) 30 MG tablet TAKE ONE TABLET BY MOUTH EVERY MORNING 90 tablet 6   • WAVESENSE PRESTO test strip USE ONE STRIP TO TEST DAILY 100 each 11     CHIEF COMPLAINT     Diabetes and Hypertension      ASSESSMENT & PLAN     Problem List Items Addressed This Visit        High    Type 2 diabetes mellitus with hyperglycemia (CMS/HCC) - Primary (Chronic)    Current Assessment & Plan     Check A1c.  Continue Farxiga " 10 mg, pioglitazone 30 mg and metformin  mg 2 BID.          Relevant Medications    pioglitazone (ACTOS) 30 MG tablet    metFORMIN ER (GLUCOPHAGE-XR) 500 MG 24 hr tablet    Dapagliflozin Propanediol (Farxiga) 10 MG tablet    Other Relevant Orders    Hemoglobin A1c    Basic Metabolic Panel    Ambulatory Referral to Ophthalmology    Microalbumin / Creatinine Urine Ratio - Urine, Clean Catch    Urinalysis With Microscopic If Indicated (No Culture) - Urine, Clean Catch    Hypertension (Chronic)    Current Assessment & Plan     Blood pressure is higher/edema.   Start HCTZ 12.5 mg qAM.   Monitor home blood pressure readings.      BP Readings from Last 3 Encounters:   08/04/20 146/70   05/06/20 120/80   03/04/20 126/78             Relevant Medications    lisinopril (PRINIVIL,ZESTRIL) 30 MG tablet    hydroCHLOROthiazide (HYDRODIURIL) 12.5 MG tablet    Other Relevant Orders    Basic Metabolic Panel       Medium    Hyperlipidemia (Chronic)    Overview     Continue fenofibrate 145 mg          Relevant Medications    fenofibrate (TRICOR) 145 MG tablet    Generalized edema (Chronic)    Current Assessment & Plan     Start HCTZ for higher blood pressure and edema.   Recommended compression stockings.   Decrease sodium.            Other Visit Diagnoses     Screening for colon cancer        Relevant Orders    Cologuard - Stool, Per Rectum        Orders Placed This Encounter   Procedures   • Hemoglobin A1c   • Basic Metabolic Panel   • Cologuard - Stool, Per Rectum   • Microalbumin / Creatinine Urine Ratio - Urine, Clean Catch   • Urinalysis With Microscopic If Indicated (No Culture) - Urine, Clean Catch   • Ambulatory Referral to Ophthalmology     New Medications Ordered This Visit   Medications   • hydroCHLOROthiazide (HYDRODIURIL) 12.5 MG tablet     Sig: Take 1 tablet by mouth Daily.     Dispense:  30 tablet     Refill:  3       Summary/Discussion:  •     Next Appointment with me: Visit date not found    Return in about 3  months (around 11/4/2020) for Reassess chronic medical problems.    _____________________________________________________________________________________    HISTORY OF PRESENT ILLNESS     DM 2: home blood sugar is little higher. Previously increased Farxiga to 10 mg. On pioglitazone 30 and metformin.   Lab Results   Component Value Date    HGBA1C 6.8 05/07/2020    HGBA1C 6.7 (H) 02/05/2020    HGBA1C 6.90 (H) 05/14/2019      Hypertension: blood pressure is higher today, does not check at home.   Edema little worse since previously stopping furosemide.     Hyperlipidemia on fenofibrate.       Patient Care Team:  Siva Godfrey MD as PCP - General      REVIEW OF SYSTEMS     Review of Systems  No chest pain or shortness of breath  Edema of legs    neg   Neuro neg for headaches       PHYSICAL EXAMINATION     Physical Exam  Constitutional  No distress  Cardiovascular Rate  normal . Rhythm: regular . Heart sounds:  Normal  Varicosities of bilateral lower extremities with 1+ edema   Pulmonary/Chest: Effort normal and breath sounds normal.    Psychiatric  Alert. Judgment and thought content normal. Mood normal     REVIEWED DATA     Labs:     Lab Results   Component Value Date     02/05/2020    K CANCELED 02/05/2020    CALCIUM 9.4 02/05/2020    AST 24 02/05/2020    ALT 12 02/05/2020    BUN 20 02/05/2020    CREATININE 1.20 02/05/2020    CREATININE 1.28 (H) 05/14/2019    CREATININE 1.02 03/22/2019    EGFRIFNONA 60 02/05/2020    EGFRIFAFRI 69 02/05/2020       Lab Results   Component Value Date    HGBA1C 6.8 05/07/2020    HGBA1C 6.7 (H) 02/05/2020    HGBA1C 6.90 (H) 05/14/2019    GLU CANCELED 02/05/2020    GLU 73 05/14/2019    GLU 91 03/22/2019    MICROALBUR 10.3 05/14/2019       Lab Results   Component Value Date    LDL 67 02/05/2020    LDL 66 05/14/2019    LDL 81 03/27/2018    HDL 50 02/05/2020    HDL 48 05/14/2019    HDL 53 03/27/2018    TRIG 121 02/05/2020    TRIG 110 05/14/2019    TRIG 89 03/27/2018    CHOLHDLRATIO 2.8  2020    CHOLHDLRATIO 2.83 2019    CHOLHDLRATIO 2.87 2018       Lab Results   Component Value Date    TSH 0.859 2019    FREET4 1.65 2019       Lab Results   Component Value Date    WBC 5.38 2018    HGB 13.5 (L) 2018    HGB 13.0 (L) 2018     2018       Lab Results   Component Value Date    PROTEIN Negative 2020    GLUCOSEU Negative 2020    BLOODU Negative 2020    NITRITEU Negative 2020    LEUKOCYTESUR Negative 2020       Imaging:         Medical Tests:         Summary of old records / correspondence / consultant report:         Request outside records:         ALLERGIES  No Known Allergies     PFSH:     The following portions of the patient's history were reviewed and updated as appropriate: Allergies / Current Medications / Past Medical History / Surgical History / Social History / Family History    PROBLEM LIST   Patient Active Problem List   Diagnosis   • Type 2 diabetes mellitus with hyperglycemia (CMS/HCC)   • Allergic rhinitis   • Hyperlipidemia   • Hypertension   • Lumbago of lumbar region with sciatica   • Generalized edema       PAST MEDICAL HISTORY  Past Medical History:   Diagnosis Date   • Diabetes mellitus (CMS/HCC)    • Hyperlipidemia    • Hypertension        SURGICAL HISTORY  No past surgical history on file.    SOCIAL HISTORY  Social History     Socioeconomic History   • Marital status:      Spouse name: Not on file   • Number of children: Not on file   • Years of education: Not on file   • Highest education level: Not on file   Occupational History   • Occupation: [employer] owner   Tobacco Use   • Smoking status: Former Smoker     Years: 50.00     Types: Cigarettes     Last attempt to quit: 2012     Years since quittin.5   • Smokeless tobacco: Never Used   Substance and Sexual Activity   • Alcohol use: Not Currently       FAMILY HISTORY  Family History   Problem Relation Age of Onset   •  Diabetes Mother         lived to 90's   • Stomach cancer Sister    • Diabetes Brother    • Hypertension Brother    • No Known Problems Sister    • Colon cancer Neg Hx    • Prostate cancer Neg Hx          **Nadiaon Disclaimer:   Much of this encounter note is an electronic transcription/translation of spoken language to printed text. The electronic translation of spoken language may permit erroneous, or at times, nonsensical words or phrases to be inadvertently transcribed. Although I have reviewed the note for such errors, some may still exist.     Template created by Sugey Godfrey MD

## 2020-09-29 ENCOUNTER — DOCUMENTATION (OUTPATIENT)
Dept: INTERNAL MEDICINE | Age: 75
End: 2020-09-29

## 2020-10-07 ENCOUNTER — TELEPHONE (OUTPATIENT)
Dept: INTERNAL MEDICINE | Age: 75
End: 2020-10-07

## 2020-10-07 NOTE — TELEPHONE ENCOUNTER
----- Message from Siva Godfrey MD sent at 10/6/2020  7:24 AM EDT -----      ----- Message -----  From: Phoebe Stringer  Sent: 10/6/2020   7:04 AM EDT  To: Siva Godfrey MD

## 2020-10-30 DIAGNOSIS — I10 ESSENTIAL HYPERTENSION: Chronic | ICD-10-CM

## 2020-10-30 RX ORDER — HYDROCHLOROTHIAZIDE 12.5 MG/1
TABLET ORAL
Qty: 90 TABLET | Refills: 2 | Status: SHIPPED | OUTPATIENT
Start: 2020-10-30 | End: 2020-11-02

## 2020-11-02 DIAGNOSIS — E11.9 TYPE 2 DIABETES MELLITUS WITHOUT COMPLICATION (HCC): ICD-10-CM

## 2020-11-02 DIAGNOSIS — I10 ESSENTIAL HYPERTENSION: Chronic | ICD-10-CM

## 2020-11-02 RX ORDER — LISINOPRIL 30 MG/1
TABLET ORAL
Qty: 90 TABLET | Refills: 3 | Status: SHIPPED | OUTPATIENT
Start: 2020-11-02 | End: 2021-06-30

## 2020-11-02 RX ORDER — METFORMIN HYDROCHLORIDE 500 MG/1
1000 TABLET, EXTENDED RELEASE ORAL 2 TIMES DAILY
Qty: 360 TABLET | Refills: 3 | Status: SHIPPED | OUTPATIENT
Start: 2020-11-02 | End: 2021-12-23

## 2020-11-02 RX ORDER — HYDROCHLOROTHIAZIDE 12.5 MG/1
TABLET ORAL
Qty: 90 TABLET | Refills: 3 | Status: SHIPPED | OUTPATIENT
Start: 2020-11-02 | End: 2021-03-24 | Stop reason: SDUPTHER

## 2020-11-02 NOTE — TELEPHONE ENCOUNTER
Caller: Meño Salas    Relationship: Self    Best call back number: [phone]     Medication needed:   Requested Prescriptions     Pending Prescriptions Disp Refills   • lisinopril (PRINIVIL,ZESTRIL) 30 MG tablet [Pharmacy Med Name: LISINOPRIL 30 MG TABLET] 90 tablet 3     Sig: TAKE ONE TABLET BY MOUTH DAILY   • hydroCHLOROthiazide (HYDRODIURIL) 12.5 MG tablet [Pharmacy Med Name: hydroCHLOROthiazide 12.5 MG TABLET] 90 tablet 3     Sig: TAKE ONE TABLET BY MOUTH DAILY   • metFORMIN ER (GLUCOPHAGE-XR) 500 MG 24 hr tablet 360 tablet 1     Sig: Take 2 tablets by mouth 2 (Two) Times a Day.       When do you need the refill by: 11/04/2020    What details did the patient provide when requesting the medication: HAS MORE THAN 3 DAYS LEFT    Does the patient have less than a 3 day supply:  [] Yes  [x] No    What is the patient's preferred pharmacy: [facility] [place] [address] AT [facility] & [address]  [phone] [facility] [phone] FX

## 2020-11-23 ENCOUNTER — OFFICE VISIT (OUTPATIENT)
Dept: INTERNAL MEDICINE | Age: 75
End: 2020-11-23

## 2020-11-23 VITALS
TEMPERATURE: 97.1 F | OXYGEN SATURATION: 95 % | WEIGHT: 201 LBS | DIASTOLIC BLOOD PRESSURE: 70 MMHG | HEIGHT: 69 IN | SYSTOLIC BLOOD PRESSURE: 130 MMHG | HEART RATE: 61 BPM | BODY MASS INDEX: 29.77 KG/M2

## 2020-11-23 DIAGNOSIS — E11.65 TYPE 2 DIABETES MELLITUS WITH HYPERGLYCEMIA, WITHOUT LONG-TERM CURRENT USE OF INSULIN (HCC): Chronic | ICD-10-CM

## 2020-11-23 DIAGNOSIS — I10 ESSENTIAL HYPERTENSION: Primary | Chronic | ICD-10-CM

## 2020-11-23 DIAGNOSIS — R60.1 GENERALIZED EDEMA: Chronic | ICD-10-CM

## 2020-11-23 LAB — HBA1C MFR BLD: 6.6 %

## 2020-11-23 PROCEDURE — 99214 OFFICE O/P EST MOD 30 MIN: CPT | Performed by: INTERNAL MEDICINE

## 2020-11-23 PROCEDURE — 83036 HEMOGLOBIN GLYCOSYLATED A1C: CPT | Performed by: INTERNAL MEDICINE

## 2020-11-23 RX ORDER — SIMVASTATIN 10 MG
TABLET ORAL
COMMUNITY
Start: 2020-10-01 | End: 2020-12-07

## 2020-11-23 NOTE — PROGRESS NOTES
Send result letter to patient.    Bang, these are your recent lab results:    A1c for average glucose level is improving. Continue current plans.     Please call my office if you have any questions. Otherwise, we can discuss the results in further detail on your next visit.

## 2020-11-23 NOTE — ASSESSMENT & PLAN NOTE
Blood pressure is improved with HCTZ.   Continue HCTZ 12.5 mg, lisinopril 30 mg for hypertension.

## 2020-11-23 NOTE — PROGRESS NOTES
"[facility] [place] [facility]  PROSPER JONES M.D.      Bang Kazakh / 75 y.o. / male  11/23/2020    CHIEF COMPLAINT     Hypertension, Diabetes, and Edema      ASSESSMENT & PLAN     Problem List Items Addressed This Visit        High    Type 2 diabetes mellitus with hyperglycemia (CMS/HCC) (Chronic)    Current Assessment & Plan     POCT A1c:          Relevant Medications    pioglitazone (ACTOS) 30 MG tablet    Dapagliflozin Propanediol (Farxiga) 10 MG tablet    metFORMIN ER (GLUCOPHAGE-XR) 500 MG 24 hr tablet    Other Relevant Orders    POC Glycosylated Hemoglobin (Hb A1C)    Hypertension - Primary (Chronic)    Current Assessment & Plan     Blood pressure is improved with HCTZ.   Continue HCTZ 12.5 mg, lisinopril 30 mg for hypertension.          Relevant Medications    lisinopril (PRINIVIL,ZESTRIL) 30 MG tablet    hydroCHLOROthiazide (HYDRODIURIL) 12.5 MG tablet       Medium    Generalized edema (Chronic)    Current Assessment & Plan     Improved with addition of HCTZ 12.5 mg.   Also on Farxiga 10 mg.              Orders Placed This Encounter   Procedures   • POC Glycosylated Hemoglobin (Hb A1C)     No orders of the defined types were placed in this encounter.      Summary/Discussion:  •     Next Appointment with me: Visit date not found    Return in about 4 months (around 3/23/2021) for Reassess chronic medical problems.    _____________________________________________________________________________________    VITAL SIGNS     Visit Vitals  /70   Pulse 61   Temp 97.1 °F (36.2 °C)   Ht 175.3 cm (69.02\")   Wt 91.2 kg (201 lb)   SpO2 95%   BMI 29.67 kg/m²       BP Readings from Last 3 Encounters:   11/23/20 130/70   08/04/20 146/70   05/06/20 120/80     Wt Readings from Last 3 Encounters:   11/23/20 91.2 kg (201 lb)   08/04/20 93.9 kg (207 lb)   05/06/20 94.3 kg (208 lb)     Body mass index is 29.67 kg/m².      MEDICATIONS     Current Outpatient Medications   Medication Sig Dispense Refill   • Dapagliflozin Propanediol " (Farxiga) 10 MG tablet Take 10 mg by mouth Every Morning Before Breakfast. 90 tablet 1   • fenofibrate (TRICOR) 145 MG tablet TAKE ONE TABLET BY MOUTH DAILY 30 tablet 11   • fluticasone (FLONASE) 50 MCG/ACT nasal spray 2 sprays by Each Nare route Daily. 16 g 11   • glucose blood (WAVESENSE PRESTO) test strip Check BS once daily 100 each 0   • glucose blood test strip [employer] Blood Glucose Test In Vitro Strip; Patient Sig: [employer] Blood Glucose Test In Vitro Strip TEST BLOOD SUGAR ONCE DAILY; 1; 3; 25-Sep-2015; Active     • hydroCHLOROthiazide (HYDRODIURIL) 12.5 MG tablet TAKE ONE TABLET BY MOUTH DAILY 90 tablet 3   • [employer] LANCETS THIN 26G misc Use to test blood sugar one time daily. *PT MUST MAKE APPT FOR FUTURE REFILLS* 100 each 0   • [employer] LANCETS THIN 26G misc USE ONE LANCET TO TEST DAILY 100 each 11   • lisinopril (PRINIVIL,ZESTRIL) 30 MG tablet TAKE ONE TABLET BY MOUTH DAILY 90 tablet 3   • metFORMIN ER (GLUCOPHAGE-XR) 500 MG 24 hr tablet Take 2 tablets by mouth 2 (Two) Times a Day. 360 tablet 3   • pioglitazone (ACTOS) 30 MG tablet TAKE ONE TABLET BY MOUTH EVERY MORNING 90 tablet 6   • WAVESENSE PRESTO test strip USE ONE STRIP TO TEST DAILY 100 each 11   • simvastatin (ZOCOR) 10 MG tablet        No current facility-administered medications for this visit.        _____________________________________________________________________________________    HISTORY OF PRESENT ILLNESS     Edema: significantly improved with HCTZ 12.5 mg.   Hypertension is also improved.   DM 2: overall blood sugar remains stable at home. No hypoglycemia.   Lab Results   Component Value Date    HGBA1C 6.89 (H) 08/04/2020    HGBA1C 6.8 05/07/2020    HGBA1C 6.7 (H) 02/05/2020          Patient Care Team:  Siva Godfrey MD as PCP - General      REVIEW OF SYSTEMS     Review of Systems  Wt loss  No chest pain or GUILLORY  No cough or shortness of breath  GI neg   Neuro neg       PHYSICAL EXAMINATION     Physical Exam  Cardiovascular Rate: normal.  Rhythm: regular. Heart sounds: normal. Significantly decreased edema of legs (trace)  Pulm/Chest: Effort normal, breath sounds normal.     REVIEWED DATA     Labs:     Lab Results   Component Value Date     09/30/2020    K 3.8 09/30/2020    CALCIUM 9.2 09/30/2020    AST 32 09/29/2020    ALT 15 09/29/2020    BUN 22 (H) 09/30/2020    CREATININE 1.10 09/30/2020    CREATININE 1.00 09/29/2020    CREATININE 0.98 08/04/2020    EGFRIFNONA 75 08/04/2020    EGFRIFAFRI 69 02/05/2020       Lab Results   Component Value Date    HGBA1C 6.89 (H) 08/04/2020    HGBA1C 6.8 05/07/2020    HGBA1C 6.7 (H) 02/05/2020     (H) 09/30/2020     (H) 09/29/2020    GLU CANCELED 02/05/2020    MICROALBUR 1.4 08/04/2020       Lab Results   Component Value Date    LDL 67 02/05/2020    LDL 66 05/14/2019    LDL 81 03/27/2018    HDL 50 02/05/2020    HDL 48 05/14/2019    HDL 53 03/27/2018    TRIG 121 02/05/2020    TRIG 110 05/14/2019    TRIG 89 03/27/2018    CHOLHDLRATIO 2.8 02/05/2020    CHOLHDLRATIO 2.83 05/14/2019    CHOLHDLRATIO 2.87 03/27/2018       Lab Results   Component Value Date    TSH 0.859 03/22/2019    FREET4 1.65 03/22/2019       Lab Results   Component Value Date    WBC 4.41 (L) 09/30/2020    HGB 12.4 (L) 09/30/2020    HGB 13.7 09/29/2020    HGB 13.5 (L) 12/03/2018     09/30/2020       Lab Results   Component Value Date    PROTEIN Negative 02/05/2020    GLUCOSEU >=1000 mg/dL (3+) (A) 08/04/2020    BLOODU Negative 08/04/2020    NITRITEU Negative 08/04/2020    LEUKOCYTESUR Negative 08/04/2020       Imaging:           Medical Tests:           Summary of old records / correspondence / consultant report:           Request outside records:           ALLERGIES  No Known Allergies     PFSH:     The following portions of the patient's history were reviewed and updated as appropriate: Allergies / Current Medications / Past Medical History / Surgical History / Social History / Family History    PROBLEM LIST   Patient Active  Problem List   Diagnosis   • Type 2 diabetes mellitus with hyperglycemia (CMS/HCC)   • Allergic rhinitis   • Hyperlipidemia   • Hypertension   • Lumbago of lumbar region with sciatica   • Generalized edema       PAST MEDICAL HISTORY  Past Medical History:   Diagnosis Date   • Diabetes mellitus (CMS/HCC)    • Hyperlipidemia    • Hypertension        SURGICAL HISTORY  No past surgical history on file.    SOCIAL HISTORY  Social History     Socioeconomic History   • Marital status:      Spouse name: Not on file   • Number of children: Not on file   • Years of education: Not on file   • Highest education level: Not on file   Occupational History   • Occupation: [employer] owner   Tobacco Use   • Smoking status: Former Smoker     Years: 50.00     Types: Cigarettes     Quit date:      Years since quittin.9   • Smokeless tobacco: Never Used   Substance and Sexual Activity   • Alcohol use: Not Currently       FAMILY HISTORY  Family History   Problem Relation Age of Onset   • Diabetes Mother         lived to 90's   • Stomach cancer Sister    • Diabetes Brother    • Hypertension Brother    • No Known Problems Sister    • Colon cancer Neg Hx    • Prostate cancer Neg Hx          Examiner was wearing KN95 mask, face shield and exam gloves during the entire duration of the visit. Patient was masked the entire time.   Minimum social distance of 6 ft maintained entire visit except if physical contact was necessary as documented.     **Dragon Disclaimer:   Much of this encounter note is an electronic transcription/translation of spoken language to printed text. The electronic translation of spoken language may permit erroneous, or at times, nonsensical words or phrases to be inadvertently transcribed. Although I have reviewed the note for such errors, some may still exist.     Template created by Sugey Godfrey MD

## 2020-12-05 ENCOUNTER — DOCUMENTATION (OUTPATIENT)
Dept: INTERNAL MEDICINE | Age: 75
End: 2020-12-05

## 2020-12-05 DIAGNOSIS — E78.2 MIXED HYPERLIPIDEMIA: Chronic | ICD-10-CM

## 2020-12-07 DIAGNOSIS — E78.2 MIXED HYPERLIPIDEMIA: Chronic | ICD-10-CM

## 2020-12-07 DIAGNOSIS — E78.2 MIXED HYPERLIPIDEMIA: Primary | Chronic | ICD-10-CM

## 2020-12-07 RX ORDER — ATORVASTATIN CALCIUM 20 MG/1
20 TABLET, FILM COATED ORAL
Qty: 30 TABLET | Refills: 5 | Status: SHIPPED | OUTPATIENT
Start: 2020-12-07 | End: 2021-06-14

## 2020-12-07 RX ORDER — FENOFIBRATE 145 MG/1
TABLET, COATED ORAL
Qty: 90 TABLET | Refills: 3 | OUTPATIENT
Start: 2020-12-07

## 2020-12-07 NOTE — ASSESSMENT & PLAN NOTE
Fenofibrate was stopped and started on simvastatin in the ER in September but he stopped taking this.   Will start atorvastatin 20 mg daily with supper.   Discussed on the phone.

## 2020-12-08 RX ORDER — FENOFIBRATE 145 MG/1
TABLET, COATED ORAL
Qty: 72 TABLET | Refills: 10 | OUTPATIENT
Start: 2020-12-08

## 2021-01-14 ENCOUNTER — TELEPHONE (OUTPATIENT)
Dept: INTERNAL MEDICINE | Age: 76
End: 2021-01-14

## 2021-01-14 NOTE — TELEPHONE ENCOUNTER
"----- Message from Siva Godfrey MD sent at 1/13/2021  5:46 PM EST -----  Regarding: RE: Cancellation of Order # [id number]  Verify with patient why he did not do the Cologuard test. Place another order if he chooses to do it.   Document in chart if he refuses/declines.   ----- Message -----  From: Rowena Poon MA  Sent: 1/13/2021  12:01 PM EST  To: Siva Godfrey MD  Subject: Cancellation of Order # [id number]                Order number [id number] for the procedure COLOGUARD [[id number]] has  been canceled by Rowena Poon MA [[id number]]. This procedure was   ordered by you on Aug 5, 2020 for the patient Meño Salas   [[id number]]. The reason for cancellation was \"Other\".    "

## 2021-01-20 ENCOUNTER — OFFICE VISIT (OUTPATIENT)
Dept: INTERNAL MEDICINE | Age: 76
End: 2021-01-20

## 2021-01-20 VITALS
TEMPERATURE: 96.2 F | HEIGHT: 69 IN | OXYGEN SATURATION: 97 % | WEIGHT: 208 LBS | HEART RATE: 72 BPM | DIASTOLIC BLOOD PRESSURE: 86 MMHG | SYSTOLIC BLOOD PRESSURE: 164 MMHG | BODY MASS INDEX: 30.81 KG/M2

## 2021-01-20 DIAGNOSIS — M54.16 RIGHT LUMBAR RADICULOPATHY: Primary | ICD-10-CM

## 2021-01-20 DIAGNOSIS — E11.65 TYPE 2 DIABETES MELLITUS WITH HYPERGLYCEMIA, WITHOUT LONG-TERM CURRENT USE OF INSULIN (HCC): Chronic | ICD-10-CM

## 2021-01-20 DIAGNOSIS — I10 ESSENTIAL HYPERTENSION: Chronic | ICD-10-CM

## 2021-01-20 PROCEDURE — 99214 OFFICE O/P EST MOD 30 MIN: CPT | Performed by: INTERNAL MEDICINE

## 2021-01-20 RX ORDER — METHYLPREDNISOLONE 4 MG/1
TABLET ORAL
Qty: 1 EACH | Refills: 0 | Status: SHIPPED | OUTPATIENT
Start: 2021-01-20 | End: 2021-03-24

## 2021-01-20 RX ORDER — TIMOLOL MALEATE 5 MG/ML
SOLUTION/ DROPS OPHTHALMIC
COMMUNITY
Start: 2020-12-23

## 2021-01-20 RX ORDER — GABAPENTIN 300 MG/1
300 CAPSULE ORAL 2 TIMES DAILY
Qty: 60 CAPSULE | Refills: 0 | Status: SHIPPED | OUTPATIENT
Start: 2021-01-20 | End: 2021-02-15

## 2021-01-20 NOTE — ASSESSMENT & PLAN NOTE
Recurrent pain.   Medrol Ez   Gabapentin 300 mg BID #60, no refill. Consent / agreement signed. Ab reviewed.    Recommended PT  Update me on status next week  Consider MRI if not improving.

## 2021-01-20 NOTE — PROGRESS NOTES
"    I N T E R N A L  M E D I C I N E  J U N O H  K I M,  M D      ENCOUNTER DATE:  01/20/2021    Meño Mongolian / 75 y.o. / male      CHIEF COMPLAINT / REASON FOR OFFICE VISIT     Sciatica (week)      ASSESSMENT & PLAN     Problem List Items Addressed This Visit     Right lumbar radiculopathy - Primary    Current Assessment & Plan     Recurrent pain.   Medrol Ez   Gabapentin 300 mg BID #60, no refill. Consent / agreement signed. Ab reviewed.    Recommended PT  Update me on status next week  Consider MRI if not improving.          Relevant Medications    methylPREDNISolone (Medrol) 4 MG dose pack    gabapentin (NEURONTIN) 300 MG capsule    Type 2 diabetes mellitus with hyperglycemia (CMS/HCC) (Chronic)    Relevant Medications    pioglitazone (ACTOS) 30 MG tablet    Dapagliflozin Propanediol (Farxiga) 10 MG tablet    metFORMIN ER (GLUCOPHAGE-XR) 500 MG 24 hr tablet    Hypertension (Chronic)    Relevant Medications    lisinopril (PRINIVIL,ZESTRIL) 30 MG tablet    hydroCHLOROthiazide (HYDRODIURIL) 12.5 MG tablet        No orders of the defined types were placed in this encounter.    New Medications Ordered This Visit   Medications   • methylPREDNISolone (Medrol) 4 MG dose pack     Sig: Take as directed on package instructions.     Dispense:  1 each     Refill:  0   • gabapentin (NEURONTIN) 300 MG capsule     Sig: Take 1 capsule by mouth 2 (two) times a day. Start taking 1 capsule at bedtime and titrate up to twice daily     Dispense:  60 capsule     Refill:  0       SUMMARY/DISCUSSION  • Monitor blood sugar with Medrol Ez   • Avoid NSAIDS due to elevated blood pressure   • Take Tylenol PRN pain       Next Appointment with me: 3/24/2021    No follow-ups on file.      VITAL SIGNS     Visit Vitals  /86   Pulse 72   Temp 96.2 °F (35.7 °C)   Ht 175.3 cm (69.02\")   Wt 94.3 kg (208 lb)   SpO2 97%   BMI 30.70 kg/m²       BP Readings from Last 3 Encounters:   01/20/21 164/86   11/23/20 130/70   08/04/20 146/70     Wt " Readings from Last 3 Encounters:   01/20/21 94.3 kg (208 lb)   11/23/20 91.2 kg (201 lb)   08/04/20 93.9 kg (207 lb)     Body mass index is 30.7 kg/m².        HISTORY OF PRESENT ILLNESS     Recurrent right lumbar radicular pain with paresthesia, moderate/severe pain.   Mild subjective weakness of right lower extremity   No bowel/bladder issue.   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   Prior lumbar xray 2018 showed degenerative changes.         REVIEW OF SYSTEMS           PHYSICAL EXAMINATION     Physical Exam  Looks uncomfortable from pain  Psych: Normal mood and affect. Alert and intact judgment.   No low back tenderness to palpation   Right sacral area tenderness  Bilateral lower extremities strength and reflexes are within normal       REVIEWED DATA     Labs:     Lab Results   Component Value Date     09/30/2020    K 3.8 09/30/2020    AST 32 09/29/2020    ALT 15 09/29/2020    BUN 22 (H) 09/30/2020    CREATININE 1.10 09/30/2020    CREATININE 1.00 09/29/2020    CREATININE 0.98 08/04/2020    EGFRIFNONA 75 08/04/2020    EGFRIFAFRI 69 02/05/2020       Lab Results   Component Value Date    HGBA1C 6.6 11/23/2020    HGBA1C 6.89 (H) 08/04/2020    HGBA1C 6.8 05/07/2020       Lab Results   Component Value Date    LDL 67 02/05/2020    LDL 66 05/14/2019    HDL 50 02/05/2020    TRIG 121 02/05/2020       Lab Results   Component Value Date    TSH 0.859 03/22/2019    FREET4 1.65 03/22/2019       Lab Results   Component Value Date    WBC 4.41 (L) 09/30/2020    HGB 12.4 (L) 09/30/2020     09/30/2020         Imaging:     LUMBAR SPINE X-RAYS     HISTORY: Back pain with left leg pain.     TECHNIQUE: Five x-rays of the lumbar spine are provided.     FINDINGS: There is bridging enthesophyte anteriorly at the T11-12,  T12-L1, and L3-4 levels. Vertebral body height and alignment are normal  throughout the lumbar spine. Disc height is fairly well preserved. There  is facet arthropathy at L4-5 and  L5-S1. The SI joints appear normal. No  bone lesion is identified.     IMPRESSION:  Degenerative change in the lumbar and lower thoracic spine.     This report was finalized on 6/5/2018       Medical Tests:           Summary of old records / correspondence / consultant report:           Request outside records:           MEDICATIONS AT THE TIME OF OFFICE VISIT     Current Outpatient Medications   Medication Sig Dispense Refill   • atorvastatin (LIPITOR) 20 MG tablet Take 1 tablet by mouth Daily Before Supper. 30 tablet 5   • Dapagliflozin Propanediol (Farxiga) 10 MG tablet Take 10 mg by mouth Every Morning Before Breakfast. 90 tablet 1   • fluticasone (FLONASE) 50 MCG/ACT nasal spray 2 sprays by Each Nare route Daily. 16 g 11   • glucose blood (WAVESENSE PRESTO) test strip Check BS once daily 100 each 0   • glucose blood test strip [employer] Blood Glucose Test In Vitro Strip; Patient Sig: [employer] Blood Glucose Test In Vitro Strip TEST BLOOD SUGAR ONCE DAILY; 1; 3; 25-Sep-2015; Active     • hydroCHLOROthiazide (HYDRODIURIL) 12.5 MG tablet TAKE ONE TABLET BY MOUTH DAILY 90 tablet 3   • [employer] LANCETS THIN 26G misc Use to test blood sugar one time daily. *PT MUST MAKE APPT FOR FUTURE REFILLS* 100 each 0   • [employer] LANCETS THIN 26G misc USE ONE LANCET TO TEST DAILY 100 each 11   • lisinopril (PRINIVIL,ZESTRIL) 30 MG tablet TAKE ONE TABLET BY MOUTH DAILY 90 tablet 3   • metFORMIN ER (GLUCOPHAGE-XR) 500 MG 24 hr tablet Take 2 tablets by mouth 2 (Two) Times a Day. 360 tablet 3   • pioglitazone (ACTOS) 30 MG tablet TAKE ONE TABLET BY MOUTH EVERY MORNING 90 tablet 6   • WAVESENSE PRESTO test strip USE ONE STRIP TO TEST DAILY 100 each 11   • gabapentin (NEURONTIN) 300 MG capsule Take 1 capsule by mouth 2 (two) times a day. Start taking 1 capsule at bedtime and titrate up to twice daily 60 capsule 0   • methylPREDNISolone (Medrol) 4 MG dose pack Take as directed on package instructions. 1 each 0   • timolol (TIMOPTIC) 0.5 %  ophthalmic solution        No current facility-administered medications for this visit.            *Examiner was wearing KN95 mask, face shield and exam gloves during the entire duration of the visit. Patient was masked the entire time.   Minimum social distance of 6 ft maintained entire visit except if physical contact was necessary as documented.     **Dragon Disclaimer:   Much of this encounter note is an electronic transcription/translation of spoken language to printed text. The electronic translation of spoken language may permit erroneous, or at times, nonsensical words or phrases to be inadvertently transcribed. Although I have reviewed the note for such errors, some may still exist.     Template created by Sugey Godfrey MD

## 2021-01-25 DIAGNOSIS — E11.65 TYPE 2 DIABETES MELLITUS WITH HYPERGLYCEMIA, WITHOUT LONG-TERM CURRENT USE OF INSULIN (HCC): Primary | Chronic | ICD-10-CM

## 2021-01-25 RX ORDER — GLIMEPIRIDE 1 MG/1
1 TABLET ORAL
Qty: 7 TABLET | Refills: 0 | Status: SHIPPED | OUTPATIENT
Start: 2021-01-25 | End: 2021-01-29

## 2021-01-29 DIAGNOSIS — E11.9 TYPE 2 DIABETES MELLITUS WITHOUT COMPLICATION (HCC): ICD-10-CM

## 2021-01-29 DIAGNOSIS — E11.65 TYPE 2 DIABETES MELLITUS WITH HYPERGLYCEMIA, WITHOUT LONG-TERM CURRENT USE OF INSULIN (HCC): Chronic | ICD-10-CM

## 2021-01-29 RX ORDER — GLIMEPIRIDE 1 MG/1
TABLET ORAL
Qty: 90 TABLET | Refills: 3 | Status: SHIPPED | OUTPATIENT
Start: 2021-01-29 | End: 2021-03-24

## 2021-01-29 RX ORDER — PIOGLITAZONEHYDROCHLORIDE 30 MG/1
TABLET ORAL
Qty: 90 TABLET | Refills: 3 | Status: SHIPPED | OUTPATIENT
Start: 2021-01-29 | End: 2022-01-31

## 2021-02-15 DIAGNOSIS — M54.16 RIGHT LUMBAR RADICULOPATHY: ICD-10-CM

## 2021-02-15 RX ORDER — GABAPENTIN 300 MG/1
CAPSULE ORAL
Qty: 60 CAPSULE | Refills: 0 | Status: SHIPPED | OUTPATIENT
Start: 2021-02-15 | End: 2021-04-01

## 2021-03-24 ENCOUNTER — OFFICE VISIT (OUTPATIENT)
Dept: INTERNAL MEDICINE | Age: 76
End: 2021-03-24

## 2021-03-24 VITALS
WEIGHT: 207 LBS | TEMPERATURE: 97.8 F | HEIGHT: 69 IN | OXYGEN SATURATION: 95 % | DIASTOLIC BLOOD PRESSURE: 70 MMHG | BODY MASS INDEX: 30.66 KG/M2 | SYSTOLIC BLOOD PRESSURE: 140 MMHG | HEART RATE: 68 BPM

## 2021-03-24 DIAGNOSIS — E11.65 TYPE 2 DIABETES MELLITUS WITH HYPERGLYCEMIA, WITHOUT LONG-TERM CURRENT USE OF INSULIN (HCC): Primary | Chronic | ICD-10-CM

## 2021-03-24 DIAGNOSIS — J30.1 SEASONAL ALLERGIC RHINITIS DUE TO POLLEN: ICD-10-CM

## 2021-03-24 DIAGNOSIS — I10 ESSENTIAL HYPERTENSION: Chronic | ICD-10-CM

## 2021-03-24 DIAGNOSIS — E78.2 MIXED HYPERLIPIDEMIA: Chronic | ICD-10-CM

## 2021-03-24 DIAGNOSIS — R60.1 GENERALIZED EDEMA: Chronic | ICD-10-CM

## 2021-03-24 DIAGNOSIS — Z51.81 THERAPEUTIC DRUG MONITORING: ICD-10-CM

## 2021-03-24 PROCEDURE — 99214 OFFICE O/P EST MOD 30 MIN: CPT | Performed by: INTERNAL MEDICINE

## 2021-03-24 RX ORDER — FLUTICASONE PROPIONATE 50 MCG
2 SPRAY, SUSPENSION (ML) NASAL DAILY
Qty: 16 G | Refills: 11 | Status: SHIPPED | OUTPATIENT
Start: 2021-03-24 | End: 2021-11-03 | Stop reason: SDUPTHER

## 2021-03-24 RX ORDER — HYDROCHLOROTHIAZIDE 12.5 MG/1
12.5 TABLET ORAL DAILY
Qty: 90 TABLET | Refills: 1 | Status: SHIPPED | OUTPATIENT
Start: 2021-03-24 | End: 2021-11-03

## 2021-03-24 NOTE — ASSESSMENT & PLAN NOTE
Has not been taking HCTZ 12.5 mg. Blood pressure is little higher.   Restart HCTZ 12.5 mg qAM. Prescription sent to pharmacy.   Continue lisinopril 30 mg qd.     BP Readings from Last 3 Encounters:   03/24/21 140/70   01/20/21 164/86   11/23/20 130/70

## 2021-03-24 NOTE — ASSESSMENT & PLAN NOTE
Continue atorvastatin 20 mg qd. Will check labs on follow-up in 3 months.     Lab Results   Component Value Date    LDL 67 02/05/2020    LDL 66 05/14/2019    LDL 81 03/27/2018    HDL 50 02/05/2020    TRIG 121 02/05/2020    CHOLHDLRATIO 2.8 02/05/2020

## 2021-03-24 NOTE — PROGRESS NOTES
I N T E R N A L  M E D I C I N E  J U N O H  K I M,  M D      ENCOUNTER DATE:  03/24/2021    Bang Italian / 76 y.o. / male      CHIEF COMPLAINT / REASON FOR OFFICE VISIT     Diabetes, Hypertension, Hyperlipidemia, and Right lumbar radiculopathy      ASSESSMENT & PLAN     Problem List Items Addressed This Visit        High    Type 2 diabetes mellitus with hyperglycemia (CMS/HCC) - Primary (Chronic)    Current Assessment & Plan     Continue metformin  mg 2 BID, Farxiga 10 mg and pioglitazone 30 mg qd.          Relevant Medications    Dapagliflozin Propanediol (Farxiga) 10 MG tablet    metFORMIN ER (GLUCOPHAGE-XR) 500 MG 24 hr tablet    pioglitazone (ACTOS) 30 MG tablet    Hypertension (Chronic)    Current Assessment & Plan     Has not been taking HCTZ 12.5 mg. Blood pressure is little higher.   Restart HCTZ 12.5 mg qAM. Prescription sent to pharmacy.   Continue lisinopril 30 mg qd.     BP Readings from Last 3 Encounters:   03/24/21 140/70   01/20/21 164/86   11/23/20 130/70             Relevant Medications    lisinopril (PRINIVIL,ZESTRIL) 30 MG tablet    hydroCHLOROthiazide (HYDRODIURIL) 12.5 MG tablet       Medium    Hyperlipidemia (Chronic)    Current Assessment & Plan     Continue atorvastatin 20 mg qd. Will check labs on follow-up in 3 months.     Lab Results   Component Value Date    LDL 67 02/05/2020    LDL 66 05/14/2019    LDL 81 03/27/2018    HDL 50 02/05/2020    TRIG 121 02/05/2020    CHOLHDLRATIO 2.8 02/05/2020             Relevant Medications    atorvastatin (LIPITOR) 20 MG tablet    Generalized edema (Chronic)    Current Assessment & Plan     Resume HCTZ 12.5 mg for hypertension. On Farxiga.             Low    Allergic rhinitis (Chronic)    Current Assessment & Plan     Refilled fluticasone nasal spray.          Relevant Medications    fluticasone (FLONASE) 50 MCG/ACT nasal spray      Other Visit Diagnoses     Therapeutic drug monitoring        Relevant Orders    Compliance Drug Analysis, Ur - Urine,  "Clean Catch        Orders Placed This Encounter   Procedures   • Compliance Drug Analysis, Ur - Urine, Clean Catch     New Medications Ordered This Visit   Medications   • hydroCHLOROthiazide (HYDRODIURIL) 12.5 MG tablet     Sig: Take 1 tablet by mouth Daily.     Dispense:  90 tablet     Refill:  1   • fluticasone (FLONASE) 50 MCG/ACT nasal spray     Si sprays by Each Nare route Daily.     Dispense:  16 g     Refill:  11       SUMMARY/DISCUSSION  •       Next Appointment with me: Visit date not found    Return in about 3 months (around 2021) for Reassess chronic medical problems.        VITAL SIGNS     Visit Vitals  /70 (BP Location: Left arm)   Pulse 68   Temp 97.8 °F (36.6 °C)   Ht 175.3 cm (69.02\")   Wt 93.9 kg (207 lb)   SpO2 95%   BMI 30.55 kg/m²       BP Readings from Last 3 Encounters:   21 140/70   21 164/86   20 130/70     Wt Readings from Last 3 Encounters:   21 93.9 kg (207 lb)   21 94.3 kg (208 lb)   20 91.2 kg (201 lb)     Body mass index is 30.55 kg/m².      MEDICATIONS AT THE TIME OF OFFICE VISIT     Current Outpatient Medications on File Prior to Visit   Medication Sig   • atorvastatin (LIPITOR) 20 MG tablet Take 1 tablet by mouth Daily Before Supper.   • Dapagliflozin Propanediol (Farxiga) 10 MG tablet Take 10 mg by mouth Every Morning Before Breakfast.   • gabapentin (NEURONTIN) 300 MG capsule START BY TAKING ONE CAPSULE BY MOUTH AT BEDTIME AND TRITRATE UP TO TWICE DAILY AS DIRECTED   • glucose blood (WAVESENSE PRESTO) test strip Check BS once daily   • glucose blood test strip [employer] Blood Glucose Test In Vitro Strip; Patient Sig: [employer] Blood Glucose Test In Vitro Strip TEST BLOOD SUGAR ONCE DAILY; 1; 3; -Sep-2015; Active   • [employer] LANCETS THIN 26G misc Use to test blood sugar one time daily. *PT MUST MAKE APPT FOR FUTURE REFILLS*   • [employer] LANCETS THIN 26G misc USE ONE LANCET TO TEST DAILY   • lisinopril (PRINIVIL,ZESTRIL) 30 MG tablet TAKE " ONE TABLET BY MOUTH DAILY   • metFORMIN ER (GLUCOPHAGE-XR) 500 MG 24 hr tablet Take 2 tablets by mouth 2 (Two) Times a Day.   • pioglitazone (ACTOS) 30 MG tablet TAKE ONE TABLET BY MOUTH EVERY MORNING   • timolol (TIMOPTIC) 0.5 % ophthalmic solution    • WAVESENSE PRESTO test strip USE ONE STRIP TO TEST DAILY   • fluticasone (FLONASE) 50 MCG/ACT nasal spray 2 sprays by Each Nare route Daily.   •  hydroCHLOROthiazide (HYDRODIURIL) 12.5 MG tablet  NOT TAKING TAKE ONE TABLET BY MOUTH DAILY         HISTORY OF PRESENT ILLNESS     Diabetes is little higher recently. On Farxiga 10 mg, metformin and pioglitazone.     Lab Results   Component Value Date    HGBA1C 6.6 11/23/2020    HGBA1C 6.89 (H) 08/04/2020    HGBA1C 6.8 05/07/2020    CREATININE 1.10 09/30/2020    LDL 67 02/05/2020    MICROALBUR 1.4 08/04/2020      Not taking HCTZ 12.5 mg, on lisinopril 30 mg only. Increased edema of legs noted. Denies headaches or chest pain.     On atorvastatin 20 mg without problems.       REVIEW OF SYSTEMS     Constitutional neg except per HPI   Resp neg  CV neg   Neuro neg for change       PHYSICAL EXAMINATION     Physical Exam  Cardiovascular Rate: normal. Rhythm: regular. Heart sounds: normal   Pulm/Chest: Effort normal, breath sounds normal.   1+ bilateral lower extremities edema.       REVIEWED DATA     Labs:       Lab Results   Component Value Date     09/30/2020    K 3.8 09/30/2020    CALCIUM 9.2 09/30/2020    AST 32 09/29/2020    ALT 15 09/29/2020    BUN 22 (H) 09/30/2020    CREATININE 1.10 09/30/2020    CREATININE 1.00 09/29/2020    CREATININE 0.98 08/04/2020    EGFRIFNONA 75 08/04/2020    EGFRIFAFRI 69 02/05/2020       Lab Results   Component Value Date    HGBA1C 6.6 11/23/2020    HGBA1C 6.89 (H) 08/04/2020    HGBA1C 6.8 05/07/2020       Lab Results   Component Value Date    LDL 67 02/05/2020    LDL 66 05/14/2019    HDL 50 02/05/2020    TRIG 121 02/05/2020       Lab Results   Component Value Date    TSH 0.859 03/22/2019     FREET4 1.65 03/22/2019       Lab Results   Component Value Date    WBC 4.41 (L) 09/30/2020    HGB 12.4 (L) 09/30/2020     09/30/2020         Imaging:           Medical Tests:           Summary of old records / correspondence / consultant report:           Request outside records:           *Examiner was wearing KN95 mask, face shield and exam gloves during the entire duration of the visit. Patient was masked the entire time.   Minimum social distance of 6 ft maintained entire visit except if physical contact was necessary as documented.     **Dragon Disclaimer:   Much of this encounter note is an electronic transcription/translation of spoken language to printed text. The electronic translation of spoken language may permit erroneous, or at times, nonsensical words or phrases to be inadvertently transcribed. Although I have reviewed the note for such errors, some may still exist.     Template created by Sugey Godfrey MD

## 2021-03-25 DIAGNOSIS — M54.16 RIGHT LUMBAR RADICULOPATHY: ICD-10-CM

## 2021-04-01 RX ORDER — GABAPENTIN 300 MG/1
CAPSULE ORAL
Qty: 60 CAPSULE | Refills: 2 | Status: SHIPPED | OUTPATIENT
Start: 2021-04-01

## 2021-04-04 LAB — DRUGS UR: NORMAL

## 2021-06-03 DIAGNOSIS — E11.9 CONTROLLED TYPE 2 DIABETES MELLITUS WITHOUT COMPLICATION (HCC): ICD-10-CM

## 2021-06-03 RX ORDER — LANCETS
EACH MISCELLANEOUS
Qty: 100 EACH | Refills: 11 | Status: SHIPPED | OUTPATIENT
Start: 2021-06-03

## 2021-06-14 DIAGNOSIS — E78.2 MIXED HYPERLIPIDEMIA: Chronic | ICD-10-CM

## 2021-06-14 RX ORDER — ATORVASTATIN CALCIUM 20 MG/1
TABLET, FILM COATED ORAL
Qty: 30 TABLET | Refills: 11 | Status: SHIPPED | OUTPATIENT
Start: 2021-06-14 | End: 2022-06-27

## 2021-06-30 ENCOUNTER — OFFICE VISIT (OUTPATIENT)
Dept: INTERNAL MEDICINE | Age: 76
End: 2021-06-30

## 2021-06-30 VITALS
TEMPERATURE: 98 F | HEART RATE: 67 BPM | DIASTOLIC BLOOD PRESSURE: 78 MMHG | OXYGEN SATURATION: 96 % | HEIGHT: 69 IN | WEIGHT: 208 LBS | SYSTOLIC BLOOD PRESSURE: 152 MMHG | BODY MASS INDEX: 30.81 KG/M2

## 2021-06-30 DIAGNOSIS — E78.2 MIXED HYPERLIPIDEMIA: ICD-10-CM

## 2021-06-30 DIAGNOSIS — M51.36 DDD (DEGENERATIVE DISC DISEASE), LUMBAR: Chronic | ICD-10-CM

## 2021-06-30 DIAGNOSIS — E11.65 TYPE 2 DIABETES MELLITUS WITH HYPERGLYCEMIA, WITHOUT LONG-TERM CURRENT USE OF INSULIN (HCC): Primary | Chronic | ICD-10-CM

## 2021-06-30 DIAGNOSIS — I10 ESSENTIAL HYPERTENSION: Chronic | ICD-10-CM

## 2021-06-30 PROBLEM — M51.369 DDD (DEGENERATIVE DISC DISEASE), LUMBAR: Status: ACTIVE | Noted: 2021-06-30

## 2021-06-30 PROBLEM — M54.16 RIGHT LUMBAR RADICULOPATHY: Chronic | Status: ACTIVE | Noted: 2018-06-04

## 2021-06-30 PROBLEM — M51.369 DDD (DEGENERATIVE DISC DISEASE), LUMBAR: Chronic | Status: ACTIVE | Noted: 2021-06-30

## 2021-06-30 PROCEDURE — 99214 OFFICE O/P EST MOD 30 MIN: CPT | Performed by: INTERNAL MEDICINE

## 2021-06-30 RX ORDER — LISINOPRIL 40 MG/1
40 TABLET ORAL DAILY
Qty: 90 TABLET | Refills: 3 | Status: SHIPPED | OUTPATIENT
Start: 2021-06-30 | End: 2022-06-27

## 2021-06-30 NOTE — ASSESSMENT & PLAN NOTE
Blood pressure remains elevated.   Increase lisinopril to 40 mg qd.     BP Readings from Last 3 Encounters:   06/30/21 152/78   03/24/21 140/70   01/20/21 164/86

## 2021-06-30 NOTE — ASSESSMENT & PLAN NOTE
Chronic low back pain with significant radicular pain at this time. Has not been taking gabapentin. Did not go through with PT as recommended. Strongly encouraged to start PT. Take Tylenol PRN for pain.

## 2021-06-30 NOTE — PROGRESS NOTES
I N T E R N A L  M E D I C I N E  J U N O H  K I M,  M D      ENCOUNTER DATE:  06/30/2021    Bang Estonian / 76 y.o. / male      CHIEF COMPLAINT / REASON FOR OFFICE VISIT     Diabetes, Hypertension, Hyperlipidemia, and Generalized edema      ASSESSMENT & PLAN     Problem List Items Addressed This Visit        High    Type 2 diabetes mellitus with hyperglycemia (CMS/HCC) - Primary (Chronic)    Overview     Continue metformin  mg 2 BID, Farxiga 10 mg and pioglitazone 30 mg qd.          Current Assessment & Plan     Continue metformin  mg 2 BID, Farxiga 10 mg and pioglitazone 30 mg qd.   Lab Results   Component Value Date    HGBA1C 6.6 11/23/2020    HGBA1C 6.89 (H) 08/04/2020    HGBA1C 6.8 05/07/2020             Relevant Medications    Dapagliflozin Propanediol (Farxiga) 10 MG tablet    metFORMIN ER (GLUCOPHAGE-XR) 500 MG 24 hr tablet    pioglitazone (ACTOS) 30 MG tablet    glucose blood test strip    Other Relevant Orders    Hemoglobin A1c    Urinalysis With Microscopic If Indicated (No Culture) - Urine, Clean Catch    Hypertension (Chronic)    Current Assessment & Plan     Blood pressure remains elevated.   Increase lisinopril to 40 mg qd.     BP Readings from Last 3 Encounters:   06/30/21 152/78   03/24/21 140/70   01/20/21 164/86             Relevant Medications    hydroCHLOROthiazide (HYDRODIURIL) 12.5 MG tablet    lisinopril (PRINIVIL,ZESTRIL) 40 MG tablet    Other Relevant Orders    Microalbumin / Creatinine Urine Ratio - Urine, Clean Catch    Comprehensive Metabolic Panel    Urinalysis With Microscopic If Indicated (No Culture) - Urine, Clean Catch       Medium    Hyperlipidemia (Chronic)    Relevant Medications    atorvastatin (LIPITOR) 20 MG tablet    Other Relevant Orders    Lipid Panel With / Chol / HDL Ratio    Comprehensive Metabolic Panel    DDD (degenerative disc disease), lumbar (Chronic)    Current Assessment & Plan     Chronic low back pain with significant radicular pain at this time. Has  "not been taking gabapentin. Did not go through with PT as recommended. Strongly encouraged to start PT. Take Tylenol PRN for pain.              Orders Placed This Encounter   Procedures   • Hemoglobin A1c   • Microalbumin / Creatinine Urine Ratio - Urine, Clean Catch   • Lipid Panel With / Chol / HDL Ratio   • Comprehensive Metabolic Panel   • Urinalysis With Microscopic If Indicated (No Culture) - Urine, Clean Catch     New Medications Ordered This Visit   Medications   • glucose blood test strip     Sig: Check daily     Dispense:  100 each     Refill:  3   • lisinopril (PRINIVIL,ZESTRIL) 40 MG tablet     Sig: Take 1 tablet by mouth Daily.     Dispense:  90 tablet     Refill:  3       SUMMARY/DISCUSSION  •       Next Appointment with me: Visit date not found    Return in about 4 months (around 10/30/2021) for Reassess chronic medical problems.      VITAL SIGNS     Visit Vitals  /78   Pulse 67   Temp 98 °F (36.7 °C)   Ht 175.3 cm (69.02\")   Wt 94.3 kg (208 lb)   SpO2 96%   BMI 30.70 kg/m²       BP Readings from Last 3 Encounters:   06/30/21 152/78   03/24/21 140/70   01/20/21 164/86     Wt Readings from Last 3 Encounters:   06/30/21 94.3 kg (208 lb)   03/24/21 93.9 kg (207 lb)   01/20/21 94.3 kg (208 lb)     Body mass index is 30.7 kg/m².      MEDICATIONS AT THE TIME OF OFFICE VISIT     Current Outpatient Medications on File Prior to Visit   Medication Sig   • atorvastatin (LIPITOR) 20 MG tablet TAKE ONE TABLET BY MOUTH DAILY BEFORE SUPPER   • Dapagliflozin Propanediol (Farxiga) 10 MG tablet Take 10 mg by mouth Every Morning Before Breakfast.   • fluticasone (FLONASE) 50 MCG/ACT nasal spray 2 sprays by Each Nare route Daily.   • gabapentin (NEURONTIN) 300 MG capsule TAKE ONE CAPSULE BY MOUTH EVERY NIGHT AT BEDTIME AND TRITRATE UP TO TWO TIMES A DAY AS DIRECTED   • glucose blood (WAVESENSE PRESTO) test strip Check BS once daily   • glucose blood test strip [employer] Blood Glucose Test In Vitro Strip; Patient Sig: " [employer] Blood Glucose Test In Vitro Strip TEST BLOOD SUGAR ONCE DAILY; 1; 3; 25-Sep-2015; Active   • hydroCHLOROthiazide (HYDRODIURIL) 12.5 MG tablet Take 1 tablet by mouth Daily.   • [employer] LANCETS THIN 26G misc Use to test blood sugar one time daily. *PT MUST MAKE APPT FOR FUTURE REFILLS*   • [employer] Lancets Thin 26G misc USE ONE LANCET TO TEST DAILY   • lisinopril (PRINIVIL,ZESTRIL) 30 MG tablet TAKE ONE TABLET BY MOUTH DAILY   • metFORMIN ER (GLUCOPHAGE-XR) 500 MG 24 hr tablet Take 2 tablets by mouth 2 (Two) Times a Day.   • pioglitazone (ACTOS) 30 MG tablet TAKE ONE TABLET BY MOUTH EVERY MORNING   • timolol (TIMOPTIC) 0.5 % ophthalmic solution    • WAVESENSE PRESTO test strip USE ONE STRIP TO TEST DAILY     No current facility-administered medications on file prior to visit.          HISTORY OF PRESENT ILLNESS     Complains of chronic low back pain. Worse with standing/working. Plays golf on sundays. No radicular pain now. Not taking gabapentin. Did not do PT as recommended. Previously xray reviewed with him showing degenerative changes.     Diabetes remains stable overall.   Blood pressure is high > 140 at home. Edema is stable.       Patient Care Team:  Siva Godfrey MD as PCP - General    REVIEW OF SYSTEMS     Review of Systems       PHYSICAL EXAMINATION     Physical Exam  General: Alert with intact judgment   Cardiovascular Rate: normal. Rhythm: regular. Heart sounds: normal   MuSk lower back muscle tightness/spasm       REVIEWED DATA     Labs:     Lab Results   Component Value Date     09/30/2020    K 3.8 09/30/2020    CALCIUM 9.2 09/30/2020    AST 32 09/29/2020    ALT 15 09/29/2020    BUN 22 (H) 09/30/2020    CREATININE 1.10 09/30/2020    CREATININE 1.00 09/29/2020    CREATININE 0.98 08/04/2020    EGFRIFNONA 75 08/04/2020    EGFRIFAFRI 69 02/05/2020       Lab Results   Component Value Date    HGBA1C 6.6 11/23/2020    HGBA1C 6.89 (H) 08/04/2020    HGBA1C 6.8 05/07/2020       Lab Results   Component  Value Date    LDL 67 02/05/2020    LDL 66 05/14/2019    HDL 50 02/05/2020    TRIG 121 02/05/2020       Lab Results   Component Value Date    TSH 0.859 03/22/2019    TSH 1.140 03/27/2018    FREET4 1.65 03/22/2019    FREET4 1.58 03/27/2018       Lab Results   Component Value Date    WBC 4.41 (L) 09/30/2020    HGB 12.4 (L) 09/30/2020     09/30/2020         Imaging:     LUMBAR SPINE X-RAYS     HISTORY: Back pain with left leg pain.     TECHNIQUE: Five x-rays of the lumbar spine are provided.     FINDINGS: There is bridging enthesophyte anteriorly at the T11-12,  T12-L1, and L3-4 levels. Vertebral body height and alignment are normal  throughout the lumbar spine. Disc height is fairly well preserved. There  is facet arthropathy at L4-5 and L5-S1. The SI joints appear normal. No  bone lesion is identified.     IMPRESSION:  Degenerative change in the lumbar and lower thoracic spine.     This report was finalized on 6/5/2018         Medical Tests:           Summary of old records / correspondence / consultant report:           Request outside records:             *Examiner was wearing KN95 mask during the entire duration of the visit. Patient was masked the entire time.   Minimum social distance of 6 ft maintained entire visit except if physical contact was necessary as documented.     **Dragon Disclaimer:   Much of this encounter note is an electronic transcription/translation of spoken language to printed text. The electronic translation of spoken language may permit erroneous, or at times, nonsensical words or phrases to be inadvertently transcribed. Although I have reviewed the note for such errors, some may still exist.     Template created by Sugey Godfrey MD

## 2021-06-30 NOTE — ASSESSMENT & PLAN NOTE
Continue metformin  mg 2 BID, Farxiga 10 mg and pioglitazone 30 mg qd.   Lab Results   Component Value Date    HGBA1C 6.6 11/23/2020    HGBA1C 6.89 (H) 08/04/2020    HGBA1C 6.8 05/07/2020

## 2021-07-01 LAB
ALBUMIN SERPL-MCNC: 4.7 G/DL (ref 3.5–5.2)
ALBUMIN/CREAT UR: 69 MG/G CREAT (ref 0–29)
ALBUMIN/GLOB SERPL: 2.1 G/DL
ALP SERPL-CCNC: 66 U/L (ref 39–117)
ALT SERPL-CCNC: 13 U/L (ref 1–41)
APPEARANCE UR: CLEAR
AST SERPL-CCNC: 16 U/L (ref 1–40)
BILIRUB SERPL-MCNC: 0.5 MG/DL (ref 0–1.2)
BILIRUB UR QL STRIP: NEGATIVE
BUN SERPL-MCNC: 19 MG/DL (ref 8–23)
BUN/CREAT SERPL: 21.1 (ref 7–25)
CALCIUM SERPL-MCNC: 9.4 MG/DL (ref 8.6–10.5)
CHLORIDE SERPL-SCNC: 104 MMOL/L (ref 98–107)
CHOLEST SERPL-MCNC: 132 MG/DL (ref 0–200)
CHOLEST/HDLC SERPL: 2.93 {RATIO}
CO2 SERPL-SCNC: 27.2 MMOL/L (ref 22–29)
COLOR UR: YELLOW
CREAT SERPL-MCNC: 0.9 MG/DL (ref 0.76–1.27)
CREAT UR-MCNC: 48 MG/DL
GLOBULIN SER CALC-MCNC: 2.2 GM/DL
GLUCOSE SERPL-MCNC: 157 MG/DL (ref 65–99)
GLUCOSE UR QL: ABNORMAL
HBA1C MFR BLD: 7.2 % (ref 4.8–5.6)
HDLC SERPL-MCNC: 45 MG/DL (ref 40–60)
HGB UR QL STRIP: NEGATIVE
KETONES UR QL STRIP: NEGATIVE
LDLC SERPL CALC-MCNC: 60 MG/DL (ref 0–100)
LEUKOCYTE ESTERASE UR QL STRIP: NEGATIVE
MICROALBUMIN UR-MCNC: 33.3 UG/ML
NITRITE UR QL STRIP: NEGATIVE
PH UR STRIP: 5.5 [PH] (ref 5–8)
POTASSIUM SERPL-SCNC: 4.2 MMOL/L (ref 3.5–5.2)
PROT SERPL-MCNC: 6.9 G/DL (ref 6–8.5)
PROT UR QL STRIP: NEGATIVE
SODIUM SERPL-SCNC: 143 MMOL/L (ref 136–145)
SP GR UR: ABNORMAL (ref 1–1.03)
TRIGL SERPL-MCNC: 156 MG/DL (ref 0–150)
UROBILINOGEN UR STRIP-MCNC: ABNORMAL MG/DL
VLDLC SERPL CALC-MCNC: 27 MG/DL (ref 5–40)

## 2021-07-02 DIAGNOSIS — E11.65 TYPE 2 DIABETES MELLITUS WITH HYPERGLYCEMIA, WITHOUT LONG-TERM CURRENT USE OF INSULIN (HCC): Primary | ICD-10-CM

## 2021-07-02 NOTE — ASSESSMENT & PLAN NOTE
Add Januvia 100 mg qAM.     Lab Results   Component Value Date    HGBA1C 7.20 (H) 06/30/2021    HGBA1C 6.6 11/23/2020    HGBA1C 6.89 (H) 08/04/2020

## 2021-07-15 DIAGNOSIS — E11.65 TYPE 2 DIABETES MELLITUS WITH HYPERGLYCEMIA, WITHOUT LONG-TERM CURRENT USE OF INSULIN (HCC): Chronic | ICD-10-CM

## 2021-07-15 RX ORDER — DAPAGLIFLOZIN 10 MG/1
TABLET, FILM COATED ORAL
Qty: 30 TABLET | Refills: 0 | Status: SHIPPED | OUTPATIENT
Start: 2021-07-15 | End: 2021-07-28 | Stop reason: SDUPTHER

## 2021-07-28 DIAGNOSIS — E11.65 TYPE 2 DIABETES MELLITUS WITH HYPERGLYCEMIA, WITHOUT LONG-TERM CURRENT USE OF INSULIN (HCC): Chronic | ICD-10-CM

## 2021-07-28 RX ORDER — DAPAGLIFLOZIN 10 MG/1
1 TABLET, FILM COATED ORAL
Qty: 90 TABLET | Refills: 3 | Status: SHIPPED | OUTPATIENT
Start: 2021-07-28 | End: 2022-08-22

## 2021-09-29 ENCOUNTER — FLU SHOT (OUTPATIENT)
Dept: INTERNAL MEDICINE | Age: 76
End: 2021-09-29

## 2021-09-29 DIAGNOSIS — Z23 NEED FOR INFLUENZA VACCINATION: Primary | ICD-10-CM

## 2021-09-29 PROCEDURE — G0008 ADMIN INFLUENZA VIRUS VAC: HCPCS | Performed by: INTERNAL MEDICINE

## 2021-09-29 PROCEDURE — 90662 IIV NO PRSV INCREASED AG IM: CPT | Performed by: INTERNAL MEDICINE

## 2021-10-27 DIAGNOSIS — I10 ESSENTIAL HYPERTENSION: Chronic | ICD-10-CM

## 2021-10-27 DIAGNOSIS — E11.9 TYPE 2 DIABETES MELLITUS WITHOUT COMPLICATION (HCC): ICD-10-CM

## 2021-10-27 RX ORDER — LISINOPRIL 30 MG/1
TABLET ORAL
Qty: 90 TABLET | Refills: 3 | OUTPATIENT
Start: 2021-10-27

## 2021-11-03 ENCOUNTER — OFFICE VISIT (OUTPATIENT)
Dept: INTERNAL MEDICINE | Age: 76
End: 2021-11-03

## 2021-11-03 VITALS
HEART RATE: 78 BPM | WEIGHT: 203 LBS | DIASTOLIC BLOOD PRESSURE: 74 MMHG | BODY MASS INDEX: 30.07 KG/M2 | OXYGEN SATURATION: 98 % | TEMPERATURE: 97.3 F | HEIGHT: 69 IN | SYSTOLIC BLOOD PRESSURE: 158 MMHG

## 2021-11-03 DIAGNOSIS — E11.65 TYPE 2 DIABETES MELLITUS WITH HYPERGLYCEMIA, WITHOUT LONG-TERM CURRENT USE OF INSULIN (HCC): ICD-10-CM

## 2021-11-03 DIAGNOSIS — I10 PRIMARY HYPERTENSION: Primary | Chronic | ICD-10-CM

## 2021-11-03 DIAGNOSIS — J30.1 SEASONAL ALLERGIC RHINITIS DUE TO POLLEN: ICD-10-CM

## 2021-11-03 LAB
EXPIRATION DATE: NORMAL
HBA1C MFR BLD: 6.8 %
Lab: NORMAL

## 2021-11-03 PROCEDURE — 3044F HG A1C LEVEL LT 7.0%: CPT | Performed by: INTERNAL MEDICINE

## 2021-11-03 PROCEDURE — 99214 OFFICE O/P EST MOD 30 MIN: CPT | Performed by: INTERNAL MEDICINE

## 2021-11-03 PROCEDURE — 83036 HEMOGLOBIN GLYCOSYLATED A1C: CPT | Performed by: INTERNAL MEDICINE

## 2021-11-03 RX ORDER — HYDROCHLOROTHIAZIDE 25 MG/1
25 TABLET ORAL DAILY
Qty: 90 TABLET | Refills: 1 | Status: SHIPPED | OUTPATIENT
Start: 2021-11-03 | End: 2022-02-02 | Stop reason: SDUPTHER

## 2021-11-03 RX ORDER — FLUTICASONE PROPIONATE 50 MCG
2 SPRAY, SUSPENSION (ML) NASAL DAILY
Qty: 16 G | Refills: 11 | Status: SHIPPED | OUTPATIENT
Start: 2021-11-03 | End: 2022-11-16

## 2021-11-03 NOTE — ASSESSMENT & PLAN NOTE
Home blood pressure > 135 SBP consistently.   Advised to decrease sodium intake (eating saltier due to lost of taste since COVID-19 vaccine).   Increase HCTZ to 25 mg qAM.     BP Readings from Last 3 Encounters:   11/03/21 158/74   06/30/21 152/78   03/24/21 140/70

## 2021-11-03 NOTE — PROGRESS NOTES
I N T E R N A L  M E D I C I N E  J U N O H  K I M,  M D      ENCOUNTER DATE:  2021    Bang [place]ian / 76 y.o. / male      CHIEF COMPLAINT / REASON FOR OFFICE VISIT     Diabetes, Hyperlipidemia, and Hypertension      ASSESSMENT & PLAN     Problem List Items Addressed This Visit        High    Type 2 diabetes mellitus with hyperglycemia (HCC) (Chronic)    Current Assessment & Plan     Previously added Januvia 100 mg.   A1c is better at 6.8 today.   Continue all current medications.     Lab Results   Component Value Date    HGBA1C 6.8 2021    HGBA1C 7.20 (H) 2021    HGBA1C 6.6 2020             Relevant Medications    metFORMIN ER (GLUCOPHAGE-XR) 500 MG 24 hr tablet    pioglitazone (ACTOS) 30 MG tablet    glucose blood test strip    SITagliptin (Januvia) 100 MG tablet    Dapagliflozin Propanediol (Farxiga) 10 MG tablet    Other Relevant Orders    POC Glycosylated Hemoglobin (Hb A1C) (Completed)    Hypertension - Primary (Chronic)    Current Assessment & Plan     Home blood pressure > 135 SBP consistently.   Advised to decrease sodium intake (eating saltier due to lost of taste since COVID-19 vaccine).   Increase HCTZ to 25 mg qAM.     BP Readings from Last 3 Encounters:   21 158/74   21 152/78   21 140/70             Relevant Medications    lisinopril (PRINIVIL,ZESTRIL) 40 MG tablet    hydroCHLOROthiazide (HYDRODIURIL) 25 MG tablet       Low    Allergic rhinitis (Chronic)    Relevant Medications    fluticasone (FLONASE) 50 MCG/ACT nasal spray        Orders Placed This Encounter   Procedures   • POC Glycosylated Hemoglobin (Hb A1C)     New Medications Ordered This Visit   Medications   • hydroCHLOROthiazide (HYDRODIURIL) 25 MG tablet     Sig: Take 1 tablet by mouth Daily.     Dispense:  90 tablet     Refill:  1   • fluticasone (FLONASE) 50 MCG/ACT nasal spray     Si sprays by Each Nare route Daily.     Dispense:  16 g     Refill:  11       SUMMARY/DISCUSSION  •       Next  "Appointment with me: Visit date not found    Return in about 3 months (around 2/3/2022) for Reassess chronic medical problems.        VITAL SIGNS     Visit Vitals  /74 (BP Location: Left arm)   Pulse 78   Temp 97.3 °F (36.3 °C)   Ht 175.3 cm (69.02\")   Wt 92.1 kg (203 lb)   SpO2 98%   BMI 29.96 kg/m²       BP Readings from Last 3 Encounters:   11/03/21 158/74   06/30/21 152/78   03/24/21 140/70     Wt Readings from Last 3 Encounters:   11/03/21 92.1 kg (203 lb)   06/30/21 94.3 kg (208 lb)   03/24/21 93.9 kg (207 lb)     Body mass index is 29.96 kg/m².      MEDICATIONS AT THE TIME OF OFFICE VISIT     Current Outpatient Medications on File Prior to Visit   Medication Sig   • atorvastatin (LIPITOR) 20 MG tablet TAKE ONE TABLET BY MOUTH DAILY BEFORE SUPPER   • Dapagliflozin Propanediol (Farxiga) 10 MG tablet Take 10 mg by mouth Every Morning Before Breakfast.   • gabapentin (NEURONTIN) 300 MG capsule TAKE ONE CAPSULE BY MOUTH EVERY NIGHT AT BEDTIME AND TRITRATE UP TO TWO TIMES A DAY AS DIRECTED   • glucose blood (WAVESENSE PRESTO) test strip Check BS once daily   • glucose blood test strip Check daily   • [employer] LANCETS THIN 26G misc Use to test blood sugar one time daily. *PT MUST MAKE APPT FOR FUTURE REFILLS*   • [employer] Lancets Thin 26G misc USE ONE LANCET TO TEST DAILY   • lisinopril (PRINIVIL,ZESTRIL) 40 MG tablet Take 1 tablet by mouth Daily.   • metFORMIN ER (GLUCOPHAGE-XR) 500 MG 24 hr tablet Take 2 tablets by mouth 2 (Two) Times a Day.   • pioglitazone (ACTOS) 30 MG tablet TAKE ONE TABLET BY MOUTH EVERY MORNING   • SITagliptin (Januvia) 100 MG tablet Take 1 tablet by mouth Every Morning.   • timolol (TIMOPTIC) 0.5 % ophthalmic solution    • WAVESENSE PRESTO test strip USE ONE STRIP TO TEST DAILY   • [DISCONTINUED] hydroCHLOROthiazide (HYDRODIURIL) 12.5 MG tablet Take 1 tablet by mouth Daily.   • [DISCONTINUED] fluticasone (FLONASE) 50 MCG/ACT nasal spray 2 sprays by Each Nare route Daily.     No current " facility-administered medications on file prior to visit.         HISTORY OF PRESENT ILLNESS     Above     Lab Results   Component Value Date    HGBA1C 6.8 11/03/2021    HGBA1C 7.20 (H) 06/30/2021    HGBA1C 6.6 11/23/2020    CREATININE 0.90 06/30/2021    LDL 60 06/30/2021    MICROALBUR 33.3 06/30/2021      Wt Readings from Last 3 Encounters:   11/03/21 92.1 kg (203 lb)   06/30/21 94.3 kg (208 lb)   03/24/21 93.9 kg (207 lb)         REVIEW OF SYSTEMS     Constitutional neg except per HPI   Resp neg  CV neg   Neuro neg for headaches   Decrease taste of saltiness       PHYSICAL EXAMINATION     Physical Exam  General: No acute distress  Psych: Normal thought and judgment   Cardiovascular Rate: normal. Rhythm: regular. Heart sounds: normal.   Pulm/Chest: Effort normal, breath sounds normal.       REVIEWED DATA     Labs:       Lab Results   Component Value Date     06/30/2021    K 4.2 06/30/2021    CALCIUM 9.4 06/30/2021    AST 16 06/30/2021    ALT 13 06/30/2021    BUN 19 06/30/2021    CREATININE 0.90 06/30/2021    CREATININE 1.10 09/30/2020    CREATININE 1.00 09/29/2020    EGFRIFNONA 82 06/30/2021    EGFRIFAFRI 100 06/30/2021       Lab Results   Component Value Date    HGBA1C 6.8 11/03/2021    HGBA1C 7.20 (H) 06/30/2021    HGBA1C 6.6 11/23/2020       Lab Results   Component Value Date    LDL 60 06/30/2021    LDL 67 02/05/2020    HDL 45 06/30/2021    TRIG 156 (H) 06/30/2021       Lab Results   Component Value Date    TSH 0.859 03/22/2019    TSH 1.140 03/27/2018    FREET4 1.65 03/22/2019    FREET4 1.58 03/27/2018       Lab Results   Component Value Date    WBC 4.41 (L) 09/30/2020    HGB 12.4 (L) 09/30/2020     09/30/2020       Lab Results   Component Value Date    MICROALBUR 33.3 06/30/2021           Imaging:           Medical Tests:           Summary of old records / correspondence / consultant report:           Request outside records:           *Examiner was wearing KN95 mask and eye protection during the  entire duration of the visit. Patient was masked the entire time. Minimum social distance of 6 ft maintained entire visit except if physical contact was necessary as documented.     **Dragon Disclaimer: Much of this encounter note is an electronic transcription/translation of spoken language to printed text. The electronic translation of spoken language may permit erroneous, or at times, nonsensical words or phrases to be inadvertently transcribed. Although I have reviewed the note for such errors, some may still exist.       Template created by Sugey Godfrey MD

## 2021-11-03 NOTE — ASSESSMENT & PLAN NOTE
Previously added Januvia 100 mg.   A1c is better at 6.8 today.   Continue all current medications.     Lab Results   Component Value Date    HGBA1C 6.8 11/03/2021    HGBA1C 7.20 (H) 06/30/2021    HGBA1C 6.6 11/23/2020

## 2021-12-13 ENCOUNTER — DOCUMENTATION (OUTPATIENT)
Dept: INTERNAL MEDICINE | Age: 76
End: 2021-12-13

## 2021-12-23 DIAGNOSIS — E11.9 TYPE 2 DIABETES MELLITUS WITHOUT COMPLICATION (HCC): ICD-10-CM

## 2021-12-23 RX ORDER — METFORMIN HYDROCHLORIDE 500 MG/1
TABLET, EXTENDED RELEASE ORAL
Qty: 360 TABLET | Refills: 3 | Status: SHIPPED | OUTPATIENT
Start: 2021-12-23 | End: 2022-12-28

## 2022-01-30 DIAGNOSIS — E11.9 TYPE 2 DIABETES MELLITUS WITHOUT COMPLICATION: ICD-10-CM

## 2022-01-30 DIAGNOSIS — I10 ESSENTIAL HYPERTENSION: Chronic | ICD-10-CM

## 2022-01-30 DIAGNOSIS — E11.65 TYPE 2 DIABETES MELLITUS WITH HYPERGLYCEMIA, WITHOUT LONG-TERM CURRENT USE OF INSULIN: Chronic | ICD-10-CM

## 2022-01-31 RX ORDER — GLIMEPIRIDE 1 MG/1
TABLET ORAL
Qty: 90 TABLET | Refills: 3 | Status: SHIPPED | OUTPATIENT
Start: 2022-01-31 | End: 2023-01-26

## 2022-01-31 RX ORDER — HYDROCHLOROTHIAZIDE 12.5 MG/1
TABLET ORAL
Qty: 90 TABLET | Refills: 3 | OUTPATIENT
Start: 2022-01-31

## 2022-01-31 RX ORDER — PIOGLITAZONEHYDROCHLORIDE 30 MG/1
TABLET ORAL
Qty: 90 TABLET | Refills: 3 | Status: SHIPPED | OUTPATIENT
Start: 2022-01-31 | End: 2023-01-26

## 2022-02-02 DIAGNOSIS — I10 PRIMARY HYPERTENSION: Chronic | ICD-10-CM

## 2022-02-02 RX ORDER — HYDROCHLOROTHIAZIDE 25 MG/1
25 TABLET ORAL DAILY
Qty: 90 TABLET | Refills: 3 | Status: SHIPPED | OUTPATIENT
Start: 2022-02-02 | End: 2023-01-26

## 2022-02-02 NOTE — TELEPHONE ENCOUNTER
Caller: Meño Salas    Relationship: Self    Best call back number: [phone]    Requested Prescriptions:   Requested Prescriptions     Pending Prescriptions Disp Refills   • hydroCHLOROthiazide (HYDRODIURIL) 25 MG tablet 90 tablet 1     Sig: Take 1 tablet by mouth Daily.        Pharmacy where request should be sent: [facility] [phone] [phone] [facility]     Additional details provided by patient: PATIENT IS OUT OF THIS MEDICATION    Does the patient have less than a 3 day supply:  [x] Yes  [] No    Mimi Bailey Rep   02/02/22 11:39 EST

## 2022-03-02 ENCOUNTER — OFFICE VISIT (OUTPATIENT)
Dept: INTERNAL MEDICINE | Age: 77
End: 2022-03-02

## 2022-03-02 VITALS
HEART RATE: 77 BPM | HEIGHT: 69 IN | WEIGHT: 213 LBS | OXYGEN SATURATION: 95 % | TEMPERATURE: 97.7 F | SYSTOLIC BLOOD PRESSURE: 140 MMHG | DIASTOLIC BLOOD PRESSURE: 82 MMHG | BODY MASS INDEX: 31.55 KG/M2

## 2022-03-02 DIAGNOSIS — E78.2 MIXED HYPERLIPIDEMIA: Chronic | ICD-10-CM

## 2022-03-02 DIAGNOSIS — R60.1 GENERALIZED EDEMA: Chronic | ICD-10-CM

## 2022-03-02 DIAGNOSIS — I10 PRIMARY HYPERTENSION: Chronic | ICD-10-CM

## 2022-03-02 DIAGNOSIS — E11.65 TYPE 2 DIABETES MELLITUS WITH HYPERGLYCEMIA, WITHOUT LONG-TERM CURRENT USE OF INSULIN: Primary | Chronic | ICD-10-CM

## 2022-03-02 PROCEDURE — 99214 OFFICE O/P EST MOD 30 MIN: CPT | Performed by: INTERNAL MEDICINE

## 2022-03-02 RX ORDER — SEMAGLUTIDE 1.34 MG/ML
0.25 INJECTION, SOLUTION SUBCUTANEOUS WEEKLY
Qty: 1 PEN | Refills: 2 | Status: SHIPPED | OUTPATIENT
Start: 2022-03-02 | End: 2022-07-13 | Stop reason: SINTOL

## 2022-03-02 NOTE — PROGRESS NOTES
I N T E R N A L  M E D I C I N E  J U N O H  K I M,  M D      ENCOUNTER DATE:  03/02/2022    Bang [place]az[place] / 76 y.o. / male      CHIEF COMPLAINT / REASON FOR OFFICE VISIT     Diabetes, Hypertension, and Hyperlipidemia      ASSESSMENT & PLAN     Problem List Items Addressed This Visit        High    Type 2 diabetes mellitus with hyperglycemia (HCC) - Primary (Chronic)    Current Assessment & Plan     Has not been taking Januvia over multiple months.  Patient is interested in GLP-1 therapy for diabetes and weight loss.  Start Ozempic 0.25 mg weekly x1 month then increase to 0.5 mg.  Prescription sent to the pharmacy.  Advised him to watch for severe nausea or abdominal pain.  Follow-up in 3 months for diabetes.    Continue to stay off of Januvia.  Continue glimepiride 1 mg for now but based on blood sugar response will likely discontinue in the near future.  Continue Farxiga 10 mg, Metformin and pioglitazone.    Lab Results   Component Value Date    HGBA1C 6.8 11/03/2021    HGBA1C 7.20 (H) 06/30/2021    HGBA1C 6.6 11/23/2020             Relevant Medications    glucose blood test strip    Dapagliflozin Propanediol (Farxiga) 10 MG tablet    metFORMIN ER (GLUCOPHAGE-XR) 500 MG 24 hr tablet    glimepiride (AMARYL) 1 MG tablet    pioglitazone (ACTOS) 30 MG tablet    Semaglutide,0.25 or 0.5MG/DOS, (Ozempic, 0.25 or 0.5 MG/DOSE,) 2 MG/1.5ML solution pen-injector       Medium    Hyperlipidemia (Chronic)    Overview     Continue atorvastatin 20 mg daily.         Relevant Medications    atorvastatin (LIPITOR) 20 MG tablet    Hypertension (Chronic)    Current Assessment & Plan     BP Readings from Last 3 Encounters:   03/02/22 140/82   11/03/21 158/74   06/30/21 152/78      Continue lisinopril 40 mg and hydrochlorothiazide 25 mg daily         Relevant Medications    lisinopril (PRINIVIL,ZESTRIL) 40 MG tablet    hydroCHLOROthiazide (HYDRODIURIL) 25 MG tablet       Low    Generalized edema (Chronic)        No orders of the defined  "types were placed in this encounter.    New Medications Ordered This Visit   Medications   • Semaglutide,0.25 or 0.5MG/DOS, (Ozempic, 0.25 or 0.5 MG/DOSE,) 2 MG/1.5ML solution pen-injector     Sig: Inject 0.25 mg under the skin into the appropriate area as directed 1 (One) Time Per Week.     Dispense:  1 pen     Refill:  2       SUMMARY/DISCUSSION  •       Next Appointment with me: Visit date not found    Return in about 3 months (around 6/2/2022) for Reassess chronic medical problems.        VITAL SIGNS     Visit Vitals  /82 (BP Location: Left arm)   Pulse 77   Temp 97.7 °F (36.5 °C)   Ht 175.3 cm (69.02\")   Wt 96.6 kg (213 lb)   SpO2 95%   BMI 31.44 kg/m²       BP Readings from Last 3 Encounters:   03/02/22 140/82   11/03/21 158/74   06/30/21 152/78     Wt Readings from Last 3 Encounters:   03/02/22 96.6 kg (213 lb)   11/03/21 92.1 kg (203 lb)   06/30/21 94.3 kg (208 lb)     Body mass index is 31.44 kg/m².    Blood pressure readings recorded on patient flowsheet:  No flowsheet data found.     MEDICATIONS AT THE TIME OF OFFICE VISIT     Current Outpatient Medications on File Prior to Visit   Medication Sig   • atorvastatin (LIPITOR) 20 MG tablet TAKE ONE TABLET BY MOUTH DAILY BEFORE SUPPER   • Dapagliflozin Propanediol (Farxiga) 10 MG tablet Take 10 mg by mouth Every Morning Before Breakfast.   • fluticasone (FLONASE) 50 MCG/ACT nasal spray 2 sprays by Each Nare route Daily.   • glimepiride (AMARYL) 1 MG tablet TAKE ONE TABLET BY MOUTH EVERY MORNING BEFORE BREAKFAST   • glucose blood (WAVESENSE PRESTO) test strip Check BS once daily   • glucose blood test strip Check daily   • hydroCHLOROthiazide (HYDRODIURIL) 25 MG tablet Take 1 tablet by mouth Daily.   • [employer] LANCETS THIN 26G misc Use to test blood sugar one time daily. *PT MUST MAKE APPT FOR FUTURE REFILLS*   • [employer] Lancets Thin 26G misc USE ONE LANCET TO TEST DAILY   • lisinopril (PRINIVIL,ZESTRIL) 40 MG tablet Take 1 tablet by mouth Daily.   • " metFORMIN ER (GLUCOPHAGE-XR) 500 MG 24 hr tablet TAKE TWO TABLETS BY MOUTH TWICE A DAY   • pioglitazone (ACTOS) 30 MG tablet TAKE ONE TABLET BY MOUTH EVERY MORNING   • timolol (TIMOPTIC) 0.5 % ophthalmic solution    • WAVESENSE PRESTO test strip USE ONE STRIP TO TEST DAILY   • gabapentin (NEURONTIN) 300 MG capsule TAKE ONE CAPSULE BY MOUTH EVERY NIGHT AT BEDTIME AND TRITRATE UP TO TWO TIMES A DAY AS DIRECTED   • SITagliptin (Januvia) 100 MG tablet Take 1 tablet by mouth Every Morning.     No current facility-administered medications on file prior to visit.         HISTORY OF PRESENT ILLNESS     Blood sugar readings recorded on patient's flowsheet:  No flowsheet data found.     UTILIZED [place]      Patient is interested in GLP-1 therapy for tighter glucose control and weight loss.  It is noted he has gained about 10 pounds since last visit.  He has not been taking Januvia for several months.  He is on Farxiga 10 mg, Metformin, glimepiride 1 mg and pioglitazone 15 mg.  Denies significant hypoglycemia at this time.  Blood pressure is marginally controlled on lisinopril 40 mg and previously hydrochlorothiazide was decreased to 25 mg.  Takes atorvastatin 20 mg for hyperlipidemia.       Wt Readings from Last 3 Encounters:   03/02/22 96.6 kg (213 lb)   11/03/21 92.1 kg (203 lb)   06/30/21 94.3 kg (208 lb)       REVIEW OF SYSTEMS     Constitutional neg except per HPI   Resp neg  CV neg   GI negative   negative       PHYSICAL EXAMINATION     Physical Exam  General: No acute distress. Weight gain noted.   Psych: Normal thought and judgment          REVIEWED DATA     Labs:       Lab Results   Component Value Date     06/30/2021    K 4.2 06/30/2021    CALCIUM 9.4 06/30/2021    AST 16 06/30/2021    ALT 13 06/30/2021    BUN 19 06/30/2021    CREATININE 0.90 06/30/2021    CREATININE 1.10 09/30/2020    CREATININE 1.00 09/29/2020    EGFRIFNONA 82 06/30/2021    EGFRIFAFRI 100 06/30/2021       Lab Results   Component  Value Date    HGBA1C 6.8 11/03/2021    HGBA1C 7.20 (H) 06/30/2021    HGBA1C 6.6 11/23/2020       Lab Results   Component Value Date    LDL 60 06/30/2021    LDL 67 02/05/2020    HDL 45 06/30/2021    TRIG 156 (H) 06/30/2021       Lab Results   Component Value Date    TSH 0.859 03/22/2019    TSH 1.140 03/27/2018    FREET4 1.65 03/22/2019    FREET4 1.58 03/27/2018       Lab Results   Component Value Date    WBC 4.41 (L) 09/30/2020    HGB 12.4 (L) 09/30/2020     09/30/2020       Lab Results   Component Value Date    MICROALBUR 33.3 06/30/2021           Imaging:           Medical Tests:   Stress echo 3/4/2020  · Normal stress echo with no significant echocardiographic evidence for myocardial ischemia.  · Left ventricular systolic function is normal. Calculated EF = 56%. Estimated EF was in agreement with the calculated EF. Normal left ventricular cavity size and wall thickness noted. All left ventricular wall segments contract normally. Left ventricular diastolic function is normal.          Summary of old records / correspondence / consultant report:           Request outside records:           *Examiner was wearing KN95 mask and eye protection during the entire duration of the visit. Patient was masked the entire time. Minimum social distance of 6 ft maintained entire visit except if physical contact was necessary as documented.       Template created by Sugey Godfrey MD

## 2022-03-02 NOTE — ASSESSMENT & PLAN NOTE
BP Readings from Last 3 Encounters:   03/02/22 140/82   11/03/21 158/74   06/30/21 152/78      Continue lisinopril 40 mg and hydrochlorothiazide 25 mg daily

## 2022-03-02 NOTE — ASSESSMENT & PLAN NOTE
Has not been taking Januvia over multiple months.  Patient is interested in GLP-1 therapy for diabetes and weight loss.  Start Ozempic 0.25 mg weekly x1 month then increase to 0.5 mg.  Prescription sent to the pharmacy.  Advised him to watch for severe nausea or abdominal pain.  Follow-up in 3 months for diabetes.    Continue to stay off of Januvia.  Continue glimepiride 1 mg for now but based on blood sugar response will likely discontinue in the near future.  Continue Farxiga 10 mg, Metformin and pioglitazone.    Lab Results   Component Value Date    HGBA1C 6.8 11/03/2021    HGBA1C 7.20 (H) 06/30/2021    HGBA1C 6.6 11/23/2020

## 2022-06-16 ENCOUNTER — OFFICE VISIT (OUTPATIENT)
Dept: INTERNAL MEDICINE | Age: 77
End: 2022-06-16

## 2022-06-16 VITALS
HEART RATE: 73 BPM | WEIGHT: 210 LBS | OXYGEN SATURATION: 95 % | TEMPERATURE: 97.8 F | DIASTOLIC BLOOD PRESSURE: 74 MMHG | BODY MASS INDEX: 31.1 KG/M2 | HEIGHT: 69 IN | SYSTOLIC BLOOD PRESSURE: 134 MMHG

## 2022-06-16 DIAGNOSIS — I10 PRIMARY HYPERTENSION: Chronic | ICD-10-CM

## 2022-06-16 DIAGNOSIS — E78.2 MIXED HYPERLIPIDEMIA: ICD-10-CM

## 2022-06-16 DIAGNOSIS — Z12.11 SCREENING FOR COLON CANCER: ICD-10-CM

## 2022-06-16 DIAGNOSIS — Z51.81 THERAPEUTIC DRUG MONITORING: ICD-10-CM

## 2022-06-16 DIAGNOSIS — E11.65 TYPE 2 DIABETES MELLITUS WITH HYPERGLYCEMIA, WITHOUT LONG-TERM CURRENT USE OF INSULIN: Primary | ICD-10-CM

## 2022-06-16 PROCEDURE — 99214 OFFICE O/P EST MOD 30 MIN: CPT | Performed by: INTERNAL MEDICINE

## 2022-06-16 NOTE — PROGRESS NOTES
I N T E R N A L  M E D I C I N E  J U N O H  K I M,  M D      ENCOUNTER DATE:  06/16/2022    Meño Bengali / 77 y.o. / male      CHIEF COMPLAINT / REASON FOR OFFICE VISIT     Diabetes, Hyperlipidemia, and Hypertension      ASSESSMENT & PLAN     Problem List Items Addressed This Visit        High    Type 2 diabetes mellitus with hyperglycemia (HCC) - Primary (Chronic)    Current Assessment & Plan     He was not able to tolerate Ozempic due to nausea.  He was willing to try Rybelsus 3 mg every morning.  Sample provided.    Continue pioglitazone 30 mg, metformin  mg 2 tablets twice daily and glimepiride 1 mg every morning.           Relevant Medications    glucose blood test strip    Dapagliflozin Propanediol (Farxiga) 10 MG tablet    metFORMIN ER (GLUCOPHAGE-XR) 500 MG 24 hr tablet    glimepiride (AMARYL) 1 MG tablet    pioglitazone (ACTOS) 30 MG tablet    Semaglutide,0.25 or 0.5MG/DOS, (Ozempic, 0.25 or 0.5 MG/DOSE,) 2 MG/1.5ML solution pen-injector    Other Relevant Orders    Comprehensive Metabolic Panel    Hemoglobin A1c    CBC & Differential       Medium    Hyperlipidemia (Chronic)    Overview     Continue atorvastatin 20 mg daily.           Relevant Medications    atorvastatin (LIPITOR) 20 MG tablet    Other Relevant Orders    Lipid Panel With / Chol / HDL Ratio    Hypertension (Chronic)    Overview     Continue lisinopril 40 mg and hydrochlorothiazide 25 mg daily           Relevant Medications    lisinopril (PRINIVIL,ZESTRIL) 40 MG tablet    hydroCHLOROthiazide (HYDRODIURIL) 25 MG tablet      Other Visit Diagnoses     Screening for colon cancer        Relevant Orders    Cologuard - Stool, Per Rectum    Therapeutic drug monitoring        Relevant Orders    Compliance Drug Analysis, Ur - Urine, Clean Catch        Orders Placed This Encounter   Procedures   • Compliance Drug Analysis, Ur - Urine, Clean Catch   • Comprehensive Metabolic Panel   • Hemoglobin A1c   • Lipid Panel With / Chol / HDL Ratio   •  "Cologuard - Stool, Per Rectum   • CBC & Differential     No orders of the defined types were placed in this encounter.      SUMMARY/DISCUSSION  •       Next Appointment with me: Visit date not found    Return in about 4 months (around 10/16/2022) for Diabetes.        VITAL SIGNS     Vitals:    06/16/22 0926   BP: 134/74   BP Location: Left arm   Pulse: 73   Temp: 97.8 °F (36.6 °C)   SpO2: 95%   Weight: 95.3 kg (210 lb)   Height: 175.3 cm (69.02\")       BP Readings from Last 3 Encounters:   06/16/22 134/74   03/02/22 140/82   11/03/21 158/74     Wt Readings from Last 3 Encounters:   06/16/22 95.3 kg (210 lb)   03/02/22 96.6 kg (213 lb)   11/03/21 92.1 kg (203 lb)     Body mass index is 30.99 kg/m².    Blood pressure readings recorded on patient flowsheet:  No flowsheet data found.     MEDICATIONS AT THE TIME OF OFFICE VISIT     Current Outpatient Medications on File Prior to Visit   Medication Sig   • atorvastatin (LIPITOR) 20 MG tablet TAKE ONE TABLET BY MOUTH DAILY BEFORE SUPPER   • Dapagliflozin Propanediol (Farxiga) 10 MG tablet Take 10 mg by mouth Every Morning Before Breakfast.   • fluticasone (FLONASE) 50 MCG/ACT nasal spray 2 sprays by Each Nare route Daily.   • gabapentin (NEURONTIN) 300 MG capsule TAKE ONE CAPSULE BY MOUTH EVERY NIGHT AT BEDTIME AND TRITRATE UP TO TWO TIMES A DAY AS DIRECTED   • glimepiride (AMARYL) 1 MG tablet TAKE ONE TABLET BY MOUTH EVERY MORNING BEFORE BREAKFAST   • glucose blood test strip Check BS once daily   • glucose blood test strip Check daily   • hydroCHLOROthiazide (HYDRODIURIL) 25 MG tablet Take 1 tablet by mouth Daily.   • [employer] LANCETS THIN 26G misc Use to test blood sugar one time daily. *PT MUST MAKE APPT FOR FUTURE REFILLS*   • [employer] Lancets Thin 26G misc USE ONE LANCET TO TEST DAILY   • lisinopril (PRINIVIL,ZESTRIL) 40 MG tablet Take 1 tablet by mouth Daily.   • metFORMIN ER (GLUCOPHAGE-XR) 500 MG 24 hr tablet TAKE TWO TABLETS BY MOUTH TWICE A DAY   • pioglitazone " (ACTOS) 30 MG tablet TAKE ONE TABLET BY MOUTH EVERY MORNING   • timolol (TIMOPTIC) 0.5 % ophthalmic solution    • [employer] test strip USE ONE STRIP TO TEST DAILY   He took the medicine    HISTORY OF PRESENT ILLNESS     Blood sugar readings recorded on patient's flowsheet:  No flowsheet data found.     Unfortunately he was not able to tolerate Ozempic due to nausea.  He is willing to try Rybelsus.  Most recent A1c level was 6.8 in November.  He is on glimepiride 1 mg, metformin and pioglitazone.  Hypertension remains stable on lisinopril and hydrochlorothiazide 25 mg.  He is on atorvastatin 20 mg for hyperlipidemia without problems.  His BMI is 30.99.       Lab Results   Component Value Date    HGBA1C 6.8 11/03/2021    HGBA1C 7.20 (H) 06/30/2021    HGBA1C 6.6 11/23/2020    CREATININE 0.90 06/30/2021    LDL 60 06/30/2021    MALBCRERATIO 69 (H) 06/30/2021        95.3 kg (210 lb)    REVIEW OF SYSTEMS     Constitutional neg except per HPI   Resp neg  CV neg   Nausea bowel on Ozempic  Chronic edema bilateral lower extremities, not wearing compression hose stockings      PHYSICAL EXAMINATION     Physical Exam  No acute distress  Heart sounds are normal, 1+ bilateral lower extremity edema  Lungs clear to auscultation      REVIEWED DATA     Labs:       Lab Results   Component Value Date     06/30/2021    K 4.2 06/30/2021    CALCIUM 9.4 06/30/2021    AST 16 06/30/2021    ALT 13 06/30/2021    BUN 19 06/30/2021    CREATININE 0.90 06/30/2021    CREATININE 1.10 09/30/2020    CREATININE 1.00 09/29/2020    EGFRIFNONA 82 06/30/2021    EGFRIFAFRI 100 06/30/2021       Lab Results   Component Value Date    HGBA1C 6.8 11/03/2021    HGBA1C 7.20 (H) 06/30/2021    HGBA1C 6.6 11/23/2020       Lab Results   Component Value Date    LDL 60 06/30/2021    LDL 67 02/05/2020    LDL 66 05/14/2019    HDL 45 06/30/2021    HDL 50 02/05/2020    TRIG 156 (H) 06/30/2021    TRIG 121 02/05/2020       Lab Results   Component Value Date    TSH  0.859 03/22/2019    TSH 1.140 03/27/2018    FREET4 1.65 03/22/2019    FREET4 1.58 03/27/2018       Lab Results   Component Value Date    WBC 4.41 (L) 09/30/2020    HGB 12.4 (L) 09/30/2020     09/30/2020       Lab Results   Component Value Date    MALBCRERATIO 69 (H) 06/30/2021          Imaging:           Medical Tests:           Summary of old records / correspondence / consultant report:           Request outside records:           *Examiner was wearing KN95 mask and eye protection during the entire duration of the visit. Patient was masked the entire time. Minimum social distance of 6 ft maintained entire visit except if physical contact was necessary as documented.       Template created by Sugey Godfrey MD

## 2022-06-16 NOTE — PATIENT INSTRUCTIONS
** IMPORTANT MESSAGE FROM DR. JONES **    In our office, your satisfaction is VERY important to us.     You may receive a survey from [employer] by mail or E-mail for you to provide feedback about your visit. This information is invaluable for me to know what we can do to improve our services.     I ask that you please take a few minutes to complete the survey and let us know how we are doing in serving your needs. (You may receive the survey more than once for multiple visits)    Thank You !    Dr. Jones    _________________________________________________________________________________________________________________________      ** ADDITIONAL INSTRUCTION / REMINDERS FROM DR. JONES **    OBTAIN THESE VACCINES AT THE PHARMACY:  Shingrix (shingles), Hepatitis A Series, Td, and COVID-19 booster shot

## 2022-06-17 LAB
ALBUMIN SERPL-MCNC: 4.6 G/DL (ref 3.7–4.7)
ALBUMIN/GLOB SERPL: 1.7 {RATIO} (ref 1.2–2.2)
ALP SERPL-CCNC: 70 IU/L (ref 44–121)
ALT SERPL-CCNC: 17 IU/L (ref 0–44)
AST SERPL-CCNC: 24 IU/L (ref 0–40)
BASOPHILS # BLD AUTO: 0 X10E3/UL (ref 0–0.2)
BASOPHILS NFR BLD AUTO: 1 %
BILIRUB SERPL-MCNC: 0.6 MG/DL (ref 0–1.2)
BUN SERPL-MCNC: 20 MG/DL (ref 8–27)
BUN/CREAT SERPL: 22 (ref 10–24)
CALCIUM SERPL-MCNC: 9.7 MG/DL (ref 8.6–10.2)
CHLORIDE SERPL-SCNC: 100 MMOL/L (ref 96–106)
CHOLEST SERPL-MCNC: 125 MG/DL (ref 100–199)
CHOLEST/HDLC SERPL: 3 RATIO (ref 0–5)
CO2 SERPL-SCNC: 25 MMOL/L (ref 20–29)
CREAT SERPL-MCNC: 0.91 MG/DL (ref 0.76–1.27)
EGFRCR SERPLBLD CKD-EPI 2021: 88 ML/MIN/1.73
EOSINOPHIL # BLD AUTO: 0.3 X10E3/UL (ref 0–0.4)
EOSINOPHIL NFR BLD AUTO: 5 %
ERYTHROCYTE [DISTWIDTH] IN BLOOD BY AUTOMATED COUNT: 13.2 % (ref 11.6–15.4)
GLOBULIN SER CALC-MCNC: 2.7 G/DL (ref 1.5–4.5)
GLUCOSE SERPL-MCNC: 109 MG/DL (ref 65–99)
HBA1C MFR BLD: 6.9 % (ref 4.8–5.6)
HCT VFR BLD AUTO: 42.4 % (ref 37.5–51)
HDLC SERPL-MCNC: 42 MG/DL
HGB BLD-MCNC: 14.1 G/DL (ref 13–17.7)
IMM GRANULOCYTES # BLD AUTO: 0 X10E3/UL (ref 0–0.1)
IMM GRANULOCYTES NFR BLD AUTO: 0 %
LDLC SERPL CALC-MCNC: 53 MG/DL (ref 0–99)
LYMPHOCYTES # BLD AUTO: 1.5 X10E3/UL (ref 0.7–3.1)
LYMPHOCYTES NFR BLD AUTO: 33 %
MCH RBC QN AUTO: 29.9 PG (ref 26.6–33)
MCHC RBC AUTO-ENTMCNC: 33.3 G/DL (ref 31.5–35.7)
MCV RBC AUTO: 90 FL (ref 79–97)
MONOCYTES # BLD AUTO: 0.5 X10E3/UL (ref 0.1–0.9)
MONOCYTES NFR BLD AUTO: 11 %
NEUTROPHILS # BLD AUTO: 2.4 X10E3/UL (ref 1.4–7)
NEUTROPHILS NFR BLD AUTO: 50 %
PLATELET # BLD AUTO: 183 X10E3/UL (ref 150–450)
POTASSIUM SERPL-SCNC: 4.1 MMOL/L (ref 3.5–5.2)
PROT SERPL-MCNC: 7.3 G/DL (ref 6–8.5)
RBC # BLD AUTO: 4.72 X10E6/UL (ref 4.14–5.8)
SODIUM SERPL-SCNC: 139 MMOL/L (ref 134–144)
TRIGL SERPL-MCNC: 180 MG/DL (ref 0–149)
VLDLC SERPL CALC-MCNC: 30 MG/DL (ref 5–40)
WBC # BLD AUTO: 4.7 X10E3/UL (ref 3.4–10.8)

## 2022-06-17 NOTE — ASSESSMENT & PLAN NOTE
He was not able to tolerate Ozempic due to nausea.  He was willing to try Rybelsus 3 mg every morning.  Sample provided.    Continue pioglitazone 30 mg, metformin  mg 2 tablets twice daily and glimepiride 1 mg every morning.

## 2022-06-21 NOTE — PROGRESS NOTES
Send result letter to patient.    Here are your recent lab results:    1) A1c level for diabetes is slightly higher.  Start Rybelsus as planned.  2) cholesterol overall remains stable  3) kidney and liver function tests are within normal  4) CBC complete blood cell count is within normal    Please call my office if you have any questions. Otherwise, we can discuss the results in further detail on your next visit.

## 2022-06-26 DIAGNOSIS — E78.2 MIXED HYPERLIPIDEMIA: Chronic | ICD-10-CM

## 2022-06-26 DIAGNOSIS — I10 ESSENTIAL HYPERTENSION: Chronic | ICD-10-CM

## 2022-06-27 RX ORDER — LISINOPRIL 40 MG/1
TABLET ORAL
Qty: 90 TABLET | Refills: 0 | Status: SHIPPED | OUTPATIENT
Start: 2022-06-27 | End: 2022-10-04 | Stop reason: SDUPTHER

## 2022-06-27 RX ORDER — ATORVASTATIN CALCIUM 20 MG/1
TABLET, FILM COATED ORAL
Qty: 90 TABLET | Refills: 0 | Status: SHIPPED | OUTPATIENT
Start: 2022-06-27 | End: 2022-11-01 | Stop reason: SDUPTHER

## 2022-06-28 LAB — DRUGS UR: NORMAL

## 2022-07-13 DIAGNOSIS — E11.65 TYPE 2 DIABETES MELLITUS WITH HYPERGLYCEMIA, WITHOUT LONG-TERM CURRENT USE OF INSULIN: Primary | ICD-10-CM

## 2022-07-13 RX ORDER — ORAL SEMAGLUTIDE 7 MG/1
7 TABLET ORAL
Qty: 30 TABLET | Refills: 0 | Status: SHIPPED | OUTPATIENT
Start: 2022-07-13 | End: 2022-08-10 | Stop reason: SDUPTHER

## 2022-07-13 NOTE — ASSESSMENT & PLAN NOTE
Patient notified me that he is tolerating Rybelsus 3 mg without significant problems.  Sent in the prescription for 7 mg dose #30 no refill.  I advised him that the plan is to increase this to 14 mg in 1 month if he has no difficulties.

## 2022-08-10 DIAGNOSIS — E11.65 TYPE 2 DIABETES MELLITUS WITH HYPERGLYCEMIA, WITHOUT LONG-TERM CURRENT USE OF INSULIN: ICD-10-CM

## 2022-08-10 RX ORDER — ORAL SEMAGLUTIDE 7 MG/1
7 TABLET ORAL
Qty: 90 TABLET | Refills: 1 | Status: SHIPPED | OUTPATIENT
Start: 2022-08-10 | End: 2022-08-11

## 2022-08-11 DIAGNOSIS — E11.65 TYPE 2 DIABETES MELLITUS WITH HYPERGLYCEMIA, WITHOUT LONG-TERM CURRENT USE OF INSULIN: ICD-10-CM

## 2022-08-11 RX ORDER — ORAL SEMAGLUTIDE 7 MG/1
TABLET ORAL
Qty: 30 TABLET | Refills: 3 | Status: SHIPPED | OUTPATIENT
Start: 2022-08-11 | End: 2022-10-12 | Stop reason: SINTOL

## 2022-08-21 DIAGNOSIS — E11.65 TYPE 2 DIABETES MELLITUS WITH HYPERGLYCEMIA, WITHOUT LONG-TERM CURRENT USE OF INSULIN: Chronic | ICD-10-CM

## 2022-08-22 RX ORDER — DAPAGLIFLOZIN 10 MG/1
TABLET, FILM COATED ORAL
Qty: 90 TABLET | Refills: 3 | Status: SHIPPED | OUTPATIENT
Start: 2022-08-22

## 2022-10-04 DIAGNOSIS — I10 ESSENTIAL HYPERTENSION: Chronic | ICD-10-CM

## 2022-10-04 RX ORDER — LISINOPRIL 40 MG/1
40 TABLET ORAL DAILY
Qty: 90 TABLET | Refills: 3 | Status: SHIPPED | OUTPATIENT
Start: 2022-10-04 | End: 2022-10-06 | Stop reason: SDUPTHER

## 2022-10-04 NOTE — TELEPHONE ENCOUNTER
Caller: Meño Salas    Relationship: Self    Best call back number: [phone]    Requested Prescriptions:   Requested Prescriptions     Pending Prescriptions Disp Refills   • lisinopril (PRINIVIL,ZESTRIL) 40 MG tablet 90 tablet 0     Sig: Take 1 tablet by mouth Daily.        Pharmacy where request should be sent: [facility] [id number] [place] [id number] [facility] [phone] [facility] [phone] FX     Additional details provided by patient: PATIENT HAS A 2 DAY SUPPLY    Does the patient have less than a 3 day supply:  [x] Yes  [] No    Mimi Baxter Rep   10/04/22 14:45 EDT

## 2022-10-06 DIAGNOSIS — I10 ESSENTIAL HYPERTENSION: Chronic | ICD-10-CM

## 2022-10-06 RX ORDER — LISINOPRIL 40 MG/1
40 TABLET ORAL DAILY
Qty: 90 TABLET | Refills: 3 | Status: SHIPPED | OUTPATIENT
Start: 2022-10-06

## 2022-10-12 ENCOUNTER — DOCUMENTATION (OUTPATIENT)
Dept: INTERNAL MEDICINE | Age: 77
End: 2022-10-12

## 2022-10-12 DIAGNOSIS — E11.65 TYPE 2 DIABETES MELLITUS WITH HYPERGLYCEMIA, WITHOUT LONG-TERM CURRENT USE OF INSULIN: Primary | ICD-10-CM

## 2022-10-12 RX ORDER — ORAL SEMAGLUTIDE 3 MG/1
3 TABLET ORAL
Qty: 30 TABLET | Refills: 2 | Status: SHIPPED | OUTPATIENT
Start: 2022-10-12 | End: 2023-01-16

## 2022-11-01 DIAGNOSIS — E78.2 MIXED HYPERLIPIDEMIA: Chronic | ICD-10-CM

## 2022-11-01 RX ORDER — ATORVASTATIN CALCIUM 20 MG/1
20 TABLET, FILM COATED ORAL
Qty: 90 TABLET | Refills: 3 | Status: SHIPPED | OUTPATIENT
Start: 2022-11-01

## 2022-11-01 NOTE — TELEPHONE ENCOUNTER
Lov 6/16/2022  Nov Visit date not found    pt left with out being seen . Insurance do not have your name listed as PCP

## 2022-11-16 DIAGNOSIS — J30.1 SEASONAL ALLERGIC RHINITIS DUE TO POLLEN: ICD-10-CM

## 2022-11-16 RX ORDER — FLUTICASONE PROPIONATE 50 MCG
SPRAY, SUSPENSION (ML) NASAL
Qty: 16 ML | Refills: 3 | Status: SHIPPED | OUTPATIENT
Start: 2022-11-16 | End: 2022-11-22 | Stop reason: SDUPTHER

## 2022-11-22 ENCOUNTER — DOCUMENTATION (OUTPATIENT)
Dept: INTERNAL MEDICINE | Age: 77
End: 2022-11-22

## 2022-11-22 DIAGNOSIS — J30.1 SEASONAL ALLERGIC RHINITIS DUE TO POLLEN: Primary | ICD-10-CM

## 2022-11-22 RX ORDER — FLUTICASONE PROPIONATE 50 MCG
2 SPRAY, SUSPENSION (ML) NASAL DAILY
Qty: 16 ML | Refills: 11 | Status: SHIPPED | OUTPATIENT
Start: 2022-11-22

## 2022-12-28 DIAGNOSIS — E11.9 TYPE 2 DIABETES MELLITUS WITHOUT COMPLICATION: ICD-10-CM

## 2022-12-28 RX ORDER — METFORMIN HYDROCHLORIDE 500 MG/1
TABLET, EXTENDED RELEASE ORAL
Qty: 360 TABLET | Refills: 0 | Status: SHIPPED | OUTPATIENT
Start: 2022-12-28

## 2023-01-16 DIAGNOSIS — E11.65 TYPE 2 DIABETES MELLITUS WITH HYPERGLYCEMIA, WITHOUT LONG-TERM CURRENT USE OF INSULIN: ICD-10-CM

## 2023-01-16 RX ORDER — CLOTRIMAZOLE 1 %
1 CREAM (GRAM) TOPICAL 2 TIMES DAILY
Qty: 60 G | Refills: 2 | Status: SHIPPED | OUTPATIENT
Start: 2023-01-16 | End: 2023-02-09 | Stop reason: SDUPTHER

## 2023-01-16 RX ORDER — ORAL SEMAGLUTIDE 3 MG/1
TABLET ORAL
Qty: 90 TABLET | Refills: 1 | Status: SHIPPED | OUTPATIENT
Start: 2023-01-16 | End: 2023-02-09 | Stop reason: DRUGHIGH

## 2023-01-26 DIAGNOSIS — E11.65 TYPE 2 DIABETES MELLITUS WITH HYPERGLYCEMIA, WITHOUT LONG-TERM CURRENT USE OF INSULIN: Chronic | ICD-10-CM

## 2023-01-26 DIAGNOSIS — I10 PRIMARY HYPERTENSION: Chronic | ICD-10-CM

## 2023-01-26 DIAGNOSIS — E11.9 TYPE 2 DIABETES MELLITUS WITHOUT COMPLICATION: ICD-10-CM

## 2023-01-26 RX ORDER — PIOGLITAZONEHYDROCHLORIDE 30 MG/1
TABLET ORAL
Qty: 90 TABLET | Refills: 3 | Status: SHIPPED | OUTPATIENT
Start: 2023-01-26

## 2023-01-26 RX ORDER — HYDROCHLOROTHIAZIDE 25 MG/1
TABLET ORAL
Qty: 90 TABLET | Refills: 3 | Status: SHIPPED | OUTPATIENT
Start: 2023-01-26

## 2023-01-26 RX ORDER — GLIMEPIRIDE 1 MG/1
TABLET ORAL
Qty: 90 TABLET | Refills: 3 | Status: SHIPPED | OUTPATIENT
Start: 2023-01-26

## 2023-02-09 ENCOUNTER — OFFICE VISIT (OUTPATIENT)
Dept: INTERNAL MEDICINE | Age: 78
End: 2023-02-09
Payer: MEDICARE

## 2023-02-09 VITALS
TEMPERATURE: 97.5 F | HEIGHT: 69 IN | WEIGHT: 214 LBS | OXYGEN SATURATION: 96 % | BODY MASS INDEX: 31.7 KG/M2 | DIASTOLIC BLOOD PRESSURE: 70 MMHG | SYSTOLIC BLOOD PRESSURE: 138 MMHG | HEART RATE: 73 BPM

## 2023-02-09 DIAGNOSIS — E11.65 TYPE 2 DIABETES MELLITUS WITH HYPERGLYCEMIA, WITHOUT LONG-TERM CURRENT USE OF INSULIN: Primary | Chronic | ICD-10-CM

## 2023-02-09 DIAGNOSIS — I10 PRIMARY HYPERTENSION: Chronic | ICD-10-CM

## 2023-02-09 DIAGNOSIS — E78.2 MIXED HYPERLIPIDEMIA: Chronic | ICD-10-CM

## 2023-02-09 PROCEDURE — 99214 OFFICE O/P EST MOD 30 MIN: CPT | Performed by: INTERNAL MEDICINE

## 2023-02-09 RX ORDER — ORAL SEMAGLUTIDE 7 MG/1
7 TABLET ORAL
Qty: 30 TABLET | Refills: 5 | Status: SHIPPED | OUTPATIENT
Start: 2023-02-09

## 2023-02-09 RX ORDER — CLOTRIMAZOLE 1 %
1 CREAM (GRAM) TOPICAL 2 TIMES DAILY
Qty: 60 G | Refills: 2 | Status: SHIPPED | OUTPATIENT
Start: 2023-02-09

## 2023-02-09 NOTE — PROGRESS NOTES
I N T E R N A L  M E D I C I N E    J U N O H  K I M,  M D      ENCOUNTER DATE:  02/09/2023    Bang Hebrew / 77 y.o. / male    CHIEF COMPLAINT / REASON FOR OFFICE VISIT     Diabetes, Hypertension, and Hyperlipidemia      ASSESSMENT & PLAN     Problem List Items Addressed This Visit        High    Type 2 diabetes mellitus with hyperglycemia (HCC) - Primary (Chronic)    Current Assessment & Plan     Advised to take glimepiride 1 mg in the morning (was taking at night).     Increase Rybelsus to 7 mg qAM.     Continue metformin  mg two BID, pioglitazone 30 mg, and Farxiga 10 mg qAM.     Check A1c today          Relevant Medications    glucose blood test strip    Farxiga 10 MG tablet    metFORMIN ER (GLUCOPHAGE-XR) 500 MG 24 hr tablet    glimepiride (AMARYL) 1 MG tablet    pioglitazone (ACTOS) 30 MG tablet    Semaglutide (Rybelsus) 7 MG tablet    Other Relevant Orders    Comprehensive Metabolic Panel    Hemoglobin A1c       Medium    Hyperlipidemia (Chronic)    Overview     Continue atorvastatin 20 mg daily.         Relevant Medications    atorvastatin (LIPITOR) 20 MG tablet    Hypertension (Chronic)    Overview     Continue lisinopril 40 mg and hydrochlorothiazide 25 mg daily         Relevant Medications    lisinopril (PRINIVIL,ZESTRIL) 40 MG tablet    hydroCHLOROthiazide (HYDRODIURIL) 25 MG tablet     Orders Placed This Encounter   Procedures   • Comprehensive Metabolic Panel   • Hemoglobin A1c     New Medications Ordered This Visit   Medications   • clotrimazole (LOTRIMIN) 1 % cream     Sig: Apply 1 application topically to the appropriate area as directed 2 (Two) Times a Day.     Dispense:  60 g     Refill:  2   • Semaglutide (Rybelsus) 7 MG tablet     Sig: Take 7 mg by mouth Daily With Breakfast.     Dispense:  30 tablet     Refill:  5       SUMMARY/DISCUSSION  •       Next Appointment with me: Visit date not found    Return in about 4 months (around 6/9/2023) for Diabetes, Hypertension,  "Hyperlipidemia.        VITAL SIGNS     Vitals:    02/09/23 1338   BP: 138/70   Pulse: 73   Temp: 97.5 °F (36.4 °C)   SpO2: 96%   Weight: 97.1 kg (214 lb)   Height: 175.3 cm (69.02\")       BP Readings from Last 3 Encounters:   02/09/23 138/70   06/16/22 134/74   03/02/22 140/82     Wt Readings from Last 3 Encounters:   02/09/23 97.1 kg (214 lb)   06/16/22 95.3 kg (210 lb)   03/02/22 96.6 kg (213 lb)     Body mass index is 31.58 kg/m².    Blood pressure readings recorded on patient flowsheet:  No flowsheet data found.     MEDICATIONS AT THE TIME OF OFFICE VISIT     Current Outpatient Medications on File Prior to Visit   Medication Sig   • atorvastatin (LIPITOR) 20 MG tablet Take 1 tablet by mouth Daily Before Supper.   • Farxiga 10 MG tablet TAKE ONE TABLET BY MOUTH EVERY MORNING BEFORE BREAKFAST   • fluticasone (FLONASE) 50 MCG/ACT nasal spray 2 sprays by Each Nare route Daily.   • gabapentin (NEURONTIN) 300 MG capsule TAKE ONE CAPSULE BY MOUTH EVERY NIGHT AT BEDTIME AND TRITRATE UP TO TWO TIMES A DAY AS DIRECTED   • glimepiride (AMARYL) 1 MG tablet TAKE ONE TABLET BY MOUTH EVERY MORNING BEFORE BREAKFAST   • glucose blood test strip Check BS once daily   • glucose blood test strip Check daily   • hydroCHLOROthiazide (HYDRODIURIL) 25 MG tablet TAKE ONE TABLET BY MOUTH DAILY   • [employer] LANCETS THIN 26G misc Use to test blood sugar one time daily. *PT MUST MAKE APPT FOR FUTURE REFILLS*   • [employer] Lancets Thin 26G misc USE ONE LANCET TO TEST DAILY   • lisinopril (PRINIVIL,ZESTRIL) 40 MG tablet Take 1 tablet by mouth Daily.   • metFORMIN ER (GLUCOPHAGE-XR) 500 MG 24 hr tablet TAKE TWO TABLETS BY MOUTH TWICE A DAY   • timolol (TIMOPTIC) 0.5 % ophthalmic solution    • [employer] PRESTO test strip USE ONE STRIP TO TEST DAILY   • [DISCONTINUED] clotrimazole (LOTRIMIN) 1 % cream Apply 1 application topically to the appropriate area as directed 2 (Two) Times a Day.   • [DISCONTINUED] Rybelsus 3 MG tablet TAKE ONE TABLET BY MOUTH " EVERY MORNING BEFORE BREAKFAST   • pioglitazone (ACTOS) 30 MG tablet TAKE ONE TABLET BY MOUTH EVERY MORNING     No current facility-administered medications on file prior to visit.         HISTORY OF PRESENT ILLNESS     Was taking glimepiride 1 mg at night but fortunately did not have significant hypoglycemia events.  He is still taking Rybelsus 3 mg daily without significant GI side effects.  For some reason the previous prescription for 7 mg did not go through.  He is also on metformin  mg 2 tablets twice daily, pioglitazone 30 mg and Farxiga 10 mg for diabetes.  Most recent A1c was 6.9 in June.  Hypertension remains controlled on current medications of lisinopril 40 mg and hydrochlorothiazide 25 mg.  He is on gabapentin 300 mg at bedtime for right lumbar radiculopathy.  He is on atorvastatin 20 mg for hyperlipidemia without myalgia.       Lab Results   Component Value Date    HGBA1C 6.9 (H) 06/16/2022    HGBA1C 6.8 11/03/2021    HGBA1C 7.20 (H) 06/30/2021    CREATININE 0.91 06/16/2022    LDL 53 06/16/2022    MALBCRERATIO 69 (H) 06/30/2021     Blood sugar readings recorded on patient's flowsheet:  No flowsheet data found.    97.1 kg (214 lb)    REVIEW OF SYSTEMS           PHYSICAL EXAMINATION     Physical Exam  General: No acute distress  Psych: Normal thought and judgment   Cardiovascular Rate: normal. Rhythm: regular. Heart sounds: normal   Pulm/Chest: Effort normal, breath sounds normal.  Trace bilateral lower extremities edema       REVIEWED DATA     Labs:       Lab Results   Component Value Date     06/16/2022    K 4.1 06/16/2022    CALCIUM 9.7 06/16/2022    AST 24 06/16/2022    ALT 17 06/16/2022    BUN 20 06/16/2022    CREATININE 0.91 06/16/2022    CREATININE 0.90 06/30/2021    CREATININE 1.10 09/30/2020    EGFRIFNONA 82 06/30/2021    EGFRIFAFRI 100 06/30/2021       Lab Results   Component Value Date    HGBA1C 6.9 (H) 06/16/2022    HGBA1C 6.8 11/03/2021    HGBA1C 7.20 (H) 06/30/2021       Lab  Results   Component Value Date    LDL 53 06/16/2022    LDL 60 06/30/2021    LDL 67 02/05/2020    HDL 42 06/16/2022    HDL 45 06/30/2021    TRIG 180 (H) 06/16/2022    TRIG 156 (H) 06/30/2021       Lab Results   Component Value Date    TSH 0.859 03/22/2019    TSH 1.140 03/27/2018    FREET4 1.65 03/22/2019    FREET4 1.58 03/27/2018       Lab Results   Component Value Date    WBC 4.7 06/16/2022    HGB 14.1 06/16/2022     06/16/2022       Lab Results   Component Value Date    MALBCRERATIO 69 (H) 06/30/2021          Imaging:           Medical Tests:           Summary of old records / correspondence / consultant report:           Request outside records:           *Examiner was wearing KN95 mask and eye protection during the entire duration of the visit. Patient was masked the entire time. Minimum social distance of 6 ft maintained entire visit except if physical contact was necessary as documented.       Template created by Sugey Godfrey MD

## 2023-02-09 NOTE — ASSESSMENT & PLAN NOTE
Advised to take glimepiride 1 mg in the morning (was taking at night).     Increase Rybelsus to 7 mg qAM.     Continue metformin  mg two BID, pioglitazone 30 mg, and Farxiga 10 mg qAM.     Check A1c today

## 2023-02-10 LAB
ALBUMIN SERPL-MCNC: 4.9 G/DL (ref 3.5–5.2)
ALBUMIN/GLOB SERPL: 2 G/DL
ALP SERPL-CCNC: 77 U/L (ref 39–117)
ALT SERPL-CCNC: 13 U/L (ref 1–41)
AST SERPL-CCNC: 18 U/L (ref 1–40)
BILIRUB SERPL-MCNC: 0.4 MG/DL (ref 0–1.2)
BUN SERPL-MCNC: 23 MG/DL (ref 8–23)
BUN/CREAT SERPL: 22.5 (ref 7–25)
CALCIUM SERPL-MCNC: 9.7 MG/DL (ref 8.6–10.5)
CHLORIDE SERPL-SCNC: 99 MMOL/L (ref 98–107)
CO2 SERPL-SCNC: 32.4 MMOL/L (ref 22–29)
CREAT SERPL-MCNC: 1.02 MG/DL (ref 0.76–1.27)
EGFRCR SERPLBLD CKD-EPI 2021: 76.6 ML/MIN/1.73
GLOBULIN SER CALC-MCNC: 2.4 GM/DL
GLUCOSE SERPL-MCNC: 117 MG/DL (ref 65–99)
HBA1C MFR BLD: 7.1 % (ref 4.8–5.6)
POTASSIUM SERPL-SCNC: 4.2 MMOL/L (ref 3.5–5.2)
PROT SERPL-MCNC: 7.3 G/DL (ref 6–8.5)
SODIUM SERPL-SCNC: 141 MMOL/L (ref 136–145)

## 2023-02-10 NOTE — PROGRESS NOTES
Send result letter to patient.    Here are your recent lab results:    A1c level for glucose average is little higher than last time.  Increase dose of Rybelsus to 7 mg as discussed.  Maintain low carbohydrate diet for better diabetes control.    Please call my office if you have any questions. Otherwise, we can discuss the results in further detail on your next visit.

## 2023-04-10 DIAGNOSIS — E11.9 TYPE 2 DIABETES MELLITUS WITHOUT COMPLICATION: ICD-10-CM

## 2023-04-10 RX ORDER — METFORMIN HYDROCHLORIDE 500 MG/1
1000 TABLET, EXTENDED RELEASE ORAL 2 TIMES DAILY
Qty: 360 TABLET | Refills: 3 | Status: SHIPPED | OUTPATIENT
Start: 2023-04-10

## 2023-04-13 NOTE — TELEPHONE ENCOUNTER
Caller: Meño Salas    Relationship: Self    Best call back number: [phone]    Requested Prescriptions:   Requested Prescriptions     Pending Prescriptions Disp Refills   • timolol (TIMOPTIC) 0.5 % ophthalmic solution          Pharmacy where request should be sent: [facility] [id number] - [place], [place] - [address] [facility] [phone]  - [phone] FX     Last office visit with prescribing clinician: 2/9/2023   Last telemedicine visit with prescribing clinician: 6/21/2023   Next office visit with prescribing clinician: 6/21/2023     Additional details provided by patient:     Does the patient have less than a 3 day supply:  [] Yes  [x] No    Would you like a call back once the refill request has been completed: [] Yes [x] No    If the office needs to give you a call back, can they leave a voicemail: [] Yes [x] No    Mimi Son Rep   04/13/23 14:57 EDT

## 2023-04-19 RX ORDER — TIMOLOL MALEATE 5 MG/ML
SOLUTION/ DROPS OPHTHALMIC
OUTPATIENT
Start: 2023-04-19

## 2023-08-24 DIAGNOSIS — E11.65 TYPE 2 DIABETES MELLITUS WITH HYPERGLYCEMIA, WITHOUT LONG-TERM CURRENT USE OF INSULIN: Chronic | ICD-10-CM

## 2023-08-24 RX ORDER — DAPAGLIFLOZIN 10 MG/1
TABLET, FILM COATED ORAL
Qty: 90 TABLET | Refills: 1 | Status: SHIPPED | OUTPATIENT
Start: 2023-08-24

## 2023-08-24 RX ORDER — ORAL SEMAGLUTIDE 7 MG/1
TABLET ORAL
Qty: 90 TABLET | Refills: 1 | Status: SHIPPED | OUTPATIENT
Start: 2023-08-24

## 2023-10-30 DIAGNOSIS — E78.2 MIXED HYPERLIPIDEMIA: Chronic | ICD-10-CM

## 2023-10-30 RX ORDER — ATORVASTATIN CALCIUM 20 MG/1
20 TABLET, FILM COATED ORAL
Qty: 90 TABLET | Refills: 1 | Status: SHIPPED | OUTPATIENT
Start: 2023-10-30

## 2023-11-02 NOTE — PROGRESS NOTES
[facility] INTERNAL MEDICINE  PROSPER JONES M.D.      Meño Belarusian / 74 y.o. / male  05/14/2019      ASSESSMENT & PLAN:    Problem List Items Addressed This Visit        High    Type 2 diabetes mellitus with hyperglycemia (CMS/HCC) - Primary (Chronic)    Current Assessment & Plan     Lab Results   Component Value Date    HGBA1C 6.70 (H) 11/06/2018    HGBA1C 7.06 (H) 03/27/2018    HGBA1C 7.20 (H) 02/13/2017      Continue metformin  mg 2 BID, pioglitazone 30 mg qd, and glimepiride 2 mg 1/2 qAM.          Relevant Medications    lisinopril (PRINIVIL,ZESTRIL) 30 MG tablet    metFORMIN ER (GLUCOPHAGE-XR) 500 MG 24 hr tablet    pioglitazone (ACTOS) 30 MG tablet    glimepiride (AMARYL) 2 MG tablet    Other Relevant Orders    Hemoglobin A1c    Urinalysis With Microscopic If Indicated (No Culture) - Urine, Clean Catch    Microalbumin / Creatinine Urine Ratio - Urine, Clean Catch    Hypertension (Chronic)    Current Assessment & Plan     Continue lisinopril 30 mg qd.   Continue furosemide 20 mg qd for edema.          Relevant Medications    lisinopril (PRINIVIL,ZESTRIL) 30 MG tablet    furosemide (LASIX) 20 MG tablet    Generalized edema    Current Assessment & Plan     Continue furosemide 20 mg qd.   Wear compression stockings daily.          Relevant Medications    furosemide (LASIX) 20 MG tablet    Other Relevant Orders    Comprehensive Metabolic Panel       Medium    Hyperlipidemia (Chronic)    Overview     Continue fenofibrate 145 mg          Relevant Medications    fenofibrate (TRICOR) 145 MG tablet    Other Relevant Orders    Lipid Panel With / Chol / HDL Ratio      Other Visit Diagnoses     Need for pneumococcal vaccine        Relevant Orders    Pneumococcal Polysaccharide Vaccine 23-Valent Greater Than or Equal To 1yo Subcutaneous / IM (Completed)        Orders Placed This Encounter   Procedures   • Pneumococcal Polysaccharide Vaccine 23-Valent Greater Than or Equal To 1yo Subcutaneous / IM   • Hemoglobin A1c   • Lipid Panel  With / Chol / HDL Ratio   • Comprehensive Metabolic Panel   • Urinalysis With Microscopic If Indicated (No Culture) - Urine, Clean Catch   • Microalbumin / Creatinine Urine Ratio - Urine, Clean Catch     No orders of the defined types were placed in this encounter.      Summary/Discussion:      Next Appointment with me: Visit date not found    Return in about 3 months (around 8/14/2019) for Reassess chronic medical problems.  ____________________________________________________________________    MEDICATIONS  Current Outpatient Medications   Medication Sig Dispense Refill   • AGAMATRIX PRESTO TEST test strip USE ONE STRIP TO TEST BLOOD SUGAR DAILY 100 each 12   • fenofibrate (TRICOR) 145 MG tablet Take 1 tablet by mouth Daily for 270 days. 30 tablet 5   • fluticasone (FLONASE) 50 MCG/ACT nasal spray PLACE 2 SPRAYS IN EACH NOSTRIL DAILY 16 g 2   • furosemide (LASIX) 20 MG tablet TAKE ONE TABLET BY MOUTH EVERY MORNING 30 tablet 5   • glimepiride (AMARYL) 2 MG tablet TAKE ONE TABLET BY MOUTH EVERY MORNING WITH BREAKFAST AND TAKE ONE-HALF TABLET BY MOUTH WITH SUPPER 135 tablet 2   • glucose blood (WAVESENSE PRESTO) test strip Check BS once daily 100 each 0   • glucose blood test strip [employer] Blood Glucose Test In Vitro Strip; Patient Sig: [employer] Blood Glucose Test In Vitro Strip TEST BLOOD SUGAR ONCE DAILY; 1; 3; 25-Sep-2015; Active     • [employer] LANCETS THIN 26G misc Use to test blood sugar one time daily. *PT MUST MAKE APPT FOR FUTURE REFILLS* 100 each 0   • [employer] LANCETS THIN 26G misc USE ONE LANCET TO TEST DAILY 100 each 1   • lisinopril (PRINIVIL,ZESTRIL) 30 MG tablet Take 1 tablet by mouth Daily. 90 tablet 1   • metFORMIN ER (GLUCOPHAGE-XR) 500 MG 24 hr tablet Take 2 tablets by mouth 2 (Two) Times a Day. 360 tablet 1   • pioglitazone (ACTOS) 30 MG tablet Take 1 tablet by mouth Every Morning. 90 tablet 1     No current facility-administered medications for this visit.        VITALS    Visit Vitals  /74  "  Pulse 64   Temp 97.3 °F (36.3 °C)   Ht 175.3 cm (69.02\")   Wt 95.3 kg (210 lb)   SpO2 98%   BMI 31.00 kg/m²       BP Readings from Last 3 Encounters:   05/14/19 128/74   03/22/19 156/80   11/06/18 132/74     Wt Readings from Last 3 Encounters:   05/14/19 95.3 kg (210 lb)   03/22/19 95.3 kg (210 lb)   11/06/18 96.2 kg (212 lb)      Body mass index is 31 kg/m².    CC:  Main reason(s) for today's visit: Follow-up for diabetes and related medical problems    HPI:     Chronic type 2 diabetes:  Diabetic complications: none identified. Current therapy: metformin/metformin ER, glimepiride and pioglitazone.  Most recent change to treatment plan: made significant improvement in diet.  Home blood sugar levels: has no significant low sugar symptoms/problems.   Most recent relevant labs:   Lab Results   Component Value Date    HGBA1C 6.70 (H) 11/06/2018    HGBA1C 7.06 (H) 03/27/2018    HGBA1C 7.20 (H) 02/13/2017    CREATININE 1.02 03/22/2019    LDL 81 03/27/2018    MICROALBUR 48.7 03/27/2018       Chronic essential hypertension:  Since prior visit: compliant with medication(s) and swelling of hands/feet have improved somewhat.  Most recent in-office blood pressure readings:   BP Readings from Last 3 Encounters:   05/14/19 128/74   03/22/19 156/80   11/06/18 132/74       Chronic hyperlipidemia:  Current therapy include fenofibrate, denies problems with medication.    Most recent labs:   Lab Results   Component Value Date    LDL 81 03/27/2018    LDL 94 02/13/2017    LDL 97 12/11/2015    HDL 53 03/27/2018    HDL 49 02/13/2017    HDL 45 12/11/2015    TRIG 89 03/27/2018    CHOLHDLRATIO 2.87 03/27/2018    CHOLHDLRATIO 3.14 02/13/2017    CHOLHDLRATIO 3.5 12/11/2015              Patient Care Team:  Siva Godfrey MD as PCP - General  Siva Godfrey MD as PCP - Claims Attributed  ____________________________________________________________________    REVIEW OF SYSTEMS    Review of Systems  As noted per HPI  Constitutional neg  Resp neg  CV " neg  GI neg    neg       PHYSICAL EXAMINATION    Physical Exam  Constitutional  No distress  Cardiovascular Rate  normal . Rhythm: regular . Heart sounds:  Normal. Varicose veins of BLE. 1+ BLE edema.   Pulmonary/Chest: Effort normal and breath sounds normal.    Psychiatric  Alert. Judgment and thought content normal. Mood normal    REVIEWED DATA:    Labs:   Lab Results   Component Value Date     03/22/2019    K 4.2 03/22/2019    CALCIUM 9.7 03/22/2019    AST 24 03/22/2019    ALT 13 03/22/2019    BUN 19 03/22/2019    CREATININE 1.02 03/22/2019    CREATININE 0.98 12/03/2018    CREATININE 1.32 (H) 11/06/2018    EGFRIFNONA 74 03/22/2019    EGFRIFAFRI 85 03/22/2019       Lab Results   Component Value Date    HGBA1C 6.70 (H) 11/06/2018    HGBA1C 7.06 (H) 03/27/2018    HGBA1C 7.20 (H) 02/13/2017    MICROALBUR 48.7 03/27/2018       Lab Results   Component Value Date    LDL 81 03/27/2018    LDL 94 02/13/2017    LDL 97 12/11/2015    HDL 53 03/27/2018    HDL 49 02/13/2017    TRIG 89 03/27/2018    CHOLHDLRATIO 2.87 03/27/2018       Lab Results   Component Value Date    TSH 0.859 03/22/2019    FREET4 1.65 03/22/2019          Lab Results   Component Value Date    WBC 5.38 12/03/2018    HGB 13.5 (L) 12/03/2018    HGB 13.0 (L) 11/06/2018     12/03/2018        Lab Results   Component Value Date    PROTEIN Negative 03/27/2018    GLUCOSEU Negative 03/27/2018    BLOODU Negative 03/27/2018    NITRITEU Negative 03/27/2018    LEUKOCYTESUR Negative 03/27/2018       Imaging:          Medical Tests:          Summary of old records / correspondence / consultant report:          Request outside records:          ALLERGIES  No Known Allergies     PFSH:     The following portions of the patient's history were reviewed and updated as appropriate: Allergies / Current Medications / Past Medical History / Surgical History / Social History / Family History    PROBLEM LIST   Patient Active Problem List   Diagnosis   • Type 2 diabetes  mellitus with hyperglycemia (CMS/HCC)   • Atopic rhinitis   • Hyperlipidemia   • Hypertension   • Lumbago of lumbar region with sciatica   • Generalized edema       PAST MEDICAL HISTORY  Past Medical History:   Diagnosis Date   • Diabetes mellitus (CMS/HCC)    • Hyperlipidemia    • Hypertension        SURGICAL HISTORY  History reviewed. No pertinent surgical history.    SOCIAL HISTORY  Social History     Socioeconomic History   • Marital status:      Spouse name: Not on file   • Number of children: Not on file   • Years of education: Not on file   • Highest education level: Not on file   Occupational History   • Occupation: [employer] owner   Tobacco Use   • Smoking status: Unknown If Ever Smoked   • Smokeless tobacco: Never Used       FAMILY HISTORY  History reviewed. No pertinent family history.      **Dragon Disclaimer:   Much of this encounter note is an electronic transcription/translation of spoken language to printed text. The electronic translation of spoken language may permit erroneous, or at times, nonsensical words or phrases to be inadvertently transcribed. Although I have reviewed the note for such errors, some may still exist.       Template created by Sugey Godfrey MD    Risks/benefits discussed with patient/surrogate/Vaccine Information Sheet (VIS) provided-VIS date: 8/6/21

## 2023-11-27 ENCOUNTER — OFFICE VISIT (OUTPATIENT)
Dept: INTERNAL MEDICINE | Age: 78
End: 2023-11-27
Payer: COMMERCIAL

## 2023-11-27 VITALS
HEIGHT: 69 IN | BODY MASS INDEX: 31.1 KG/M2 | OXYGEN SATURATION: 97 % | HEART RATE: 61 BPM | SYSTOLIC BLOOD PRESSURE: 140 MMHG | DIASTOLIC BLOOD PRESSURE: 90 MMHG | WEIGHT: 210 LBS | TEMPERATURE: 97.1 F

## 2023-11-27 DIAGNOSIS — E78.2 MIXED HYPERLIPIDEMIA: Chronic | ICD-10-CM

## 2023-11-27 DIAGNOSIS — E11.65 TYPE 2 DIABETES MELLITUS WITH HYPERGLYCEMIA, WITHOUT LONG-TERM CURRENT USE OF INSULIN: Primary | Chronic | ICD-10-CM

## 2023-11-27 DIAGNOSIS — I10 PRIMARY HYPERTENSION: Chronic | ICD-10-CM

## 2023-11-27 DIAGNOSIS — E66.09 CLASS 1 OBESITY DUE TO EXCESS CALORIES WITH SERIOUS COMORBIDITY AND BODY MASS INDEX (BMI) OF 30.0 TO 30.9 IN ADULT: ICD-10-CM

## 2023-11-27 PROBLEM — E66.811 CLASS 1 OBESITY DUE TO EXCESS CALORIES WITH SERIOUS COMORBIDITY AND BODY MASS INDEX (BMI) OF 30.0 TO 30.9 IN ADULT: Chronic | Status: ACTIVE | Noted: 2023-11-27

## 2023-11-27 PROBLEM — E66.811 CLASS 1 OBESITY DUE TO EXCESS CALORIES WITH SERIOUS COMORBIDITY AND BODY MASS INDEX (BMI) OF 30.0 TO 30.9 IN ADULT: Status: ACTIVE | Noted: 2023-11-27

## 2023-11-27 LAB
ALBUMIN SERPL-MCNC: 4.6 G/DL (ref 3.5–5.2)
ALBUMIN/GLOB SERPL: 2.2 G/DL
ALP SERPL-CCNC: 65 U/L (ref 39–117)
ALT SERPL-CCNC: 14 U/L (ref 1–41)
AST SERPL-CCNC: 19 U/L (ref 1–40)
BILIRUB SERPL-MCNC: 0.6 MG/DL (ref 0–1.2)
BUN SERPL-MCNC: 16 MG/DL (ref 8–23)
BUN/CREAT SERPL: 21.9 (ref 7–25)
CALCIUM SERPL-MCNC: 9.5 MG/DL (ref 8.6–10.5)
CHLORIDE SERPL-SCNC: 102 MMOL/L (ref 98–107)
CO2 SERPL-SCNC: 30.1 MMOL/L (ref 22–29)
CREAT SERPL-MCNC: 0.73 MG/DL (ref 0.76–1.27)
EGFRCR SERPLBLD CKD-EPI 2021: 94.3 ML/MIN/1.73
GLOBULIN SER CALC-MCNC: 2.1 GM/DL
GLUCOSE SERPL-MCNC: 122 MG/DL (ref 65–99)
HBA1C MFR BLD: 7.1 % (ref 4.8–5.6)
POTASSIUM SERPL-SCNC: 3.8 MMOL/L (ref 3.5–5.2)
PROT SERPL-MCNC: 6.7 G/DL (ref 6–8.5)
SODIUM SERPL-SCNC: 140 MMOL/L (ref 136–145)

## 2023-11-27 NOTE — ASSESSMENT & PLAN NOTE
Wt Readings from Last 3 Encounters:   11/27/23 95.3 kg (210 lb)   06/21/23 95.3 kg (210 lb)   02/09/23 97.1 kg (214 lb)     Body mass index is 30.99 kg/m².     Weight loss advised.   Maintain a low sugar/starch/carbohydrate diet.   Will have to discontinue Rybelsus due to ongoing GI side effects.

## 2023-11-27 NOTE — ASSESSMENT & PLAN NOTE
Discontinue Rybelsus due to ongoing GI side effects (was taking it inconsistently due to side effects)  Will need to monitor diet more closely.  Check lab today and also 1 week prior to follow-up in 3 months.   Bring in glucose readings on follow-up.     He will currently take:  Metformin  mg two BID  Glimepiride 1 mg qAM   Pioglitazone 30 mg qAM   Farxiga 10 mg qAM     Lab Results   Component Value Date    HGBA1C 6.70 (H) 06/21/2023    HGBA1C 7.10 (H) 02/09/2023    HGBA1C 6.9 (H) 06/16/2022    CREATININE 1.02 02/09/2023    LDL 71 06/21/2023    MALBCRERATIO 27 06/21/2023

## 2023-11-27 NOTE — ASSESSMENT & PLAN NOTE
BP Readings from Last 3 Encounters:   11/27/23 140/90   06/21/23 126/72   02/09/23 138/70      Blood pressure is marginal today.   Monitor home blood pressure readings.    Bring in blood pressure readings in 1 month.

## 2023-11-27 NOTE — PROGRESS NOTES
I N T E R N A L  M E D I C I N E    J U N O H  K I M,  M D      ENCOUNTER DATE:  11/27/2023    Meño Telugu / 78 y.o. / male    CHIEF COMPLAINT / REASON FOR OFFICE VISIT     Diabetes, Hypertension, and Hyperlipidemia      ASSESSMENT & PLAN     Problem List Items Addressed This Visit          High    Type 2 diabetes mellitus with hyperglycemia - Primary (Chronic)    Current Assessment & Plan     Discontinue Rybelsus due to ongoing GI side effects (was taking it inconsistently due to side effects)  Will need to monitor diet more closely.  Check lab today and also 1 week prior to follow-up in 3 months.   Bring in glucose readings on follow-up.     He will currently take:  Metformin  mg two BID  Glimepiride 1 mg qAM   Pioglitazone 30 mg qAM   Farxiga 10 mg qAM     Lab Results   Component Value Date    HGBA1C 6.70 (H) 06/21/2023    HGBA1C 7.10 (H) 02/09/2023    HGBA1C 6.9 (H) 06/16/2022    CREATININE 1.02 02/09/2023    LDL 71 06/21/2023    MALBCRERATIO 27 06/21/2023             Relevant Medications    glucose blood test strip    glimepiride (AMARYL) 1 MG tablet    pioglitazone (ACTOS) 30 MG tablet    metFORMIN ER (GLUCOPHAGE-XR) 500 MG 24 hr tablet    Farxiga 10 MG tablet    Other Relevant Orders    Comprehensive Metabolic Panel    Hemoglobin A1c    Hemoglobin A1c       Medium    Hyperlipidemia (Chronic)    Overview     Continue atorvastatin 20 mg daily.         Relevant Medications    atorvastatin (LIPITOR) 20 MG tablet    Hypertension (Chronic)    Overview     Continue lisinopril 40 mg and hydrochlorothiazide 25 mg daily         Current Assessment & Plan     BP Readings from Last 3 Encounters:   11/27/23 140/90   06/21/23 126/72   02/09/23 138/70      Blood pressure is marginal today.   Monitor home blood pressure readings.    Bring in blood pressure readings in 1 month.          Relevant Medications    lisinopril (PRINIVIL,ZESTRIL) 40 MG tablet    hydroCHLOROthiazide (HYDRODIURIL) 25 MG tablet       Low    Class  "1 obesity due to excess calories with serious comorbidity and body mass index (BMI) of 30.0 to 30.9 in adult (Chronic)    Current Assessment & Plan     Wt Readings from Last 3 Encounters:   11/27/23 95.3 kg (210 lb)   06/21/23 95.3 kg (210 lb)   02/09/23 97.1 kg (214 lb)   Body mass index is 30.99 kg/m².     Weight loss advised.   Maintain a low sugar/starch/carbohydrate diet.   Will have to discontinue Rybelsus due to ongoing GI side effects.           Orders Placed This Encounter   Procedures    Fluzone High-Dose 65+yrs ([id number])    Comprehensive Metabolic Panel    Hemoglobin A1c    Hemoglobin A1c     No orders of the defined types were placed in this encounter.      SUMMARY/DISCUSSION        Next Appointment with me: Visit date not found    Return in about 3 months (around 2/27/2024) for Diabetes, Hypertension, NONFASTING LABS 1 WEEK PRIOR TO FOLLOWUP.        VITAL SIGNS     Vitals:    11/27/23 0827   BP: 140/90   Pulse: 61   Temp: 97.1 °F (36.2 °C)   SpO2: 97%   Weight: 95.3 kg (210 lb)   Height: 175.3 cm (69.02\")       BP Readings from Last 3 Encounters:   11/27/23 140/90   06/21/23 126/72   02/09/23 138/70     Wt Readings from Last 3 Encounters:   11/27/23 95.3 kg (210 lb)   06/21/23 95.3 kg (210 lb)   02/09/23 97.1 kg (214 lb)     Body mass index is 30.99 kg/m².    Blood pressure readings recorded on patient flowsheet:       No data to display                MEDICATIONS AT THE TIME OF OFFICE VISIT     Current Outpatient Medications on File Prior to Visit   Medication Sig    atorvastatin (LIPITOR) 20 MG tablet TAKE ONE TABLET BY MOUTH DAILY BEFORE SUPPER    clotrimazole (LOTRIMIN) 1 % cream Apply 1 application topically to the appropriate area as directed 2 (Two) Times a Day.    Farxiga 10 MG tablet TAKE ONE TABLET BY MOUTH EVERY MORNING BEFORE BREAKFAST    fluticasone (FLONASE) 50 MCG/ACT nasal spray 2 sprays by Each Nare route Daily.    glimepiride (AMARYL) 1 MG tablet TAKE ONE TABLET BY MOUTH EVERY " MORNING BEFORE BREAKFAST    glucose blood test strip Check BS once daily    glucose blood test strip Check daily    hydroCHLOROthiazide (HYDRODIURIL) 25 MG tablet TAKE ONE TABLET BY MOUTH DAILY    [employer] LANCETS THIN 26G misc Use to test blood sugar one time daily. *PT MUST MAKE APPT FOR FUTURE REFILLS*    [employer] Lancets Thin 26G misc USE ONE LANCET TO TEST DAILY    lisinopril (PRINIVIL,ZESTRIL) 40 MG tablet Take 1 tablet by mouth Daily.    metFORMIN ER (GLUCOPHAGE-XR) 500 MG 24 hr tablet Take 2 tablets by mouth 2 (Two) Times a Day.    pioglitazone (ACTOS) 30 MG tablet TAKE ONE TABLET BY MOUTH EVERY MORNING    WAVESENSE PRESTO test strip USE ONE STRIP TO TEST DAILY    [DISCONTINUED] Semaglutide (Rybelsus) 7 MG tablet TAKE ONE TABLET BY MOUTH DAILY WITH BREAKFAST    timolol (TIMOPTIC) 0.5 % ophthalmic solution  (Patient not taking: Reported on 11/27/2023)     No current facility-administered medications on file prior to visit.         HISTORY OF PRESENT ILLNESS     Ongoing GI side effects from Rybelsus so taking it very inconsistently.   Compliant with all other diabetic medications.   He does experience some hypoglycemia symptoms after taking glimepiride in the morning by noon time.     Does not check blood pressure at home. It is marginal today.     Denies significant side effects from atorvastatin 20 mg.     Lab Results   Component Value Date    HGBA1C 6.70 (H) 06/21/2023    HGBA1C 7.10 (H) 02/09/2023    HGBA1C 6.9 (H) 06/16/2022    CREATININE 1.02 02/09/2023    LDL 71 06/21/2023    MALBCRERATIO 27 06/21/2023     Blood sugar readings recorded on patient's flowsheet:       No data to display               95.3 kg (210 lb)    REVIEW OF SYSTEMS           PHYSICAL EXAMINATION     Physical Exam  Mildly obese   Alert with normal thought and judgment.       REVIEWED DATA     Labs:       Lab Results   Component Value Date     02/09/2023    K 4.2 02/09/2023    CALCIUM 9.7 02/09/2023    AST 18 02/09/2023    ALT 13  "02/09/2023    BUN 23 02/09/2023    CREATININE 1.02 02/09/2023    CREATININE 0.91 06/16/2022    CREATININE 0.90 06/30/2021    EGFRIFNONA 82 06/30/2021    EGFRIFAFRI 100 06/30/2021       Lab Results   Component Value Date    HGBA1C 6.70 (H) 06/21/2023    HGBA1C 7.10 (H) 02/09/2023    HGBA1C 6.9 (H) 06/16/2022       Lab Results   Component Value Date    LDL 71 06/21/2023    LDL 53 06/16/2022    LDL 60 06/30/2021    HDL 49 06/21/2023    HDL 42 06/16/2022    TRIG 134 06/21/2023    TRIG 180 (H) 06/16/2022       Lab Results   Component Value Date    TSH 0.859 03/22/2019    TSH 1.140 03/27/2018    FREET4 1.65 03/22/2019    FREET4 1.58 03/27/2018       Lab Results   Component Value Date    WBC 4.7 06/16/2022    HGB 14.1 06/16/2022     06/16/2022       Lab Results   Component Value Date    MALBCRERATIO 27 06/21/2023        No results found for: \"PSA\"     Imaging:           Medical Tests:           Summary of old records / correspondence / consultant report:           Request outside records:               "

## 2023-11-27 NOTE — LETTER
[facility]  Vaccine Consent Form    Patient Name:  Meño Salas  Patient :  1945     Vaccine(s) Ordered    Fluzone High-Dose 65+yrs ([id number])        Screening Checklist  The following questions should be completed prior to vaccination. If you answer “yes” to any question, it does not necessarily mean you should not be vaccinated. It just means we may need to clarify or ask more questions. If a question is unclear, please ask your healthcare provider to explain it.    Yes No   Any fever or moderate to severe illness today (mild illness and/or antibiotic treatment are not contraindications)?     Do you have a history of a serious reaction to any previous vaccinations, such as anaphylaxis, encephalopathy within 7 days, Guillain-[place] syndrome within 6 weeks, seizure?     Have you received any live vaccine(s) (e.g MMR, ALVINA) or any other vaccines in the last month (to ensure duplicate doses aren't given)?     Do you have an anaphylactic allergy to latex (DTaP, DTaP-IPV, Hep A, Hep B, MenB, RV, Td, Tdap), baker’s yeast (Hep B, HPV), polysorbates (RSV, nirsevimab, PCV 20, Rotavirrus, Tdap, Shingrix), or gelatin (ALVINA, MMR)?     Do you have an anaphylactic allergy to neomycin (Rabies, ALVINA, MMR, IPV, Hep A), polymyxin B (IPV), or streptomycin (IPV)?      Any cancer, leukemia, AIDS, or other immune system disorder? (ALVINA, MMR, RV)     Do you have a parent, brother, or sister with an immune system problem (if immune competence of vaccine recipient clinically verified, can proceed)? (MMR, ALVINA)     Any recent steroid treatments for >2 weeks, chemotherapy, or radiation treatment? (ALVINA, MMR)     Have you received antibody-containing blood transfusions or IVIG in the past 11 months (recommended interval is dependent on product)? (MMR, ALVINA)     Have you taken antiviral drugs (acyclovir, famciclovir, valacyclovir for ALVINA) in the last 24 or 48 hours, respectively?      Are you pregnant or planning to become pregnant within 1  month? (ALVINA, MMR, HPV, IPV, MenB, Abrexvy; For Hep B- refer to Engerix-B; For RSV - Abrysvo is indicated for 32-36 weeks of pregnancy from September to January)     For infants, have you ever been told your child has had intussusception or a medical emergency involving obstruction of the intestine (Rotavirus)? If not for an infant, can skip this question.         *Ordering Physician/APC should be consulted if “yes” is checked by the patient or guardian above.      I have received, read, and understand the Vaccine Information Statement (VIS) for each vaccine ordered above.  I have considered my health status as well as the health status of my close contacts.  I have taken the opportunity to discuss my vaccine questions with my health care provider.   I have requested that the ordered vaccine(s) be given to me.  I understand the benefits and risks of the vaccines.  I understand that I should remain in the clinic for 15 minutes after receiving the vaccine(s).  _________________________________________________________  Signature of Patient or Parent/Legal Guardian ____________________  Date

## 2023-11-28 NOTE — PROGRESS NOTES
CALL PATIENT WITH TEST RESULTS:  [employer] [place]     - Be sure to communicate DIRECTLY with the patient/surrogate EVEN IF the result(s) has been released or viewed by the patient on [employer]) .  - Also, mail the results IF [employer] is NOT active.      A1c for average glucose level is higher. Continue current medications but try to watch diet closer.   May need more medication if A1c is any higher. Have A1c checked prior to follow-up in February.

## 2023-12-23 DIAGNOSIS — J30.1 SEASONAL ALLERGIC RHINITIS DUE TO POLLEN: ICD-10-CM

## 2023-12-26 RX ORDER — FLUTICASONE PROPIONATE 50 MCG
2 SPRAY, SUSPENSION (ML) NASAL DAILY
Qty: 16 ML | Refills: 3 | Status: SHIPPED | OUTPATIENT
Start: 2023-12-26

## 2024-01-03 DIAGNOSIS — I10 ESSENTIAL HYPERTENSION: Chronic | ICD-10-CM

## 2024-01-04 RX ORDER — LISINOPRIL 40 MG/1
40 TABLET ORAL DAILY
Qty: 90 TABLET | Refills: 1 | Status: SHIPPED | OUTPATIENT
Start: 2024-01-04

## 2024-02-01 DIAGNOSIS — E11.9 TYPE 2 DIABETES MELLITUS WITHOUT COMPLICATION: ICD-10-CM

## 2024-02-01 DIAGNOSIS — I10 PRIMARY HYPERTENSION: Chronic | ICD-10-CM

## 2024-02-01 DIAGNOSIS — E11.65 TYPE 2 DIABETES MELLITUS WITH HYPERGLYCEMIA, WITHOUT LONG-TERM CURRENT USE OF INSULIN: Chronic | ICD-10-CM

## 2024-02-01 RX ORDER — PIOGLITAZONEHYDROCHLORIDE 30 MG/1
TABLET ORAL
Qty: 90 TABLET | Refills: 1 | Status: SHIPPED | OUTPATIENT
Start: 2024-02-01

## 2024-02-01 RX ORDER — GLIMEPIRIDE 1 MG/1
TABLET ORAL
Qty: 90 TABLET | Refills: 1 | Status: SHIPPED | OUTPATIENT
Start: 2024-02-01

## 2024-02-01 RX ORDER — HYDROCHLOROTHIAZIDE 25 MG/1
TABLET ORAL
Qty: 90 TABLET | Refills: 1 | Status: SHIPPED | OUTPATIENT
Start: 2024-02-01

## 2024-02-21 DIAGNOSIS — E11.65 TYPE 2 DIABETES MELLITUS WITH HYPERGLYCEMIA, WITHOUT LONG-TERM CURRENT USE OF INSULIN: Chronic | ICD-10-CM

## 2024-02-21 LAB — HBA1C MFR BLD: 7.2 % (ref 4.8–5.6)

## 2024-02-28 ENCOUNTER — OFFICE VISIT (OUTPATIENT)
Dept: INTERNAL MEDICINE | Age: 79
End: 2024-02-28
Payer: MEDICARE

## 2024-02-28 VITALS
WEIGHT: 213 LBS | TEMPERATURE: 97.3 F | DIASTOLIC BLOOD PRESSURE: 88 MMHG | OXYGEN SATURATION: 96 % | BODY MASS INDEX: 31.55 KG/M2 | HEIGHT: 69 IN | HEART RATE: 71 BPM | SYSTOLIC BLOOD PRESSURE: 144 MMHG

## 2024-02-28 DIAGNOSIS — E78.2 MIXED HYPERLIPIDEMIA: Chronic | ICD-10-CM

## 2024-02-28 DIAGNOSIS — I10 PRIMARY HYPERTENSION: Chronic | ICD-10-CM

## 2024-02-28 DIAGNOSIS — E11.65 TYPE 2 DIABETES MELLITUS WITH HYPERGLYCEMIA, WITHOUT LONG-TERM CURRENT USE OF INSULIN: Primary | Chronic | ICD-10-CM

## 2024-02-28 RX ORDER — CELECOXIB 100 MG/1
CAPSULE ORAL
COMMUNITY
Start: 2024-01-05 | End: 2024-02-28

## 2024-02-28 RX ORDER — OLMESARTAN MEDOXOMIL 40 MG/1
40 TABLET ORAL DAILY
Qty: 30 TABLET | Refills: 3 | Status: SHIPPED | OUTPATIENT
Start: 2024-02-28

## 2024-02-28 NOTE — PROGRESS NOTES
I N T E R N A L  M E D I C I N E    J U N O H  K I M,  M D      ENCOUNTER DATE:  02/28/2024    Meño Greenlandic / 78 y.o. / male    CHIEF COMPLAINT / REASON FOR OFFICE VISIT     Diabetes and Hypertension      ASSESSMENT & PLAN     Problem List Items Addressed This Visit          High    Type 2 diabetes mellitus with hyperglycemia - Primary (Chronic)    Overview     Continue:  Glimepiride 1 mg qAM   Farxiga 10 mg  Metformin  mg 2 BID  Pioglitazone 30 mg          Current Assessment & Plan     Lab Results   Component Value Date    HGBA1C 7.20 (H) 02/21/2024    HGBA1C 7.10 (H) 11/27/2023    HGBA1C 6.70 (H) 06/21/2023    CREATININE 0.73 (L) 11/27/2023    LDL 71 06/21/2023    MALBCRERATIO 27 06/21/2023      A1c is slightly higher. Try to watch diet better. Continue current medications.          Relevant Medications    glucose blood test strip    metFORMIN ER (GLUCOPHAGE-XR) 500 MG 24 hr tablet    Farxiga 10 MG tablet    pioglitazone (ACTOS) 30 MG tablet    glimepiride (AMARYL) 1 MG tablet    olmesartan (BENICAR) 40 MG tablet       Medium    Hyperlipidemia (Chronic)    Overview     Continue atorvastatin 20 mg daily.         Relevant Medications    atorvastatin (LIPITOR) 20 MG tablet    Hypertension (Chronic)    Overview     Continue lisinopril 40 mg and hydrochlorothiazide 25 mg daily         Relevant Medications    hydroCHLOROthiazide (HYDRODIURIL) 25 MG tablet    olmesartan (BENICAR) 40 MG tablet     No orders of the defined types were placed in this encounter.    New Medications Ordered This Visit   Medications    olmesartan (BENICAR) 40 MG tablet     Sig: Take 1 tablet by mouth Daily.     Dispense:  30 tablet     Refill:  3       SUMMARY/DISCUSSION        Next Appointment with me: Visit date not found    Return in about 3 months (around 5/28/2024) for Reassess chronic medical problems.        VITAL SIGNS     Vitals:    02/28/24 0809   BP: 144/88   Pulse: 71   Temp: 97.3 °F (36.3 °C)   SpO2: 96%   Weight: 96.6 kg (213  "lb)   Height: 175.3 cm (69.02\")       BP Readings from Last 3 Encounters:   02/28/24 144/88   11/27/23 140/90   06/21/23 126/72     Wt Readings from Last 3 Encounters:   02/28/24 96.6 kg (213 lb)   11/27/23 95.3 kg (210 lb)   06/21/23 95.3 kg (210 lb)     Body mass index is 31.44 kg/m².    Blood pressure readings recorded on patient flowsheet:       No data to display                MEDICATIONS AT THE TIME OF OFFICE VISIT     Current Outpatient Medications on File Prior to Visit   Medication Sig    atorvastatin (LIPITOR) 20 MG tablet TAKE ONE TABLET BY MOUTH DAILY BEFORE SUPPER    clotrimazole (LOTRIMIN) 1 % cream Apply 1 application topically to the appropriate area as directed 2 (Two) Times a Day.    Farxiga 10 MG tablet TAKE ONE TABLET BY MOUTH EVERY MORNING BEFORE BREAKFAST    fluticasone (FLONASE) 50 MCG/ACT nasal spray SPRAY TWO SPRAYS IN EACH NOSTRIL ONCE DAILY    glimepiride (AMARYL) 1 MG tablet TAKE ONE TABLET BY MOUTH EVERY MORNING BEFORE BREAKFAST    glucose blood test strip Check BS once daily    glucose blood test strip Check daily    hydroCHLOROthiazide (HYDRODIURIL) 25 MG tablet TAKE ONE TABLET BY MOUTH DAILY    [employer] LANCETS THIN 26G misc Use to test blood sugar one time daily. *PT MUST MAKE APPT FOR FUTURE REFILLS*    [employer] Lancets Thin 26G misc USE ONE LANCET TO TEST DAILY    metFORMIN ER (GLUCOPHAGE-XR) 500 MG 24 hr tablet Take 2 tablets by mouth 2 (Two) Times a Day.    pioglitazone (ACTOS) 30 MG tablet TAKE ONE TABLET BY MOUTH EVERY MORNING    timolol (TIMOPTIC) 0.5 % ophthalmic solution     [facility] PRESTO test strip USE ONE STRIP TO TEST DAILY    [DISCONTINUED] celecoxib (CeleBREX) 100 MG capsule     [DISCONTINUED] lisinopril (PRINIVIL,ZESTRIL) 40 MG tablet TAKE ONE TABLET BY MOUTH DAILY     No current facility-administered medications on file prior to visit.         HISTORY OF PRESENT ILLNESS     UTILIZED Ukrainian      Diabetes at home remains stable. No significant hypoglycemia. " Morning sugars are reasonably good. A1c is little higher.     Home blood pressure reviewed and is consistently > 140's. On multidrug regimen and reports compliance. His diet is Arabic.     Lab Results   Component Value Date    HGBA1C 7.20 (H) 02/21/2024    HGBA1C 7.10 (H) 11/27/2023    HGBA1C 6.70 (H) 06/21/2023    CREATININE 0.73 (L) 11/27/2023    LDL 71 06/21/2023    MALBCRERATIO 27 06/21/2023     Blood sugar readings recorded on patient's flowsheet:       No data to display               96.6 kg (213 lb)    REVIEW OF SYSTEMS     Constitutional neg except per HPI   Resp neg  CV neg   GI negative   negative   Neuro negative       PHYSICAL EXAMINATION     Physical Exam  General: No acute distress  Psych: Normal thought and judgment   Cardiovascular Rate: normal. Rhythm: regular. Heart sounds: normal   Pulm/Chest: Effort normal, breath sounds normal.   1+ bilateral lower extremities edema       REVIEWED DATA     Labs:       Lab Results   Component Value Date     11/27/2023    K 3.8 11/27/2023    CALCIUM 9.5 11/27/2023    AST 19 11/27/2023    ALT 14 11/27/2023    BUN 16 11/27/2023    CREATININE 0.73 (L) 11/27/2023    CREATININE 1.02 02/09/2023    CREATININE 0.91 06/16/2022    EGFRIFNONA 82 06/30/2021    EGFRIFAFRI 100 06/30/2021       Lab Results   Component Value Date    HGBA1C 7.20 (H) 02/21/2024    HGBA1C 7.10 (H) 11/27/2023    HGBA1C 6.70 (H) 06/21/2023       Lab Results   Component Value Date    LDL 71 06/21/2023    LDL 53 06/16/2022    LDL 60 06/30/2021    HDL 49 06/21/2023    HDL 42 06/16/2022    TRIG 134 06/21/2023    TRIG 180 (H) 06/16/2022       Lab Results   Component Value Date    TSH 0.859 03/22/2019    TSH 1.140 03/27/2018    FREET4 1.65 03/22/2019    FREET4 1.58 03/27/2018       Lab Results   Component Value Date    WBC 4.7 06/16/2022    HGB 14.1 06/16/2022     06/16/2022       Lab Results   Component Value Date    MALBCRERATIO 27 06/21/2023            Imaging:           Medical Tests:            Summary of old records / correspondence / consultant report:           Request outside records:

## 2024-02-28 NOTE — ASSESSMENT & PLAN NOTE
Lab Results   Component Value Date    HGBA1C 7.20 (H) 02/21/2024    HGBA1C 7.10 (H) 11/27/2023    HGBA1C 6.70 (H) 06/21/2023    CREATININE 0.73 (L) 11/27/2023    LDL 71 06/21/2023    MALBCRERATIO 27 06/21/2023      A1c is slightly higher. Try to watch diet better. Continue current medications.

## 2024-03-06 DIAGNOSIS — E11.65 TYPE 2 DIABETES MELLITUS WITH HYPERGLYCEMIA, WITHOUT LONG-TERM CURRENT USE OF INSULIN: Chronic | ICD-10-CM

## 2024-03-06 RX ORDER — DAPAGLIFLOZIN 10 MG/1
1 TABLET, FILM COATED ORAL
Qty: 90 TABLET | Refills: 1 | Status: SHIPPED | OUTPATIENT
Start: 2024-03-06

## 2024-04-30 DIAGNOSIS — E11.9 TYPE 2 DIABETES MELLITUS WITHOUT COMPLICATION: ICD-10-CM

## 2024-04-30 RX ORDER — METFORMIN HYDROCHLORIDE 500 MG/1
1000 TABLET, EXTENDED RELEASE ORAL 2 TIMES DAILY
Qty: 360 TABLET | Refills: 1 | Status: SHIPPED | OUTPATIENT
Start: 2024-04-30

## 2024-05-01 DIAGNOSIS — E78.2 MIXED HYPERLIPIDEMIA: Chronic | ICD-10-CM

## 2024-05-01 RX ORDER — ATORVASTATIN CALCIUM 20 MG/1
TABLET, FILM COATED ORAL
Qty: 90 TABLET | Refills: 1 | Status: SHIPPED | OUTPATIENT
Start: 2024-05-01

## 2024-05-28 DIAGNOSIS — I10 PRIMARY HYPERTENSION: Chronic | ICD-10-CM

## 2024-05-28 DIAGNOSIS — E11.65 TYPE 2 DIABETES MELLITUS WITH HYPERGLYCEMIA, WITHOUT LONG-TERM CURRENT USE OF INSULIN: Chronic | ICD-10-CM

## 2024-05-28 RX ORDER — OLMESARTAN MEDOXOMIL 40 MG/1
40 TABLET ORAL DAILY
Qty: 90 TABLET | Refills: 1 | Status: SHIPPED | OUTPATIENT
Start: 2024-05-28

## 2024-06-05 ENCOUNTER — OFFICE VISIT (OUTPATIENT)
Dept: INTERNAL MEDICINE | Age: 79
End: 2024-06-05
Payer: MEDICARE

## 2024-06-05 VITALS
HEART RATE: 68 BPM | SYSTOLIC BLOOD PRESSURE: 120 MMHG | DIASTOLIC BLOOD PRESSURE: 82 MMHG | WEIGHT: 220 LBS | HEIGHT: 69 IN | OXYGEN SATURATION: 96 % | BODY MASS INDEX: 32.58 KG/M2 | TEMPERATURE: 97.1 F

## 2024-06-05 DIAGNOSIS — I10 PRIMARY HYPERTENSION: Chronic | ICD-10-CM

## 2024-06-05 DIAGNOSIS — E66.09 CLASS 1 OBESITY DUE TO EXCESS CALORIES WITH SERIOUS COMORBIDITY AND BODY MASS INDEX (BMI) OF 32.0 TO 32.9 IN ADULT: Chronic | ICD-10-CM

## 2024-06-05 DIAGNOSIS — E11.65 TYPE 2 DIABETES MELLITUS WITH HYPERGLYCEMIA, WITHOUT LONG-TERM CURRENT USE OF INSULIN: Primary | Chronic | ICD-10-CM

## 2024-06-05 DIAGNOSIS — E78.2 MIXED HYPERLIPIDEMIA: Chronic | ICD-10-CM

## 2024-06-05 PROCEDURE — 3079F DIAST BP 80-89 MM HG: CPT | Performed by: INTERNAL MEDICINE

## 2024-06-05 PROCEDURE — G2211 COMPLEX E/M VISIT ADD ON: HCPCS | Performed by: INTERNAL MEDICINE

## 2024-06-05 PROCEDURE — 99214 OFFICE O/P EST MOD 30 MIN: CPT | Performed by: INTERNAL MEDICINE

## 2024-06-05 PROCEDURE — 3074F SYST BP LT 130 MM HG: CPT | Performed by: INTERNAL MEDICINE

## 2024-06-05 RX ORDER — CLOTRIMAZOLE 1 %
1 CREAM (GRAM) TOPICAL 2 TIMES DAILY
Qty: 113 G | Refills: 2 | Status: SHIPPED | OUTPATIENT
Start: 2024-06-05

## 2024-06-05 RX ORDER — ORAL SEMAGLUTIDE 7 MG/1
0.5 TABLET ORAL 2 TIMES DAILY
COMMUNITY
Start: 2024-06-05

## 2024-06-05 NOTE — ASSESSMENT & PLAN NOTE
Lab Results   Component Value Date    HGBA1C 7.20 (H) 02/21/2024    HGBA1C 7.10 (H) 11/27/2023    HGBA1C 6.70 (H) 06/21/2023    CREATININE 0.73 (L) 11/27/2023    LDL 71 06/21/2023    MALBCRERATIO 27 06/21/2023      Resumed Rybelsus 7 mg 1/2 tab BID (GI side effects) about 1 month ago.  In spite of this weight gain is noted.    Continue other meds including glimepiride 1 mg, Farxiga 10 mg, metformin  mg 2 twice daily and pioglitazone 30 mg daily.    Check labs today.

## 2024-06-05 NOTE — PROGRESS NOTES
I N T E R N A L  M E D I C I N E    J U N O H  K I M,  M D      ENCOUNTER DATE:  06/05/2024    Meño Georgian / 79 y.o. / male    CHIEF COMPLAINT / REASON FOR OFFICE VISIT     Diabetes, Hyperlipidemia, and Hypertension      ASSESSMENT & PLAN     Problem List Items Addressed This Visit          High    Type 2 diabetes mellitus with hyperglycemia - Primary (Chronic)    Overview     Continue:  Glimepiride 1 mg qAM   Farxiga 10 mg  Metformin  mg 2 BID  Pioglitazone 30 mg   Rybelsus 3 mg (taking 1/2 BID)         Current Assessment & Plan     Lab Results   Component Value Date    HGBA1C 7.20 (H) 02/21/2024    HGBA1C 7.10 (H) 11/27/2023    HGBA1C 6.70 (H) 06/21/2023    CREATININE 0.73 (L) 11/27/2023    LDL 71 06/21/2023    MALBCRERATIO 27 06/21/2023      Resumed Rybelsus 7 mg 1/2 tab BID (GI side effects) about 1 month ago.  In spite of this weight gain is noted.    Continue other meds including glimepiride 1 mg, Farxiga 10 mg, metformin  mg 2 twice daily and pioglitazone 30 mg daily.    Check labs today.          Relevant Medications    glucose blood test strip    pioglitazone (ACTOS) 30 MG tablet    glimepiride (AMARYL) 1 MG tablet    dapagliflozin Propanediol (Farxiga) 10 MG tablet    metFORMIN ER (GLUCOPHAGE-XR) 500 MG 24 hr tablet    olmesartan (BENICAR) 40 MG tablet    Semaglutide (Rybelsus) 7 MG tablet    Other Relevant Orders    Comprehensive Metabolic Panel    Hemoglobin A1c    Microalbumin / Creatinine Urine Ratio - Urine, Clean Catch       Medium    Hyperlipidemia (Chronic)    Overview     Continue atorvastatin 20 mg daily.         Relevant Medications    atorvastatin (LIPITOR) 20 MG tablet    Other Relevant Orders    Lipid Panel With / Chol / HDL Ratio    Hypertension (Chronic)    Current Assessment & Plan     BP Readings from Last 3 Encounters:   06/05/24 120/82   02/28/24 144/88   11/27/23 140/90       Continue current blood pressure meds including olmesartan 40 mg and hydrochlorothiazide 25 mg  "daily.         Relevant Medications    hydroCHLOROthiazide (HYDRODIURIL) 25 MG tablet    olmesartan (BENICAR) 40 MG tablet       Low    Class 1 obesity due to excess calories with serious comorbidity and body mass index (BMI) of 32.0 to 32.9 in adult (Chronic)     Orders Placed This Encounter   Procedures    Comprehensive Metabolic Panel    Lipid Panel With / Chol / HDL Ratio    Hemoglobin A1c    Microalbumin / Creatinine Urine Ratio - Urine, Clean Catch     New Medications Ordered This Visit   Medications    clotrimazole (LOTRIMIN) 1 % cream     Sig: Apply 1 Application topically to the appropriate area as directed 2 (Two) Times a Day.     Dispense:  113 g     Refill:  2       SUMMARY/DISCUSSION      Next Appointment with me: Visit date not found    Return in about 4 months (around 10/5/2024) for Reassess chronic medical problems.      VITAL SIGNS     Vitals:    06/05/24 1425   BP: 120/82   Pulse: 68   Temp: 97.1 °F (36.2 °C)   SpO2: 96%   Weight: 99.8 kg (220 lb)   Height: 175.3 cm (69.02\")       BP Readings from Last 3 Encounters:   06/05/24 120/82   02/28/24 144/88   11/27/23 140/90     Wt Readings from Last 3 Encounters:   06/05/24 99.8 kg (220 lb)   02/28/24 96.6 kg (213 lb)   11/27/23 95.3 kg (210 lb)     Body mass index is 32.47 kg/m².    Blood pressure readings recorded on patient flowsheet:       No data to display                  MEDICATIONS AT THE TIME OF OFFICE VISIT     Current Outpatient Medications on File Prior to Visit   Medication Sig    atorvastatin (LIPITOR) 20 MG tablet TAKE 1 TABLET BY MOUTH DAILY BEFORE SUPER    dapagliflozin Propanediol (Farxiga) 10 MG tablet Take 10 mg by mouth Every Morning Before Breakfast.    fluticasone (FLONASE) 50 MCG/ACT nasal spray SPRAY TWO SPRAYS IN EACH NOSTRIL ONCE DAILY    glimepiride (AMARYL) 1 MG tablet TAKE ONE TABLET BY MOUTH EVERY MORNING BEFORE BREAKFAST    glucose blood test strip Check BS once daily    glucose blood test strip Check daily    " hydroCHLOROthiazide (HYDRODIURIL) 25 MG tablet TAKE ONE TABLET BY MOUTH DAILY    [employer] LANCETS THIN 26G misc Use to test blood sugar one time daily. *PT MUST MAKE APPT FOR FUTURE REFILLS*    [employer] Lancets Thin 26G misc USE ONE LANCET TO TEST DAILY    metFORMIN ER (GLUCOPHAGE-XR) 500 MG 24 hr tablet TAKE TWO TABLETS BY MOUTH TWICE A DAY    olmesartan (BENICAR) 40 MG tablet TAKE 1 TABLET BY MOUTH DAILY    pioglitazone (ACTOS) 30 MG tablet TAKE ONE TABLET BY MOUTH EVERY MORNING    Semaglutide (Rybelsus) 7 MG tablet Take 3.5 mg by mouth 2 (Two) Times a Day.    [employer] PRESTO test strip USE ONE STRIP TO TEST DAILY    [DISCONTINUED] clotrimazole (LOTRIMIN) 1 % cream Apply 1 application topically to the appropriate area as directed 2 (Two) Times a Day.    timolol (TIMOPTIC) 0.5 % ophthalmic solution  (Patient not taking: Reported on 6/5/2024)     No current facility-administered medications on file prior to visit.          HISTORY OF PRESENT ILLNESS     Patient resumed Rybelsus 7 mg dose 1/2 tablet twice daily (due to GI side effects) about 1 month ago.  Prior to restarting Rybelsus he was experiencing increased appetite and weight gain.  He is currently also taking glimepiride 1 mg, Farxiga 10 mg, metformin  mg 2 tablets twice daily, pioglitazone 30 mg.  Denies significant hypoglycemia problems however he holds metformin whenever he plays golf due to symptoms of low blood sugar while playing golf.  Blood pressure remains stable on olmesartan 40 mg and hydrochlorothiazide 25 mg.  He is on atorvastatin 20 mg without problems for hyperlipidemia.    Patient Care Team:  Siva Godfrey MD as PCP - General  Louis Saldana MD as Consulting Physician (Ophthalmology)    REVIEW OF SYSTEMS     Review of Systems   Constitutional neg except per HPI   Resp neg  CV neg   GI negative   Neuro negative     PHYSICAL EXAMINATION     Physical Exam  General: No acute distress; Weight gain noted   Psych: Normal thought and judgment    Cardiovascular Rate: normal. Rhythm: regular. Heart sounds: normal   Pulm/Chest: Effort normal, breath sounds normal.   1+ bilateral lower extremities edema       REVIEWED DATA     Labs:     Lab Results   Component Value Date     11/27/2023    K 3.8 11/27/2023    CALCIUM 9.5 11/27/2023    AST 19 11/27/2023    ALT 14 11/27/2023    BUN 16 11/27/2023    CREATININE 0.73 (L) 11/27/2023    CREATININE 1.02 02/09/2023    CREATININE 0.91 06/16/2022    EGFRRESULT 94.3 11/27/2023       Lab Results   Component Value Date    HGBA1C 7.20 (H) 02/21/2024    HGBA1C 7.10 (H) 11/27/2023    HGBA1C 6.70 (H) 06/21/2023       Lab Results   Component Value Date    LDL 71 06/21/2023    LDL 53 06/16/2022    LDL 60 06/30/2021    HDL 49 06/21/2023    HDL 42 06/16/2022    TRIG 134 06/21/2023    TRIG 180 (H) 06/16/2022       Lab Results   Component Value Date    TSH 0.859 03/22/2019    TSH 1.140 03/27/2018    FREET4 1.65 03/22/2019    FREET4 1.58 03/27/2018       Lab Results   Component Value Date    WBC 4.7 06/16/2022    HGB 14.1 06/16/2022     06/16/2022       Lab Results   Component Value Date    MALBCRERATIO 27 06/21/2023             Imaging:               Medical Tests:               Summary of old records / correspondence / consultant report:             Request outside records:

## 2024-06-05 NOTE — ASSESSMENT & PLAN NOTE
BP Readings from Last 3 Encounters:   06/05/24 120/82   02/28/24 144/88   11/27/23 140/90       Continue current blood pressure meds including olmesartan 40 mg and hydrochlorothiazide 25 mg daily.

## 2024-06-06 LAB
ALBUMIN SERPL-MCNC: 4.5 G/DL (ref 3.8–4.8)
ALBUMIN/CREAT UR: 20 MG/G CREAT (ref 0–29)
ALBUMIN/GLOB SERPL: 1.7 {RATIO} (ref 1.2–2.2)
ALP SERPL-CCNC: 70 IU/L (ref 44–121)
ALT SERPL-CCNC: 10 IU/L (ref 0–44)
AST SERPL-CCNC: 23 IU/L (ref 0–40)
BILIRUB SERPL-MCNC: 0.4 MG/DL (ref 0–1.2)
BUN SERPL-MCNC: 22 MG/DL (ref 8–27)
BUN/CREAT SERPL: 20 (ref 10–24)
CALCIUM SERPL-MCNC: 9.6 MG/DL (ref 8.6–10.2)
CHLORIDE SERPL-SCNC: 101 MMOL/L (ref 96–106)
CHOLEST SERPL-MCNC: 120 MG/DL (ref 100–199)
CHOLEST/HDLC SERPL: 2.9 RATIO (ref 0–5)
CO2 SERPL-SCNC: 25 MMOL/L (ref 20–29)
CREAT SERPL-MCNC: 1.12 MG/DL (ref 0.76–1.27)
CREAT UR-MCNC: 96.1 MG/DL
EGFRCR SERPLBLD CKD-EPI 2021: 68 ML/MIN/1.73
GLOBULIN SER CALC-MCNC: 2.6 G/DL (ref 1.5–4.5)
GLUCOSE SERPL-MCNC: 73 MG/DL (ref 70–99)
HBA1C MFR BLD: 7.1 % (ref 4.8–5.6)
HDLC SERPL-MCNC: 41 MG/DL
LDLC SERPL CALC-MCNC: 49 MG/DL (ref 0–99)
MICROALBUMIN UR-MCNC: 18.8 UG/ML
POTASSIUM SERPL-SCNC: 4.3 MMOL/L (ref 3.5–5.2)
PROT SERPL-MCNC: 7.1 G/DL (ref 6–8.5)
SODIUM SERPL-SCNC: 140 MMOL/L (ref 134–144)
TRIGL SERPL-MCNC: 184 MG/DL (ref 0–149)
VLDLC SERPL CALC-MCNC: 30 MG/DL (ref 5–40)

## 2024-06-07 NOTE — PROGRESS NOTES
Send results:     A1c level for blood sugar average is stable. Remains mildly elevated. Continue plans as discussed.     Cholesterol is overall stable.    Labs for kidney, liver and electrolytes are stable (i.e.  normal, stable, improved or without clinically significant worsening)

## 2024-06-27 DIAGNOSIS — J30.1 SEASONAL ALLERGIC RHINITIS DUE TO POLLEN: ICD-10-CM

## 2024-06-28 RX ORDER — FLUTICASONE PROPIONATE 50 MCG
SPRAY, SUSPENSION (ML) NASAL
Qty: 16 ML | Refills: 3 | Status: SHIPPED | OUTPATIENT
Start: 2024-06-28

## 2024-07-07 DIAGNOSIS — E11.65 TYPE 2 DIABETES MELLITUS WITH HYPERGLYCEMIA, WITHOUT LONG-TERM CURRENT USE OF INSULIN: Chronic | ICD-10-CM

## 2024-07-08 RX ORDER — DAPAGLIFLOZIN 10 MG/1
1 TABLET, FILM COATED ORAL
Qty: 90 TABLET | Refills: 1 | Status: SHIPPED | OUTPATIENT
Start: 2024-07-08

## 2024-08-07 DIAGNOSIS — E11.65 TYPE 2 DIABETES MELLITUS WITH HYPERGLYCEMIA, WITHOUT LONG-TERM CURRENT USE OF INSULIN: Chronic | ICD-10-CM

## 2024-08-07 DIAGNOSIS — I10 PRIMARY HYPERTENSION: Chronic | ICD-10-CM

## 2024-08-07 DIAGNOSIS — E11.9 TYPE 2 DIABETES MELLITUS WITHOUT COMPLICATION: ICD-10-CM

## 2024-08-08 RX ORDER — HYDROCHLOROTHIAZIDE 25 MG/1
25 TABLET ORAL DAILY
Qty: 90 TABLET | Refills: 1 | Status: SHIPPED | OUTPATIENT
Start: 2024-08-08

## 2024-08-08 RX ORDER — PIOGLITAZONEHYDROCHLORIDE 30 MG/1
30 TABLET ORAL EVERY MORNING
Qty: 90 TABLET | Refills: 1 | Status: SHIPPED | OUTPATIENT
Start: 2024-08-08

## 2024-08-08 RX ORDER — GLIMEPIRIDE 1 MG/1
TABLET ORAL
Qty: 90 TABLET | Refills: 1 | Status: SHIPPED | OUTPATIENT
Start: 2024-08-08

## 2024-09-07 ENCOUNTER — DOCUMENTATION (OUTPATIENT)
Dept: INTERNAL MEDICINE | Age: 79
End: 2024-09-07
Payer: MEDICARE

## 2024-09-07 DIAGNOSIS — J30.1 SEASONAL ALLERGIC RHINITIS DUE TO POLLEN: Primary | ICD-10-CM

## 2024-09-07 RX ORDER — FLUTICASONE PROPIONATE 50 MCG
2 SPRAY, SUSPENSION (ML) NASAL DAILY
Qty: 48 ML | Refills: 3 | Status: SHIPPED | OUTPATIENT
Start: 2024-09-07

## 2024-10-17 ENCOUNTER — TELEPHONE (OUTPATIENT)
Dept: INTERNAL MEDICINE | Age: 79
End: 2024-10-17
Payer: MEDICARE

## 2024-10-17 NOTE — TELEPHONE ENCOUNTER
Caller: CRISSY AYERS    Relationship: Friend    Best call back number: [phone]     What was the call regarding: PATIENTS WIFE   YAKOV BRADSHAW HAS AN APPOINTMENT WITH DR. JONES ON 12/8/24 AT 2:15 PM.  THERE IS A GAP IN DR FALCON SCHEDULED- CAN THE PATIENT SEE DR. JONES AFTER HER APPOINTMENT?  PLEASE CALL CRISSY AYERS IF THIS SCHEDULING CAN OCCUR?

## 2024-10-18 NOTE — TELEPHONE ENCOUNTER
Called the Luxembourgish interpeter [phone] and had her lvm for the pt to call back to schedule with Dr. Godfrey on the same day at 245 pm on dec 09

## 2024-10-21 NOTE — TELEPHONE ENCOUNTER
Called the Khmer , Eva, [phone], the second time and she left message for the pt to callback to schedule with dr. Godfrey in dec with his wife.

## 2024-11-04 DIAGNOSIS — E78.2 MIXED HYPERLIPIDEMIA: Chronic | ICD-10-CM

## 2024-11-04 DIAGNOSIS — E11.9 TYPE 2 DIABETES MELLITUS WITHOUT COMPLICATION: ICD-10-CM

## 2024-11-05 RX ORDER — ATORVASTATIN CALCIUM 20 MG/1
TABLET, FILM COATED ORAL
Qty: 90 TABLET | Refills: 0 | Status: SHIPPED | OUTPATIENT
Start: 2024-11-05

## 2024-11-05 RX ORDER — METFORMIN HYDROCHLORIDE 500 MG/1
1000 TABLET, EXTENDED RELEASE ORAL 2 TIMES DAILY
Qty: 360 TABLET | Refills: 0 | Status: SHIPPED | OUTPATIENT
Start: 2024-11-05

## 2024-11-22 DIAGNOSIS — E11.65 TYPE 2 DIABETES MELLITUS WITH HYPERGLYCEMIA, WITHOUT LONG-TERM CURRENT USE OF INSULIN: Chronic | ICD-10-CM

## 2024-11-22 DIAGNOSIS — I10 PRIMARY HYPERTENSION: Chronic | ICD-10-CM

## 2024-11-23 RX ORDER — OLMESARTAN MEDOXOMIL 40 MG/1
40 TABLET ORAL DAILY
Qty: 90 TABLET | Refills: 0 | Status: SHIPPED | OUTPATIENT
Start: 2024-11-23

## 2024-12-09 DIAGNOSIS — J30.1 SEASONAL ALLERGIC RHINITIS DUE TO POLLEN: ICD-10-CM

## 2024-12-09 DIAGNOSIS — Z12.11 SCREENING FOR COLON CANCER: Primary | ICD-10-CM

## 2024-12-09 RX ORDER — FLUTICASONE PROPIONATE 50 MCG
2 SPRAY, SUSPENSION (ML) NASAL DAILY
Qty: 16 G | Refills: 5 | Status: SHIPPED | OUTPATIENT
Start: 2024-12-09

## 2024-12-09 RX ORDER — CLOTRIMAZOLE 1 %
1 CREAM (GRAM) TOPICAL 2 TIMES DAILY
Qty: 113 G | Refills: 2 | Status: SHIPPED | OUTPATIENT
Start: 2024-12-09

## 2025-01-04 DIAGNOSIS — E11.65 TYPE 2 DIABETES MELLITUS WITH HYPERGLYCEMIA, WITHOUT LONG-TERM CURRENT USE OF INSULIN: Chronic | ICD-10-CM

## 2025-01-09 RX ORDER — DAPAGLIFLOZIN 10 MG/1
1 TABLET, FILM COATED ORAL
Qty: 90 TABLET | Refills: 1 | Status: SHIPPED | OUTPATIENT
Start: 2025-01-09

## 2025-01-31 ENCOUNTER — OFFICE VISIT (OUTPATIENT)
Dept: INTERNAL MEDICINE | Age: 80
End: 2025-01-31
Payer: MEDICARE

## 2025-01-31 VITALS
OXYGEN SATURATION: 93 % | SYSTOLIC BLOOD PRESSURE: 124 MMHG | BODY MASS INDEX: 32.29 KG/M2 | HEART RATE: 68 BPM | WEIGHT: 218 LBS | DIASTOLIC BLOOD PRESSURE: 78 MMHG | HEIGHT: 69 IN

## 2025-01-31 DIAGNOSIS — E11.65 TYPE 2 DIABETES MELLITUS WITH HYPERGLYCEMIA, WITHOUT LONG-TERM CURRENT USE OF INSULIN: Primary | Chronic | ICD-10-CM

## 2025-01-31 DIAGNOSIS — E78.2 MIXED HYPERLIPIDEMIA: Chronic | ICD-10-CM

## 2025-01-31 DIAGNOSIS — I10 PRIMARY HYPERTENSION: Chronic | ICD-10-CM

## 2025-01-31 LAB
ALBUMIN SERPL-MCNC: 4.3 G/DL (ref 3.5–5.2)
ALBUMIN/GLOB SERPL: 1.5 G/DL
ALP SERPL-CCNC: 63 U/L (ref 39–117)
ALT SERPL-CCNC: 15 U/L (ref 1–41)
AST SERPL-CCNC: 22 U/L (ref 1–40)
BILIRUB SERPL-MCNC: 0.4 MG/DL (ref 0–1.2)
BUN SERPL-MCNC: 24 MG/DL (ref 8–23)
BUN/CREAT SERPL: 25.8 (ref 7–25)
CALCIUM SERPL-MCNC: 9.8 MG/DL (ref 8.6–10.5)
CHLORIDE SERPL-SCNC: 102 MMOL/L (ref 98–107)
CO2 SERPL-SCNC: 29.6 MMOL/L (ref 22–29)
CREAT SERPL-MCNC: 0.93 MG/DL (ref 0.76–1.27)
EGFRCR SERPLBLD CKD-EPI 2021: 84.6 ML/MIN/1.73
GLOBULIN SER CALC-MCNC: 2.9 GM/DL
GLUCOSE SERPL-MCNC: 134 MG/DL (ref 65–99)
HBA1C MFR BLD: 6.6 % (ref 4.8–5.6)
POTASSIUM SERPL-SCNC: 4.6 MMOL/L (ref 3.5–5.2)
PROT SERPL-MCNC: 7.2 G/DL (ref 6–8.5)
SODIUM SERPL-SCNC: 142 MMOL/L (ref 136–145)

## 2025-01-31 PROCEDURE — 1160F RVW MEDS BY RX/DR IN RCRD: CPT | Performed by: INTERNAL MEDICINE

## 2025-01-31 PROCEDURE — 3074F SYST BP LT 130 MM HG: CPT | Performed by: INTERNAL MEDICINE

## 2025-01-31 PROCEDURE — 1159F MED LIST DOCD IN RCRD: CPT | Performed by: INTERNAL MEDICINE

## 2025-01-31 PROCEDURE — G2211 COMPLEX E/M VISIT ADD ON: HCPCS | Performed by: INTERNAL MEDICINE

## 2025-01-31 PROCEDURE — 3078F DIAST BP <80 MM HG: CPT | Performed by: INTERNAL MEDICINE

## 2025-01-31 PROCEDURE — 99214 OFFICE O/P EST MOD 30 MIN: CPT | Performed by: INTERNAL MEDICINE

## 2025-01-31 RX ORDER — ORAL SEMAGLUTIDE 7 MG/1
1 TABLET ORAL
Qty: 30 TABLET | Refills: 2 | Status: SHIPPED | OUTPATIENT
Start: 2025-01-31

## 2025-01-31 NOTE — ASSESSMENT & PLAN NOTE
Lab Results   Component Value Date    HGBA1C 7.1 (H) 06/05/2024    HGBA1C 7.20 (H) 02/21/2024    HGBA1C 7.10 (H) 11/27/2023    CREATININE 1.12 06/05/2024    LDL 49 06/05/2024    MALBCRERATIO 20 06/05/2024      Check A1c level.   Take Rybelsus 7 mg qAM (was taking 1/2 BID due to GI side effects)  Discontinue glimepiride 1 mg for now.   Continue metformin  mg 2 twice daily and Farxiga 10 mg daily.  Continue to maintain low carbohydrate diet which she has been doing a better job with recently.

## 2025-01-31 NOTE — ASSESSMENT & PLAN NOTE
BP Readings from Last 3 Encounters:   01/31/25 124/78   06/05/24 120/82   02/28/24 144/88       Continue olmesartan 40 mg and hydrochlorothiazide 25 mg daily.

## 2025-01-31 NOTE — ASSESSMENT & PLAN NOTE
Lab Results   Component Value Date    LDL 49 06/05/2024    LDL 71 06/21/2023    LDL 53 06/16/2022    TRIG 184 (H) 06/05/2024    CHOLHDLRATIO 2.9 06/05/2024       Continue atorvastatin 20 mg

## 2025-01-31 NOTE — PROGRESS NOTES
J  U  N  O  H    K  I  M ,   M  D      I  N  T  E  R  N  A  L    M  E  D  I  C  I  N  E         ENCOUNTER DATE:  01/31/2025    Meño Luxembourgish / 79 y.o. / male    OFFICE VISIT ENCOUNTER       CHIEF COMPLAINT / REASON FOR OFFICE VISIT     Diabetes (Type 2 diabetes mellitus with hyperglycemia, without long-term current use of insulin)      ASSESSMENT & PLAN     Problem List Items Addressed This Visit          High    Type 2 diabetes mellitus with hyperglycemia - Primary (Chronic)    Overview     Continue:  Farxiga 10 mg  Metformin  mg 2 BID  Pioglitazone 30 mg   Rybelsus         Current Assessment & Plan     Lab Results   Component Value Date    HGBA1C 7.1 (H) 06/05/2024    HGBA1C 7.20 (H) 02/21/2024    HGBA1C 7.10 (H) 11/27/2023    CREATININE 1.12 06/05/2024    LDL 49 06/05/2024    MALBCRERATIO 20 06/05/2024      Check A1c level.   Take Rybelsus 7 mg qAM (was taking 1/2 BID due to GI side effects)  Discontinue glimepiride 1 mg for now.   Continue metformin  mg 2 twice daily and Farxiga 10 mg daily.  Continue to maintain low carbohydrate diet which she has been doing a better job with recently.         Relevant Medications    glucose blood test strip    pioglitazone (ACTOS) 30 MG tablet    metFORMIN ER (GLUCOPHAGE-XR) 500 MG 24 hr tablet    olmesartan (BENICAR) 40 MG tablet    dapagliflozin Propanediol 10 MG tablet    Semaglutide (Rybelsus) 7 MG tablet    Other Relevant Orders    Comprehensive Metabolic Panel    Hemoglobin A1c       Medium    Hyperlipidemia (Chronic)    Overview     Continue atorvastatin 20 mg daily.         Current Assessment & Plan     Lab Results   Component Value Date    LDL 49 06/05/2024    LDL 71 06/21/2023    LDL 53 06/16/2022    TRIG 184 (H) 06/05/2024    CHOLHDLRATIO 2.9 06/05/2024       Continue atorvastatin 20 mg         Relevant Medications    atorvastatin (LIPITOR) 20 MG tablet    Hypertension (Chronic)    Current Assessment & Plan     BP Readings from Last 3 Encounters:  "  01/31/25 124/78   06/05/24 120/82   02/28/24 144/88       Continue olmesartan 40 mg and hydrochlorothiazide 25 mg daily.         Relevant Medications    hydroCHLOROthiazide 25 MG tablet    olmesartan (BENICAR) 40 MG tablet     Orders Placed This Encounter   Procedures    Comprehensive Metabolic Panel     Order Specific Question:   Release to patient     Answer:   Routine Release [[id number]]    Hemoglobin A1c     Order Specific Question:   Release to patient     Answer:   Routine Release [[id number]]     New Medications Ordered This Visit   Medications    Semaglutide (Rybelsus) 7 MG tablet     Sig: Take 7 mg by mouth Every Morning Before Breakfast.     Dispense:  30 tablet     Refill:  2       SUMMARY/DISCUSSION  Patient has been erroneously marked as having Ischemic Vascular Disease (IVD). Based on the available clinical information, he does not have IVD and should be excluded from any quality measure requirements related to that condition for the remainder of the reporting period.       TOTAL TIME OF ENCOUNTER:      Next Appointment with me: Visit date not found    Return in 2 months (on 4/3/2025) for Reassess chronic medical problems.      VITAL SIGNS     Vitals:    01/31/25 1453   BP: 124/78   BP Location: Left arm   Patient Position: Sitting   Cuff Size: Adult   Pulse: 68   SpO2: 93%   Weight: 98.9 kg (218 lb)   Height: 175.3 cm (69.02\")       BP Readings from Last 3 Encounters:   01/31/25 124/78   06/05/24 120/82   02/28/24 144/88     Wt Readings from Last 3 Encounters:   01/31/25 98.9 kg (218 lb)   06/05/24 99.8 kg (220 lb)   02/28/24 96.6 kg (213 lb)     Body mass index is 32.17 kg/m².    Blood pressure readings recorded on patient flowsheet:       No data to display                MEDICATIONS AT THE TIME OF OFFICE VISIT     Current Outpatient Medications on File Prior to Visit   Medication Sig    atorvastatin (LIPITOR) 20 MG tablet TAKE 1 TABLET BY MOUTH DAILY BEFORE DINNER    clotrimazole (LOTRIMIN) 1 % " cream Apply 1 Application topically to the appropriate area as directed 2 (Two) Times a Day.    dapagliflozin Propanediol 10 MG tablet TAKE ONE TABLET BY MOUTH EVERY MORNING BEFORE BREAKFAST    fluticasone (FLONASE) 50 MCG/ACT nasal spray 2 sprays by Each Nare route Daily.    glucose blood test strip Check BS once daily    glucose blood test strip Check daily    hydroCHLOROthiazide 25 MG tablet TAKE 1 TABLET BY MOUTH DAILY    [employer] LANCETS THIN 26G misc Use to test blood sugar one time daily. *PT MUST MAKE APPT FOR FUTURE REFILLS*    [employer] Lancets Thin 26G misc USE ONE LANCET TO TEST DAILY    metFORMIN ER (GLUCOPHAGE-XR) 500 MG 24 hr tablet TAKE 2 TABLETS BY MOUTH TWICE A DAY    olmesartan (BENICAR) 40 MG tablet TAKE 1 TABLET BY MOUTH DAILY    pioglitazone (ACTOS) 30 MG tablet TAKE 1 TABLET BY MOUTH EVERY MORNING    WAVESENSE PRESTO test strip USE ONE STRIP TO TEST DAILY    [DISCONTINUED] glimepiride (AMARYL) 1 MG tablet TAKE ONE TABLET BY MOUTH EVERY MORNING BEFORE BREAKFAST    [DISCONTINUED] Semaglutide (Rybelsus) 7 MG tablet Take 3.5 mg by mouth 2 (Two) Times a Day.    [DISCONTINUED] timolol (TIMOPTIC) 0.5 % ophthalmic solution  (Patient not taking: Reported on 1/31/2025)     No current facility-administered medications on file prior to visit.         HISTORY OF PRESENT ILLNESS     Patient states that he has been watching his carbohydrate intake much more closely.  He takes Rybelsus 7 mg 1/2 tablet in the morning and 1/2 tablet in the afternoon/evening due to GI side effects.  He would like to retry taking a full tablet in the morning going forward.  He takes glimepiride about 3 to 4 days a week when he is eating more carbohydrate rich foods.  He avoids taking it when he exercises or plays golfing due to hypoglycemia symptoms.  He also takes metformin  mg 2 tablets twice daily and Farxiga 10 mg.  Most recent A1c was 7.1 in June.  Hypertension remains well-controlled on olmesartan 40 mg and  hydrochlorothiazide 25 mg.  Denies side effects from taking atorvastatin 20 mg and his LDL remains controlled.    Lab Results   Component Value Date    HGBA1C 7.1 (H) 06/05/2024    HGBA1C 7.20 (H) 02/21/2024    HGBA1C 7.10 (H) 11/27/2023    CREATININE 1.12 06/05/2024    LDL 49 06/05/2024    MALBCRERATIO 20 06/05/2024     Blood sugar readings recorded on patient's flowsheet:       No data to display               98.9 kg (218 lb)    Patient Care Team:  Siva Godfrey MD as PCP - General  [facility] (Ophthalmology)    REVIEW OF SYSTEMS           PHYSICAL EXAMINATION     Physical Exam  Alert with normal thought and judgment.   Cardiovascular: Normal rate, regular rhythm.   Pulm/Chest: Effort normal, breath sounds normal.       REVIEWED DATA     Labs:       Lab Results   Component Value Date     06/05/2024    K 4.3 06/05/2024    CALCIUM 9.6 06/05/2024    AST 23 06/05/2024    ALT 10 06/05/2024    BUN 22 06/05/2024    CREATININE 1.12 06/05/2024    CREATININE 0.73 (L) 11/27/2023    CREATININE 1.02 02/09/2023    EGFRRESULT 68 06/05/2024     Lab Results   Component Value Date    HGBA1C 7.1 (H) 06/05/2024    HGBA1C 7.20 (H) 02/21/2024    HGBA1C 7.10 (H) 11/27/2023     Lab Results   Component Value Date    LDL 49 06/05/2024    LDL 71 06/21/2023    LDL 53 06/16/2022    HDL 41 06/05/2024    HDL 49 06/21/2023    TRIG 184 (H) 06/05/2024    TRIG 134 06/21/2023     Lab Results   Component Value Date    TSH 0.859 03/22/2019    TSH 1.140 03/27/2018    FREET4 1.65 03/22/2019    FREET4 1.58 03/27/2018     Lab Results   Component Value Date    WBC 4.7 06/16/2022    HGB 14.1 06/16/2022     06/16/2022     Lab Results   Component Value Date    MALBCRERATIO 20 06/05/2024         Imaging:           Medical Tests:           Summary of old records / correspondence / consultant report:           Request outside records:

## 2025-02-03 NOTE — PROGRESS NOTES
MAIL RESULTS:     A1c level for blood sugar average is improved.     Labs for kidney, liver and electrolytes are stable (i.e.  normal, stable, improved or without clinically significant worsening).     Maintain good hydration with water at all times.

## 2025-02-05 ENCOUNTER — DOCUMENTATION (OUTPATIENT)
Dept: INTERNAL MEDICINE | Age: 80
End: 2025-02-05
Payer: MEDICARE

## 2025-02-05 DIAGNOSIS — E11.65 TYPE 2 DIABETES MELLITUS WITH HYPERGLYCEMIA, WITHOUT LONG-TERM CURRENT USE OF INSULIN: Primary | Chronic | ICD-10-CM

## 2025-02-05 RX ORDER — ORAL SEMAGLUTIDE 7 MG/1
1 TABLET ORAL
Qty: 30 TABLET | Refills: 2 | Status: SHIPPED | OUTPATIENT
Start: 2025-02-05

## 2025-02-06 DIAGNOSIS — E11.65 TYPE 2 DIABETES MELLITUS WITH HYPERGLYCEMIA, WITHOUT LONG-TERM CURRENT USE OF INSULIN: Chronic | ICD-10-CM

## 2025-02-06 DIAGNOSIS — I10 PRIMARY HYPERTENSION: Chronic | ICD-10-CM

## 2025-02-06 DIAGNOSIS — E11.9 TYPE 2 DIABETES MELLITUS WITHOUT COMPLICATION: ICD-10-CM

## 2025-02-06 RX ORDER — PIOGLITAZONE 30 MG/1
30 TABLET ORAL EVERY MORNING
Qty: 90 TABLET | Refills: 1 | Status: SHIPPED | OUTPATIENT
Start: 2025-02-06

## 2025-02-06 RX ORDER — HYDROCHLOROTHIAZIDE 25 MG/1
25 TABLET ORAL DAILY
Qty: 90 TABLET | Refills: 1 | Status: SHIPPED | OUTPATIENT
Start: 2025-02-06

## 2025-02-06 RX ORDER — GLIMEPIRIDE 1 MG/1
TABLET ORAL
Qty: 90 TABLET | Refills: 1 | OUTPATIENT
Start: 2025-02-06

## 2025-02-19 ENCOUNTER — DOCUMENTATION (OUTPATIENT)
Dept: INTERNAL MEDICINE | Age: 80
End: 2025-02-19
Payer: MEDICARE

## 2025-02-19 DIAGNOSIS — E11.9 TYPE 2 DIABETES MELLITUS WITHOUT COMPLICATION, WITHOUT LONG-TERM CURRENT USE OF INSULIN: Primary | ICD-10-CM

## 2025-02-19 RX ORDER — METFORMIN HYDROCHLORIDE 500 MG/1
1000 TABLET, EXTENDED RELEASE ORAL 2 TIMES DAILY
Qty: 360 TABLET | Refills: 1 | Status: SHIPPED | OUTPATIENT
Start: 2025-02-19

## 2025-02-26 ENCOUNTER — DOCUMENTATION (OUTPATIENT)
Dept: INTERNAL MEDICINE | Age: 80
End: 2025-02-26
Payer: MEDICARE

## 2025-02-26 DIAGNOSIS — I10 PRIMARY HYPERTENSION: Chronic | ICD-10-CM

## 2025-02-26 DIAGNOSIS — E11.65 TYPE 2 DIABETES MELLITUS WITH HYPERGLYCEMIA, WITHOUT LONG-TERM CURRENT USE OF INSULIN: Primary | Chronic | ICD-10-CM

## 2025-02-26 RX ORDER — OLMESARTAN MEDOXOMIL 40 MG/1
40 TABLET ORAL DAILY
Qty: 90 TABLET | Refills: 1 | Status: SHIPPED | OUTPATIENT
Start: 2025-02-26

## 2025-04-04 ENCOUNTER — DOCUMENTATION (OUTPATIENT)
Dept: INTERNAL MEDICINE | Age: 80
End: 2025-04-04
Payer: MEDICARE

## 2025-04-04 DIAGNOSIS — E78.2 MIXED HYPERLIPIDEMIA: Primary | Chronic | ICD-10-CM

## 2025-04-04 RX ORDER — ATORVASTATIN CALCIUM 20 MG/1
20 TABLET, FILM COATED ORAL NIGHTLY
Qty: 90 TABLET | Refills: 3 | Status: SHIPPED | OUTPATIENT
Start: 2025-04-04

## 2025-04-09 ENCOUNTER — OFFICE VISIT (OUTPATIENT)
Dept: INTERNAL MEDICINE | Age: 80
End: 2025-04-09
Payer: MEDICARE

## 2025-04-09 ENCOUNTER — HOSPITAL ENCOUNTER (OUTPATIENT)
Facility: HOSPITAL | Age: 80
Discharge: HOME | End: 2025-04-09
Admitting: INTERNAL MEDICINE
Payer: MEDICARE

## 2025-04-09 VITALS
OXYGEN SATURATION: 96 % | HEART RATE: 66 BPM | WEIGHT: 221 LBS | SYSTOLIC BLOOD PRESSURE: 160 MMHG | BODY MASS INDEX: 32.73 KG/M2 | TEMPERATURE: 97.1 F | DIASTOLIC BLOOD PRESSURE: 80 MMHG | HEIGHT: 69 IN

## 2025-04-09 DIAGNOSIS — E78.2 MIXED HYPERLIPIDEMIA: Chronic | ICD-10-CM

## 2025-04-09 DIAGNOSIS — I10 PRIMARY HYPERTENSION: Chronic | ICD-10-CM

## 2025-04-09 DIAGNOSIS — E11.65 TYPE 2 DIABETES MELLITUS WITH HYPERGLYCEMIA, WITHOUT LONG-TERM CURRENT USE OF INSULIN: Chronic | ICD-10-CM

## 2025-04-09 DIAGNOSIS — M54.16 LUMBAR BACK PAIN WITH RADICULOPATHY AFFECTING LEFT LOWER EXTREMITY: Primary | ICD-10-CM

## 2025-04-09 PROCEDURE — 72110 X-RAY EXAM L-2 SPINE 4/>VWS: CPT

## 2025-04-09 RX ORDER — METHYLPREDNISOLONE 4 MG/1
TABLET ORAL
Qty: 1 EACH | Refills: 0 | Status: SHIPPED | OUTPATIENT
Start: 2025-04-09

## 2025-04-09 NOTE — ASSESSMENT & PLAN NOTE
2 months ago injured his back stomping on frozen ice with left low back pain with radiculitis pain.  Was treated by a physician at his [facility] (? Steroids) with short term improvement but now with ongoing pain.   Check lumbar xray initially but will likely require an MRI     Will treatment with Medrol Ez for now  Tylenol PRN for pain   Modify physical activity     2 weeks follow-up   
Medrol Ez for left sciatic pain, monitor glucose/diet closely   Same medications for now     Lab Results   Component Value Date    HGBA1C 6.60 (H) 01/31/2025    HGBA1C 7.1 (H) 06/05/2024    HGBA1C 7.20 (H) 02/21/2024    CREATININE 0.93 01/31/2025    LDL 49 06/05/2024    MALBCRERATIO 20 06/05/2024      
Statement Selected

## 2025-04-09 NOTE — PROGRESS NOTES
J  U  N  O  H    K  I  M ,   M  D      I  N  T  E  R  N  A  L    M  E  D  I  C  I  N  E         ENCOUNTER DATE:  04/09/2025    Meño Amharic / 80 y.o. / male    OFFICE VISIT ENCOUNTER       CHIEF COMPLAINT / REASON FOR OFFICE VISIT     Diabetes, Hyperlipidemia, Hypertension, and Left sciatic pain      ASSESSMENT & PLAN     Problem List Items Addressed This Visit          High    Lumbar back pain with radiculopathy affecting left lower extremity - Primary (Chronic)    Current Assessment & Plan   2 months ago injured his back stomping on frozen ice with left low back pain with radiculitis pain.  Was treated by a physician at his [facility] (? Steroids) with short term improvement but now with ongoing pain.   Check lumbar xray initially but will likely require an MRI     Will treatment with Medrol Ez for now  Tylenol PRN for pain   Modify physical activity     2 weeks follow-up          Relevant Medications    methylPREDNISolone (Medrol) 4 MG dose pack    Other Relevant Orders    XR Spine Lumbar Complete 4+VW       Medium    Type 2 diabetes mellitus with hyperglycemia (Chronic)    Overview   Continue:  Farxiga 10 mg  Metformin  mg 2 BID  Pioglitazone 30 mg   Rybelsus 7 mg 1/2 BID          Current Assessment & Plan   Medrol Ez for left sciatic pain, monitor glucose/diet closely   Same medications for now     Lab Results   Component Value Date    HGBA1C 6.60 (H) 01/31/2025    HGBA1C 7.1 (H) 06/05/2024    HGBA1C 7.20 (H) 02/21/2024    CREATININE 0.93 01/31/2025    LDL 49 06/05/2024    MALBCRERATIO 20 06/05/2024             Relevant Medications    glucose blood test strip    dapagliflozin Propanediol 10 MG tablet    Semaglutide (Rybelsus) 7 MG tablet    pioglitazone (ACTOS) 30 MG tablet    metFORMIN ER (GLUCOPHAGE-XR) 500 MG 24 hr tablet    olmesartan (BENICAR) 40 MG tablet    Hyperlipidemia (Chronic)    Overview   Continue atorvastatin 20 mg daily.         Relevant Medications    atorvastatin (LIPITOR) 20 MG tablet  "   Hypertension (Chronic)    Relevant Medications    hydroCHLOROthiazide 25 MG tablet    olmesartan (BENICAR) 40 MG tablet     Orders Placed This Encounter   Procedures    XR Spine Lumbar Complete 4+VW     Reason for Exam::   left lower back pain with radiculopathy     Release to patient:   Routine Release [[id number]]     New Medications Ordered This Visit   Medications    methylPREDNISolone (Medrol) 4 MG dose pack     Sig: Take as directed on package instructions.     Dispense:  1 each     Refill:  0       SUMMARY/DISCUSSION        TOTAL TIME OF ENCOUNTER:      Next Appointment with me: 4/23/2025    Return in about 2 weeks (around 4/23/2025) for Reassess today's problem(s).      VITAL SIGNS     Vitals:    04/09/25 1434   BP: 160/80   Pulse: 66   Temp: 97.1 °F (36.2 °C)   SpO2: 96%   Weight: 100 kg (221 lb)   Height: 175.3 cm (69.02\")       BP Readings from Last 3 Encounters:   04/09/25 160/80   01/31/25 124/78   06/05/24 120/82     Wt Readings from Last 3 Encounters:   04/09/25 100 kg (221 lb)   01/31/25 98.9 kg (218 lb)   06/05/24 99.8 kg (220 lb)     Body mass index is 32.62 kg/m².    Blood pressure readings recorded on patient flowsheet:       No data to display                MEDICATIONS AT THE TIME OF OFFICE VISIT     Current Outpatient Medications on File Prior to Visit   Medication Sig    atorvastatin (LIPITOR) 20 MG tablet Take 1 tablet by mouth Every Night.    clotrimazole (LOTRIMIN) 1 % cream Apply 1 Application topically to the appropriate area as directed 2 (Two) Times a Day.    dapagliflozin Propanediol 10 MG tablet TAKE ONE TABLET BY MOUTH EVERY MORNING BEFORE BREAKFAST    fluticasone (FLONASE) 50 MCG/ACT nasal spray 2 sprays by Each Nare route Daily.    glucose blood test strip Check BS once daily    glucose blood test strip Check daily    hydroCHLOROthiazide 25 MG tablet TAKE 1 TABLET BY MOUTH DAILY    [employer] LANCETS THIN 26G misc Use to test blood sugar one time daily. *PT MUST MAKE APPT FOR " FUTURE REFILLS*    [employer] Lancets Thin 26G misc USE ONE LANCET TO TEST DAILY    metFORMIN ER (GLUCOPHAGE-XR) 500 MG 24 hr tablet Take 2 tablets by mouth 2 (Two) Times a Day.    olmesartan (BENICAR) 40 MG tablet Take 1 tablet by mouth Daily.    pioglitazone (ACTOS) 30 MG tablet TAKE 1 TABLET BY MOUTH EVERY MORNING    Semaglutide (Rybelsus) 7 MG tablet Take 7 mg by mouth Every Morning Before Breakfast.    City[employer] test strip USE ONE STRIP TO TEST DAILY     No current facility-administered medications on file prior to visit.         HISTORY OF PRESENT ILLNESS     He injured his left lower back while stomping on frozen ice about 2 months ago.  A physician at his [facility] put him on some medication (?  Steroid pack) with short-term relief of symptoms but then the pain continued/recurred.  At this time he complains of severe left lower extremity pain that radiates down to the ankle region.  He complains of subjective weakness sensation of the left leg without any bowel or bladder dysfunction.  He is taking another medication that was prescribed by the same provider but does not know the name of the drug.  Most recent A1c for diabetes was 6.6.  He is on multidrug regimen including Rybelsus 7 mg 1/2 tablet twice daily, Farxiga, metformin and pioglitazone.  He noticed that his blood sugar was generally 20 points higher and started taking glimepiride that was discontinued previously.  At this time he is out of this medication.  Blood pressure generally remains stable but it is elevated currently.  He may be taking some type of an NSAID that was prescribed by a outside provider.  He was instructed to discontinue that medication.  He is on atorvastatin 20 mg for hyperlipidemia.    Lab Results   Component Value Date    HGBA1C 6.60 (H) 01/31/2025    HGBA1C 7.1 (H) 06/05/2024    HGBA1C 7.20 (H) 02/21/2024    CREATININE 0.93 01/31/2025    LDL 49 06/05/2024    MALBCRERATIO 20 06/05/2024     Blood sugar readings recorded on  patient's flowsheet:       No data to display               100 kg (221 lb)    Patient Care Team:  Siva Godfrey MD as PCP - General  [facility] (Ophthalmology)    REVIEW OF SYSTEMS           PHYSICAL EXAMINATION     Physical Exam  Alert with normal thought and judgment.   Appears uncomfortable due to pain   Cardiovascular: Normal rate, regular rhythm.  Pulm/Chest: Effort normal, breath sounds normal.  Back: stooped forward on standing, no significant tenderness to palpation over the lumbar region   Bilateral lower extremities strength 5/5 (slightly weaker on left)  SLR is negative   DTR 2/2 bilateral lower extremities   Gait stopped with mild limp       REVIEWED DATA     Labs:       Lab Results   Component Value Date     01/31/2025    K 4.6 01/31/2025    CALCIUM 9.8 01/31/2025    AST 22 01/31/2025    ALT 15 01/31/2025    BUN 24 (H) 01/31/2025    CREATININE 0.93 01/31/2025    CREATININE 1.12 06/05/2024    CREATININE 0.73 (L) 11/27/2023    EGFR 84.6 01/31/2025     Lab Results   Component Value Date    HGBA1C 6.60 (H) 01/31/2025    HGBA1C 7.1 (H) 06/05/2024    HGBA1C 7.20 (H) 02/21/2024     Lab Results   Component Value Date    MALBCRERATIO 20 06/05/2024     Lab Results   Component Value Date    LDL 49 06/05/2024    LDL 71 06/21/2023    LDL 53 06/16/2022    HDL 41 06/05/2024    HDL 49 06/21/2023    TRIG 184 (H) 06/05/2024    TRIG 134 06/21/2023     Lab Results   Component Value Date    TSH 0.859 03/22/2019    TSH 1.140 03/27/2018    FREET4 1.65 03/22/2019    FREET4 1.58 03/27/2018     Lab Results   Component Value Date    WBC 4.7 06/16/2022    HGB 14.1 06/16/2022     06/16/2022         Imaging:     XR Spine Lumbar 4+ View (06/04/2018 15:04)   FINDINGS: There is bridging enthesophyte anteriorly at the T11-12,  T12-L1, and L3-4 levels. Vertebral body height and alignment are normal  throughout the lumbar spine. Disc height is fairly well preserved. There  is facet  arthropathy at L4-5 and L5-S1. The SI joints appear normal. No  bone lesion is identified.     IMPRESSION:  Degenerative change in the lumbar and lower thoracic spine.       Medical Tests:           Summary of old records / correspondence / consultant report:           Request outside records:            Never

## 2025-04-16 ENCOUNTER — DOCUMENTATION (OUTPATIENT)
Dept: INTERNAL MEDICINE | Age: 80
End: 2025-04-16
Payer: MEDICARE

## 2025-04-16 DIAGNOSIS — E78.2 MIXED HYPERLIPIDEMIA: Primary | Chronic | ICD-10-CM

## 2025-04-16 RX ORDER — ATORVASTATIN CALCIUM 20 MG/1
20 TABLET, FILM COATED ORAL NIGHTLY
Qty: 90 TABLET | Refills: 3 | Status: SHIPPED | OUTPATIENT
Start: 2025-04-16

## 2025-04-23 ENCOUNTER — OFFICE VISIT (OUTPATIENT)
Dept: INTERNAL MEDICINE | Age: 80
End: 2025-04-23
Payer: MEDICARE

## 2025-04-23 VITALS
WEIGHT: 215 LBS | HEART RATE: 83 BPM | DIASTOLIC BLOOD PRESSURE: 82 MMHG | TEMPERATURE: 97.1 F | BODY MASS INDEX: 31.84 KG/M2 | OXYGEN SATURATION: 95 % | SYSTOLIC BLOOD PRESSURE: 132 MMHG | HEIGHT: 69 IN

## 2025-04-23 DIAGNOSIS — M54.16 LUMBAR BACK PAIN WITH RADICULOPATHY AFFECTING LEFT LOWER EXTREMITY: Primary | Chronic | ICD-10-CM

## 2025-04-23 DIAGNOSIS — E11.65 TYPE 2 DIABETES MELLITUS WITH HYPERGLYCEMIA, WITHOUT LONG-TERM CURRENT USE OF INSULIN: Chronic | ICD-10-CM

## 2025-04-23 NOTE — ASSESSMENT & PLAN NOTE
Improving with Medrol Ez but has ongoing pain of left leg.   Previously ordered MRI of lumbar spine but has not been scheduled.   Will have  look into this today.

## 2025-04-23 NOTE — PROGRESS NOTES
"                                            J  U  N  O  H    K  I  M ,   M  D                               I  N  T  E  R  N  A  L    M  E  D  I  C  I  N  E         ENCOUNTER DATE:  04/23/2025    Meño Sami / 80 y.o. / male    OFFICE VISIT ENCOUNTER       CHIEF COMPLAINT / REASON FOR OFFICE VISIT     Lumbar back pain with radiculopathy affecting left lower ex, Type 2 diabetes mellitus with hyperglycemia, Hyperlipidemia, and Hypertension      ASSESSMENT & PLAN     Problem List Items Addressed This Visit          High    Lumbar back pain with radiculopathy affecting left lower extremity - Primary (Chronic)    Current Assessment & Plan   Improving with Medrol Ez but has ongoing pain of left leg.   Previously ordered MRI of lumbar spine but has not been scheduled.   Will have  look into this today.             Medium    Type 2 diabetes mellitus with hyperglycemia (Chronic)    Overview   Continue:  Farxiga 10 mg  Metformin  mg 2 BID  Pioglitazone 30 mg   Rybelsus 7 mg 1/2 BID          Current Assessment & Plan   Glucose is higher since taking Medrol Ez but is coming back down.   Watch diet and monitor glucose closely.   Same medications for now.          Relevant Medications    glucose blood test strip    dapagliflozin Propanediol 10 MG tablet    Semaglutide (Rybelsus) 7 MG tablet    pioglitazone (ACTOS) 30 MG tablet    metFORMIN ER (GLUCOPHAGE-XR) 500 MG 24 hr tablet    olmesartan (BENICAR) 40 MG tablet     No orders of the defined types were placed in this encounter.    No orders of the defined types were placed in this encounter.      SUMMARY/DISCUSSION        TOTAL TIME OF ENCOUNTER:        Next Appointment with me: Visit date not found     Return in about 3 months (around 7/23/2025) for Reassess chronic medical problems.      VITAL SIGNS     Vitals:    04/23/25 1407   BP: 132/82   Pulse: 83   Temp: 97.1 °F (36.2 °C)   SpO2: 95%   Weight: 97.5 kg (215 lb)   Height: 175.3 cm (69.02\")       BP Readings " from Last 3 Encounters:   04/23/25 132/82   04/09/25 160/80   01/31/25 124/78     Wt Readings from Last 3 Encounters:   04/23/25 97.5 kg (215 lb)   04/09/25 100 kg (221 lb)   01/31/25 98.9 kg (218 lb)     Body mass index is 31.73 kg/m².    Blood pressure readings recorded on patient flowsheet:       No data to display                  MEDICATIONS AT THE TIME OF OFFICE VISIT     Current Outpatient Medications on File Prior to Visit   Medication Sig    atorvastatin (LIPITOR) 20 MG tablet Take 1 tablet by mouth Every Night.    clotrimazole (LOTRIMIN) 1 % cream Apply 1 Application topically to the appropriate area as directed 2 (Two) Times a Day.    dapagliflozin Propanediol 10 MG tablet TAKE ONE TABLET BY MOUTH EVERY MORNING BEFORE BREAKFAST    glucose blood test strip Check BS once daily    glucose blood test strip Check daily    hydroCHLOROthiazide 25 MG tablet TAKE 1 TABLET BY MOUTH DAILY    [employer] LANCETS THIN 26G misc Use to test blood sugar one time daily. *PT MUST MAKE APPT FOR FUTURE REFILLS*    [employer] Lancets Thin 26G misc USE ONE LANCET TO TEST DAILY    metFORMIN ER (GLUCOPHAGE-XR) 500 MG 24 hr tablet Take 2 tablets by mouth 2 (Two) Times a Day.    olmesartan (BENICAR) 40 MG tablet Take 1 tablet by mouth Daily.    pioglitazone (ACTOS) 30 MG tablet TAKE 1 TABLET BY MOUTH EVERY MORNING    Semaglutide (Rybelsus) 7 MG tablet Take 7 mg by mouth Every Morning Before Breakfast.    WAVESENSE PRESTO test strip USE ONE STRIP TO TEST DAILY    [DISCONTINUED] methylPREDNISolone (Medrol) 4 MG dose pack Take as directed on package instructions.    fluticasone (FLONASE) 50 MCG/ACT nasal spray 2 sprays by Each Nare route Daily. (Patient not taking: Reported on 4/23/2025)     No current facility-administered medications on file prior to visit.          HISTORY OF PRESENT ILLNESS     UTILIZED [place]      After round of Medrol Dosepak he reports significant improvement in his left lower extremity pain although he  continues to experience discomfort.  He has complaints of mild weakness symptom.  Denies any bowel or bladder problem.   X-ray of the lumbar spine shows retrolisthesis of L2 on 3, L3 on L4.  No acute fracture was identified.  There was multilevel degenerative disc changes and arthritis.  Previously ordered MRI of the lumbar spine for further evaluation but he has not been contacted for scheduling.  He is interested in proceeding with the MRI study.    Patient Care Team:  Siva Godfrey MD as PCP - General  [facility] ([facility]    REVIEW OF SYSTEMS     Review of Systems       PHYSICAL EXAMINATION     Physical Exam  Alert with normal thought and judgment.   Appears in less pain  Gait is improved       REVIEWED DATA     Labs:     Lab Results   Component Value Date     01/31/2025    K 4.6 01/31/2025    CALCIUM 9.8 01/31/2025    AST 22 01/31/2025    ALT 15 01/31/2025    BUN 24 (H) 01/31/2025    CREATININE 0.93 01/31/2025    CREATININE 1.12 06/05/2024    CREATININE 0.73 (L) 11/27/2023    EGFR 84.6 01/31/2025     Lab Results   Component Value Date    HGBA1C 6.60 (H) 01/31/2025    HGBA1C 7.1 (H) 06/05/2024    HGBA1C 7.20 (H) 02/21/2024     Lab Results   Component Value Date    MALBCRERATIO 20 06/05/2024     Lab Results   Component Value Date    LDL 49 06/05/2024    LDL 71 06/21/2023    LDL 53 06/16/2022    HDL 41 06/05/2024    HDL 49 06/21/2023    TRIG 184 (H) 06/05/2024    TRIG 134 06/21/2023     Lab Results   Component Value Date    TSH 0.859 03/22/2019    TSH 1.140 03/27/2018    FREET4 1.65 03/22/2019    FREET4 1.58 03/27/2018     Lab Results   Component Value Date    WBC 4.7 06/16/2022    HGB 14.1 06/16/2022     06/16/2022           Imaging:     XR Spine Lumbar Complete 4+VW (04/09/2025 15:38)           Medical Tests:               Summary of old records / correspondence / consultant report:             Request outside records:

## 2025-04-23 NOTE — ASSESSMENT & PLAN NOTE
Glucose is higher since taking Medrol Ez but is coming back down.   Watch diet and monitor glucose closely.   Same medications for now.

## 2025-05-04 DIAGNOSIS — E11.65 TYPE 2 DIABETES MELLITUS WITH HYPERGLYCEMIA, WITHOUT LONG-TERM CURRENT USE OF INSULIN: Chronic | ICD-10-CM

## 2025-05-06 RX ORDER — ORAL SEMAGLUTIDE 7 MG/1
1 TABLET ORAL
Qty: 90 TABLET | Refills: 1 | Status: SHIPPED | OUTPATIENT
Start: 2025-05-06

## 2025-05-06 NOTE — PROGRESS NOTES
Call patient's son with his test result(s) and mail the results to him if MyChart is NOT active.    1. Diabetes is better. Continue current plans.   2. Kidney function has somewhat declined. Avoid any OTC NSAIDS. Maintain good hydration. RECHECK BMP IN 1 MONTH (DX: acute renal insufficiency)  3. Mild anemia is noted. Recheck in 1 month along with iron studies. (CBC, iron, ferritin). Be sure to do the Cologuard test which has been ordered. Yes

## 2025-06-05 ENCOUNTER — HOSPITAL ENCOUNTER (OUTPATIENT)
Dept: MRI IMAGING | Facility: HOSPITAL | Age: 80
Discharge: HOME | End: 2025-06-05
Admitting: INTERNAL MEDICINE
Payer: MEDICARE

## 2025-06-05 DIAGNOSIS — M54.16 LUMBAR BACK PAIN WITH RADICULOPATHY AFFECTING LEFT LOWER EXTREMITY: ICD-10-CM

## 2025-06-05 PROCEDURE — 72148 MRI LUMBAR SPINE W/O DYE: CPT

## 2025-07-03 DIAGNOSIS — E11.65 TYPE 2 DIABETES MELLITUS WITH HYPERGLYCEMIA, WITHOUT LONG-TERM CURRENT USE OF INSULIN: Chronic | ICD-10-CM

## 2025-07-03 RX ORDER — DAPAGLIFLOZIN 10 MG/1
1 TABLET, FILM COATED ORAL
Qty: 90 TABLET | Refills: 1 | Status: SHIPPED | OUTPATIENT
Start: 2025-07-03

## 2025-07-28 ENCOUNTER — TELEPHONE (OUTPATIENT)
Dept: INTERNAL MEDICINE | Age: 80
End: 2025-07-28

## 2025-07-28 NOTE — TELEPHONE ENCOUNTER
Attempted to leave voicemail for patient to reschedule 7/28/25 appointment, but patient did not answer and mailbox was full.

## 2025-07-28 NOTE — TELEPHONE ENCOUNTER
Patient came in for appointment on 7/28/25. Explained to patient we attempted to reach them two times, but were unable to make contact and there was no voicemail.    Offered patient sooner appointments with Miko Goldberg. Patient declined.    Explained to patient that Dr. Godfrey is booked out through November. Patient chose to take an appointment in November with Dr. Godfrey and asked for a specific time of 2:30.

## 2025-08-05 DIAGNOSIS — I10 PRIMARY HYPERTENSION: Chronic | ICD-10-CM

## 2025-08-05 DIAGNOSIS — E11.9 TYPE 2 DIABETES MELLITUS WITHOUT COMPLICATION: ICD-10-CM

## 2025-08-06 RX ORDER — HYDROCHLOROTHIAZIDE 25 MG/1
25 TABLET ORAL DAILY
Qty: 90 TABLET | Refills: 1 | Status: SHIPPED | OUTPATIENT
Start: 2025-08-06

## 2025-08-06 RX ORDER — PIOGLITAZONE 30 MG/1
30 TABLET ORAL EVERY MORNING
Qty: 90 TABLET | Refills: 1 | Status: SHIPPED | OUTPATIENT
Start: 2025-08-06

## 2025-08-17 DIAGNOSIS — E11.9 TYPE 2 DIABETES MELLITUS WITHOUT COMPLICATION, WITHOUT LONG-TERM CURRENT USE OF INSULIN: ICD-10-CM

## 2025-08-19 RX ORDER — METFORMIN HYDROCHLORIDE 500 MG/1
1000 TABLET, EXTENDED RELEASE ORAL 2 TIMES DAILY
Qty: 360 TABLET | Refills: 1 | Status: SHIPPED | OUTPATIENT
Start: 2025-08-19

## 2025-08-25 DIAGNOSIS — I10 PRIMARY HYPERTENSION: Chronic | ICD-10-CM

## 2025-08-25 DIAGNOSIS — E11.65 TYPE 2 DIABETES MELLITUS WITH HYPERGLYCEMIA, WITHOUT LONG-TERM CURRENT USE OF INSULIN: Chronic | ICD-10-CM

## 2025-08-25 RX ORDER — OLMESARTAN MEDOXOMIL 40 MG/1
40 TABLET ORAL DAILY
Qty: 90 TABLET | Refills: 1 | Status: SHIPPED | OUTPATIENT
Start: 2025-08-25